# Patient Record
Sex: MALE | NOT HISPANIC OR LATINO | URBAN - METROPOLITAN AREA
[De-identification: names, ages, dates, MRNs, and addresses within clinical notes are randomized per-mention and may not be internally consistent; named-entity substitution may affect disease eponyms.]

---

## 2019-02-05 ENCOUNTER — INPATIENT HOSPITAL (OUTPATIENT)
Dept: URBAN - METROPOLITAN AREA HOSPITAL 108 | Facility: HOSPITAL | Age: 54
End: 2019-02-05
Payer: COMMERCIAL

## 2019-02-05 DIAGNOSIS — F19.10 OTHER PSYCHOACTIVE SUBSTANCE ABUSE, UNCOMPLICATED: ICD-10-CM

## 2019-02-05 DIAGNOSIS — R74.8 ABNORMAL LEVELS OF OTHER SERUM ENZYMES: ICD-10-CM

## 2019-02-05 DIAGNOSIS — F10.10 ALCOHOL ABUSE, UNCOMPLICATED: ICD-10-CM

## 2019-02-05 DIAGNOSIS — R10.84 GENERALIZED ABDOMINAL PAIN: ICD-10-CM

## 2019-02-05 PROCEDURE — 99222 1ST HOSP IP/OBS MODERATE 55: CPT | Performed by: PHYSICIAN ASSISTANT

## 2020-07-01 ENCOUNTER — TREATMENT (OUTPATIENT)
Dept: PHYSICAL THERAPY | Facility: CLINIC | Age: 55
End: 2020-07-01

## 2020-07-01 ENCOUNTER — TRANSCRIBE ORDERS (OUTPATIENT)
Dept: OCCUPATIONAL THERAPY | Facility: CLINIC | Age: 55
End: 2020-07-01

## 2020-07-01 DIAGNOSIS — S39.012S STRAIN OF LUMBAR REGION, SEQUELA: Primary | ICD-10-CM

## 2020-07-01 DIAGNOSIS — M62.830 LUMBAR PARASPINAL MUSCLE SPASM: ICD-10-CM

## 2020-07-01 PROCEDURE — 97530 THERAPEUTIC ACTIVITIES: CPT | Performed by: PHYSICAL THERAPIST

## 2020-07-01 PROCEDURE — 97014 ELECTRIC STIMULATION THERAPY: CPT | Performed by: PHYSICAL THERAPIST

## 2020-07-01 PROCEDURE — 97162 PT EVAL MOD COMPLEX 30 MIN: CPT | Performed by: PHYSICAL THERAPIST

## 2020-07-01 NOTE — PROGRESS NOTES
Orthopedic / Sports / Industrial Physical Therapy  Physical Therapy Initial Evaluation and Plan of Care    Patient Name: Barrett Arriaza          :  1965  Referring Physician: Dileep Godfrey PA  Diagnosis: Strain of lumbar region, sequela [S39.012S]    Date of Evaluation: 2020  ______________________________________________________________________    Subjective Evaluation    History of Present Illness  Date of onset: 2020  Mechanism of injury: Lifting / twisting Drywall -     Pain  Location: Central Lumbar spine -   Quality: Sharp/stabbing, ache.  Alleviating factors: Side-lying  (R) ; Change position; Heat;   Exacerbated by: Sitting>standing, Rising, bending,   Progression: worsening    Diagnostic Tests  Abnormal x-ray: Results unknown this date.    Treatments  Current treatment: medication  Patient Goals  Patient/family treatment goals: Pain alleviation; morbility to allow normal ADL;s and regular job w/o restrictions.         ___________________________________________________  Objective          Postural Observations    Additional Postural Observation Details  Pt presents with FF'ed posture and guarded - Very painful Transitional movements and guarded / antalgic gait - Pt frequently changing position due to pain -    Palpation     Additional Palpation Details  Severely painful / hypersensitive L4-S1 and (B) PS region -     Active Range of Motion     Additional Active Range of Motion Details  Lumbar AROM: Minimal AROM all planes w/ severe pain -     Strength/Myotome Testing     Additional Strength Details  LE myotomes grossly intact, but unable to fully assess due to severe pain w/ movement / positioning, etc.     Tests     Additional Tests Details  Significant pain / guarding w/ attempted SLR - unsure if (+) vs pain / guarding     General Comments     Lumbar Comments  Very difficult to fully assess due to severe pain / hypersensitivity to even light touch -   Pt unable to tolerate even  the most conservative treatment including US and even gentle mobility exercises -        See Treatment Flow sheet for Exercises, Manual therapy, and modalities.   FUNCTIONAL ACTIVITIES: X 10 min  · TAPING / BRACING: K-Tape to Unload (B) LPS  · Jt protection, ADL modification; Posture and     ___________________________________________________  Assessment & Plan     Assessment  Assessment details: Lumbar strain;   Severe pain - difficult to fully assess and poor tolerance to even most conservative modalities and unable to tolerate mobility exercises, etc.   Pt did tolerate Taping of LPS well -     PROBLEMS: Severe pain; Limited ROM / mobility; Abnormal gait; Postural dysfunction; Inability to tolerate ADL's and normal job duties -   PROGNOSIS:  Fair -     GOALS:   SHORT TERM GOALS: 2 weeks:  1) HEP Initiated; 2) Pain decreased 50%:   3) AROM  increased:  4) Improved functional ability grossly;     LONG TERM GOALS: 4 weeks (or at time of DISCHARGE): 1) (I) HEP; 2) AROM WFL and pain free; 3) Strength / mobility to be able to perform all ADL's and job-related activities w/o restrictions;       Plan  Planned therapy interventions: abdominal trunk stabilization, body mechanics training, flexibility, home exercise program, manual therapy, neuromuscular re-education, postural training, soft tissue mobilization, spinal/joint mobilization, strengthening, stretching and therapeutic activities (Modalities prn; Taping / bracing prn; )  Other planned therapy interventions: ADD GENTLE  LTR, SKC, etc as pain allows -   Frequency: 3x week  Duration in weeks: 4  Treatment plan discussed with: patient      ___________________________________________________  Manual Therapy:         mins  67925;   Therapeutic Exercise:         mins  53434;     Neuromuscular Chuckie:        mins  95785;   Therapeutic Activity:     10     mins  79055;     Ultrasound:          mins  97855;    Electrical Stimulation:   20     mins  24171 (ADRIAN  );  Dry Needling          mins self-pay   Gait Training:          mins  31095;  EVAL TIME:   20 min    Timed Treatment:   10   mins                Total Treatment:     55   mins    PT SIGNATURE:   Alfredo Christensen, PT  DATE TREATMENT INITIATED: 7/1/2020  ___________________________________________________  Initial Certification  Certification Period: 9/29/2020  I certify that the therapy services are furnished while this patient is under my care.  The services outlined above are required by this patient, and will be reviewed every 90 days.     PHYSICIAN: ________________________________  DATE: ______  Dileep Godfrey PA        Please sign and return via fax to [phone].. Thank you, [facility].  ______________________________________________________________________  [address] [place] [address]  Phone: [phone] Fax: [phone]

## 2020-07-06 ENCOUNTER — TREATMENT (OUTPATIENT)
Dept: PHYSICAL THERAPY | Facility: CLINIC | Age: 55
End: 2020-07-06

## 2020-07-06 DIAGNOSIS — M62.830 LUMBAR PARASPINAL MUSCLE SPASM: ICD-10-CM

## 2020-07-06 DIAGNOSIS — S39.012S STRAIN OF LUMBAR REGION, SEQUELA: Primary | ICD-10-CM

## 2020-07-06 PROCEDURE — 97140 MANUAL THERAPY 1/> REGIONS: CPT | Performed by: PHYSICAL THERAPIST

## 2020-07-06 PROCEDURE — 97035 APP MDLTY 1+ULTRASOUND EA 15: CPT | Performed by: PHYSICAL THERAPIST

## 2020-07-06 PROCEDURE — 97110 THERAPEUTIC EXERCISES: CPT | Performed by: PHYSICAL THERAPIST

## 2020-07-06 PROCEDURE — 97014 ELECTRIC STIMULATION THERAPY: CPT | Performed by: PHYSICAL THERAPIST

## 2020-07-06 NOTE — PROGRESS NOTES
Physical Therapy Daily Progress Note        VISIT#: 2      Barrett Arriaza reports: Pt reports he can turn more now and in that regards he is doing better. He returned to work today on light duty and it was not good. He had to push and pull items and he could just not doing it. He also had to drive to [place] for his job and all the siting was painful. He took his kinesiotape off last night. He can't go up steps.   Current Pain Level:    8/10; Worst:   9/10; Bestb:  0/10 (on his side in fetal position)  Location Of Pain: From approx T12 to sacrum,   Response to Previous Session: Reported the last session helped a lot - he can move more now  Functional Deficits: Bending, Lifting, Twisting, stairs, pushing and pulling  Progression: good  Compliance with HEP Reported: No. Reported he has none    Objective   Presents: Decreased maria l with gait. Mild forward flexed posture  Increased sets/reps of:  none   Increased resistance on:  none  Added to Program: LTR, pelvic tilts, SKTC, Quadratus stretch    Gave Written HEP:  Access Code: [email or web address]   URL: [email or web address]   Date: 07/06/2020   Prepared by: Ashlie Theodore     Exercises   Supine Posterior Pelvic Tilt - 10 reps - 3 sets - 1x daily - 5x weekly   Supine Lower Trunk Rotation - 10 reps - 3 sets - 1x daily - 5x weekly   Hooklying Clamshell with Resistance - 10 reps - 3 sets - 1x daily - 5x weekly           See Exercise, Manual, and Modality Logs for complete treatment.     Patient Education: Pt was educated on exercise biomechanical correctness, intensity, and speed. Pt  Given some simple pain science education. Educated on importance of mobility, changing positions often, limiting ROM to mostly pain free ranges.     Assessment:  Pt continues to remain guarded with slow and cautious movement but improved since last session. He was able to tolerate tactile cues and STM this date - also ultrasound. He was jumpy at times. Pt also gives  subjective report of improved mobility since last session. Pt will continue to benefit from skilled PT to address current functional deficits and impairments.       Plan: Progress to/Continue with current program. Hooklying marching and adduction w/ball. Teach diaphragmatic breathing and TA draw.         Manual Therapy:    10     mins  90289;  Therapeutic Exercise:    35     mins  49691;     Neuromuscular Chuckie:    0    mins  96581;    Therapeutic Activity:     0     mins  11262;     Electrical Stimulation: __15____ mins 36540  Ultrasound:   10 mins 51531  Timed Treatment:   70   mins   Total Treatment:     75   mins    Ashlie Theodore PTA  [place] License # [id number]  Physical Therapist Assistant

## 2020-07-07 ENCOUNTER — TREATMENT (OUTPATIENT)
Dept: PHYSICAL THERAPY | Facility: CLINIC | Age: 55
End: 2020-07-07

## 2020-07-07 DIAGNOSIS — S39.012S STRAIN OF LUMBAR REGION, SEQUELA: Primary | ICD-10-CM

## 2020-07-07 DIAGNOSIS — M62.830 LUMBAR PARASPINAL MUSCLE SPASM: ICD-10-CM

## 2020-07-07 PROCEDURE — 97140 MANUAL THERAPY 1/> REGIONS: CPT | Performed by: PHYSICAL THERAPIST

## 2020-07-07 PROCEDURE — 97014 ELECTRIC STIMULATION THERAPY: CPT | Performed by: PHYSICAL THERAPIST

## 2020-07-07 PROCEDURE — 97110 THERAPEUTIC EXERCISES: CPT | Performed by: PHYSICAL THERAPIST

## 2020-07-07 PROCEDURE — 97530 THERAPEUTIC ACTIVITIES: CPT | Performed by: PHYSICAL THERAPIST

## 2020-07-07 NOTE — PROGRESS NOTES
------------------------------------------------------------------------------------------------------   MD PROGRESS NOTE    Patient: Barrett Arriaza        : 1965  Diagnosis/ICD-10 Code:  Strain of lumbar region, sequela [S39.012S]  Referring practitioner: Dileep Godfrey PA  Date of Initial Visit: 2020                  Today's Date: 2020  _________________________________________________________________    Thank you for the referral of Mr. Arriaza to [facility].  Mr. Arriaza has attended 3 PT sessions and their treatment has consisted of: modalities prn, manual therapy, therapeutic exercise, kinesio taping, patient education, and HEP.     Subjective   Barrett Arriaza reports: feeling a little bit better since starting PT - Able to move a little better, but it still hurts -   Pain (B) LSS spine feels like pressure thru spine when standing - Feels best fetal position R/L;  Pain increased with prolonged standing, walking < sitting; Rising;  Bending; Driving; sweeping -   ___________________________________________________________________  Objective              OBSERVATION: Guarded posture, flexed posture w/ guarded antalgic gait - Level pelvis;  MM LPS;             Swelling L4-S1 central;  Painful transitional movements -               PALPATION: Severely tenderness (B) L4-S1 and associated PS         AROM: Significantly limited AROM lumbar spine into flexion, extension, SB (B)   STRENGTH: LE Myotomes grossly intact   DTR's/SENSATION: Grossly intact (B) LE's -    SPECIAL TESTS: Pain with SLR (B), but may be due to guarding vs actual Positive Test;    ACTIVITY TOLERANCE: Improved tolerance to light ADL's, but unable to tolerate mod/heavy ADL's and his very physical job -      See Exercise, Manual, and Modality Logs for complete treatment.      Functional / Therapeutic Activities:  X 15 min  · TAPING / BRACING: K-tape to inhibit LPS (B) for pain control (Pt did  not return after OV for taping)  · FUNCTIONAL ASSESSMENT   · Jt protection, ADL modification; Posture and    ___________________________________________________________________   Assessment/Plan  Lumbar strain;   Improved overall, but still signficant pain and employer NOT complying w/ restrictions -   Only minimal improvement - Continued severe pain and limited mobility and function -  Mr. Arriaza would benefit from continued Physical Therapy.     P: Recommend continued Physical Therapy to allow a full and safe return to ADL's and normal job duties.    Please advise after your exam.    Thank you again for this referral of Mr. Arriaza to [facility].    PT Signature: ______________________________   Alfredo Christensen, PT  ____________________________________________________________________  Manual Therapy:    10     mins  06762;    Therapeutic Exercise:   10     mins  23965;     Neuromuscular Chuckie:        mins  01882;     Therapeutic Activity:    15     mins  42704;     Ultrasound:     08     mins  32542;     Electrical Stimulation:  15     mins  32522 ( );  Dry Needling          mins self-pay     Gait Training:          mins  03272;    Timed Treatment:   48   mins             Total Treatment:     70   mins    ______________________________________________________________________  [address] [place] [address]  Phone: [phone] Fax: [phone]

## 2020-07-13 ENCOUNTER — TREATMENT (OUTPATIENT)
Dept: PHYSICAL THERAPY | Facility: CLINIC | Age: 55
End: 2020-07-13

## 2020-07-13 DIAGNOSIS — S39.012S STRAIN OF LUMBAR REGION, SEQUELA: Primary | ICD-10-CM

## 2020-07-13 DIAGNOSIS — M62.830 LUMBAR PARASPINAL MUSCLE SPASM: ICD-10-CM

## 2020-07-13 PROCEDURE — 97112 NEUROMUSCULAR REEDUCATION: CPT | Performed by: PHYSICAL THERAPIST

## 2020-07-13 PROCEDURE — 97110 THERAPEUTIC EXERCISES: CPT | Performed by: PHYSICAL THERAPIST

## 2020-07-13 PROCEDURE — 97140 MANUAL THERAPY 1/> REGIONS: CPT | Performed by: PHYSICAL THERAPIST

## 2020-07-13 PROCEDURE — 97014 ELECTRIC STIMULATION THERAPY: CPT | Performed by: PHYSICAL THERAPIST

## 2020-07-13 PROCEDURE — 97035 APP MDLTY 1+ULTRASOUND EA 15: CPT | Performed by: PHYSICAL THERAPIST

## 2020-07-13 NOTE — PROGRESS NOTES
Physical Therapy Daily Progress Note        VISIT#: 4      Barrett Arriaza reports: Pt reports he is still feeling a lot of pressure in his low back - especially with prolonged walking and sitting. He does report moving better and is able to lie on his back now not just fetal position.   Current Pain Level:    0/10; Worst:   8/10; Best:  0/10 stated pain has started to feel burning.  Location Of Pain: low back  Response to Previous Session: Progressing  Functional Deficits: prolonged walking and sitting. Pushing and pulling  Progression: progressing   Compliance with HEP Reported: Yes    Objective   Presents: Normal gait pattern.   Increased sets/reps of:  Pelvic Tilts   Increased resistance on:  none  Added to Program: BKFOs, Seated L/S mobilization   Home Exercises Given: BKFOs      See Exercise, Manual, and Modality Logs for complete treatment.     Patient Education: Pt was educated on exercise biomechanical correctness, intensity, and speed.     Assessment:  Mild edema noted in L side of lumbar spine as compared to the R side. Tender to palpitation/moderate pressure and jumpy if pressure gets too heavy.  Hip mobility on L is more restricted than on his R side. Applied Kinesiotape to L/S for pain and edema mobilization. Will assess response next session. Pt will continue to benefit from skilled PT to address current functional deficits and impairments.       Plan: Progress to/Continue with current program. Return to evaluating therapist. Assess affect of kinesiotape on L/S for swelling and pain. Assess response to today's session.         Manual Therapy:    12     mins  59258;  Therapeutic Exercise:    25    mins  13695;     Neuromuscular [place]:    8    mins  43331;    Therapeutic Activity:     0     mins  38154;     Electrical Stimulation: __15____ mins 12823  Ultrasound:   10 mins 94128  Timed Treatment:   65   mins   Total Treatment:     70   mins    CELINA Samaniego License # [id number]  Physical  Therapist Assistant

## 2020-07-14 ENCOUNTER — TREATMENT (OUTPATIENT)
Dept: PHYSICAL THERAPY | Facility: CLINIC | Age: 55
End: 2020-07-14

## 2020-07-14 DIAGNOSIS — M62.830 LUMBAR PARASPINAL MUSCLE SPASM: ICD-10-CM

## 2020-07-14 DIAGNOSIS — S39.012S STRAIN OF LUMBAR REGION, SEQUELA: Primary | ICD-10-CM

## 2020-07-14 PROCEDURE — 97530 THERAPEUTIC ACTIVITIES: CPT | Performed by: PHYSICAL THERAPIST

## 2020-07-14 PROCEDURE — 97140 MANUAL THERAPY 1/> REGIONS: CPT | Performed by: PHYSICAL THERAPIST

## 2020-07-14 PROCEDURE — 97110 THERAPEUTIC EXERCISES: CPT | Performed by: PHYSICAL THERAPIST

## 2020-07-14 NOTE — PROGRESS NOTES
------------------------------------------------------------------------------------------------------   MD PROGRESS NOTE     Patient: Barrett Arriaza        : 1965  Diagnosis/ICD-10 Code:  Strain of lumbar region, sequela [S39.012S]  Referring practitioner: Dileep Godfrey PA  Date of Initial Visit: 2020                  Today's Date: 2020  _________________________________________________________________     Thank you for the referral of Mr. Arriaza to [facility].  Mr. Arriaza has attended 5 PT sessions and their treatment has consisted of: modalities prn, manual therapy, therapeutic exercise, kinesio taping, patient education, and HEP.      Subjective   Barrett Arriaza reports: improvement with improved ability to move around - pain with lifting items of wt - prolonged standing and prolonged sitting, pain with bending,  - Pain more central and to the left -   Pain up to about 7-8/10 at times  No longer able to side-ly due to pain - now more comfortable on back   ___________________________________________________________________  Objective              OBSERVATION: Guarded posture, flexed posture w/ guarded antalgic gait - (L) [place] / ASIS / PSIS higher vs (R);             Swelling L4-S1 central;  Painful transitional movements -               PALPATION: Severely tenderness (B) L4-S1 and associated PS  : Tender (B) SI Region (R)              AROM: Significantly limited AROM lumbar spine into flexion (Slightly improved) , extension (minimal w/ severe pain) , SB (B) 1/2 norm with increased LBP                STRENGTH: LE Myotomes grossly intact              DTR's/SENSATION: Grossly intact (B) LE's -               SPECIAL TESTS: Pain with SLR (B), but may be due to guarding vs actual Positive Test;                                            (+) SI Jt Dysfunction / Upshear (L) >(R)              ACTIVITY TOLERANCE: Improved tolerance to light ADL's, but unable to  tolerate mod/heavy ADL's including, standing, sitting, lifting, pushing / pulling and his very physical job -               See Exercise, Manual, and Modality Logs for complete treatment.      Functional / Therapeutic Activities:  X 25 min  · TAPING / BRACING: K-tape to inhibit LPS (B) for pain control  2 strips distal/prox with 2 unloading strips SI region and L4-5 ;  K-Tape to Inhibit QL - (B)   · FUNCTIONAL ASSESSMENT   · Jt protection, ADL modification; Posture and    ___________________________________________________________________   Assessment/Plan  Lumbar strain;   Improved overall, but still signficant pain and employer NOT complying w/ restrictions -   Only minimal improvement - Continued severe pain and limited mobility and function -  Improved mobility after more aggressive MT, but pain persistent   Mr. Arriaza would benefit from continued Physical Therapy vs further assessment .      P: Recommend continued Physical Therapy to allow a full and safe return to ADL's and normal job duties.    Please advise after your exam.     Thank you again for this referral of Mr. Arriaza to [facility].     PT Signature: ______________________________   Alfredo Christensen, PT  ____________________________________________________________________  Manual Therapy:            15     mins  83796;    Therapeutic Exercise:   15     mins  48391;     Neuromuscular Chuckie:        mins  27476;     Therapeutic Activity:    25     mins  81647;     Ultrasound:                     08     mins  97144;     Electrical Stimulation:  15     mins  11020 ( );  Dry Needling                       mins self-pay     Gait Training:            mins  76394;     Timed Treatment:   63   mins             Total Treatment:     80   mins     ______________________________________________________________________  [address] [address] [place] [address]  Phone: [phone]                 Fax: [phone]

## 2020-07-15 ENCOUNTER — TELEPHONE (OUTPATIENT)
Dept: PHYSICAL THERAPY | Facility: CLINIC | Age: 55
End: 2020-07-15

## 2020-07-15 NOTE — TELEPHONE ENCOUNTER
MR DICKERSON CALLED STATING HIS BACK IS HURTING HIM REAL BAD AND HE WANTS TO KNOW WHAT HE CAN DO.  HE WOULD LIKE SOMEONE TO CALL HIM.

## 2020-11-25 ENCOUNTER — DOCUMENTATION (OUTPATIENT)
Dept: PHYSICAL THERAPY | Facility: CLINIC | Age: 55
End: 2020-11-25

## 2020-11-25 NOTE — PROGRESS NOTES
Discharge Summary  Discharge Summary from Physical Therapy Report    Patient Information  Barrett Arriaza  1965    Dates  PT visit: 07/01/20 to 07/14/20  Number of Visits: 6       Discharge Status of Patient: See PT Note dated 07/14/20    Goals: Not Met    Visit Diagnoses:  No diagnosis found.    Discharge Plan: Pt did not return to PT after follow up with referring provider    Comments: none    Date of Discharge 11/25/20        Ashlie Theodore, CELINA  Physical Therapist

## 2022-02-15 ENCOUNTER — HOSPITAL ENCOUNTER (EMERGENCY)
Facility: HOSPITAL | Age: 57
Discharge: HOME | End: 2022-02-15
Attending: EMERGENCY MEDICINE | Admitting: EMERGENCY MEDICINE

## 2022-02-15 ENCOUNTER — APPOINTMENT (OUTPATIENT)
Dept: CT IMAGING | Facility: HOSPITAL | Age: 57
End: 2022-02-15

## 2022-02-15 VITALS
SYSTOLIC BLOOD PRESSURE: 118 MMHG | TEMPERATURE: 98.2 F | DIASTOLIC BLOOD PRESSURE: 77 MMHG | HEART RATE: 76 BPM | OXYGEN SATURATION: 98 % | RESPIRATION RATE: 18 BRPM

## 2022-02-15 DIAGNOSIS — K62.5 RECTAL BLEEDING: Primary | ICD-10-CM

## 2022-02-15 LAB
ABO GROUP BLD: NORMAL
ALBUMIN SERPL-MCNC: 4.2 G/DL (ref 3.5–5.2)
ALBUMIN/GLOB SERPL: 1.6 G/DL
ALP SERPL-CCNC: 79 U/L (ref 39–117)
ALT SERPL W P-5'-P-CCNC: 28 U/L (ref 1–41)
ANION GAP SERPL CALCULATED.3IONS-SCNC: 7.9 MMOL/L (ref 5–15)
AST SERPL-CCNC: 24 U/L (ref 1–40)
BASOPHILS # BLD AUTO: 0.04 10*3/MM3 (ref 0–0.2)
BASOPHILS NFR BLD AUTO: 0.9 % (ref 0–1.5)
BILIRUB SERPL-MCNC: 0.6 MG/DL (ref 0–1.2)
BILIRUB UR QL STRIP: NEGATIVE
BLD GP AB SCN SERPL QL: NEGATIVE
BUN SERPL-MCNC: 8 MG/DL (ref 6–20)
BUN/CREAT SERPL: 10.1 (ref 7–25)
CALCIUM SPEC-SCNC: 9.2 MG/DL (ref 8.6–10.5)
CHLORIDE SERPL-SCNC: 101 MMOL/L (ref 98–107)
CLARITY UR: CLEAR
CO2 SERPL-SCNC: 30.1 MMOL/L (ref 22–29)
COLOR UR: YELLOW
CREAT SERPL-MCNC: 0.79 MG/DL (ref 0.76–1.27)
DEPRECATED RDW RBC AUTO: 42.8 FL (ref 37–54)
EOSINOPHIL # BLD AUTO: 0.3 10*3/MM3 (ref 0–0.4)
EOSINOPHIL NFR BLD AUTO: 6.8 % (ref 0.3–6.2)
ERYTHROCYTE [DISTWIDTH] IN BLOOD BY AUTOMATED COUNT: 12.8 % (ref 12.3–15.4)
GFR SERPL CREATININE-BSD FRML MDRD: 124 ML/MIN/1.73
GLOBULIN UR ELPH-MCNC: 2.6 GM/DL
GLUCOSE SERPL-MCNC: 86 MG/DL (ref 65–99)
GLUCOSE UR STRIP-MCNC: NEGATIVE MG/DL
HCT VFR BLD AUTO: 45.6 % (ref 37.5–51)
HGB BLD-MCNC: 16.3 G/DL (ref 13–17.7)
HGB UR QL STRIP.AUTO: NEGATIVE
IMM GRANULOCYTES # BLD AUTO: 0.01 10*3/MM3 (ref 0–0.05)
IMM GRANULOCYTES NFR BLD AUTO: 0.2 % (ref 0–0.5)
KETONES UR QL STRIP: NEGATIVE
LEUKOCYTE ESTERASE UR QL STRIP.AUTO: NEGATIVE
LYMPHOCYTES # BLD AUTO: 1.83 10*3/MM3 (ref 0.7–3.1)
LYMPHOCYTES NFR BLD AUTO: 41.2 % (ref 19.6–45.3)
MAGNESIUM SERPL-MCNC: 2.1 MG/DL (ref 1.6–2.6)
MCH RBC QN AUTO: 32.9 PG (ref 26.6–33)
MCHC RBC AUTO-ENTMCNC: 35.7 G/DL (ref 31.5–35.7)
MCV RBC AUTO: 92.1 FL (ref 79–97)
MONOCYTES # BLD AUTO: 0.76 10*3/MM3 (ref 0.1–0.9)
MONOCYTES NFR BLD AUTO: 17.1 % (ref 5–12)
NEUTROPHILS NFR BLD AUTO: 1.5 10*3/MM3 (ref 1.7–7)
NEUTROPHILS NFR BLD AUTO: 33.8 % (ref 42.7–76)
NITRITE UR QL STRIP: NEGATIVE
NRBC BLD AUTO-RTO: 0 /100 WBC (ref 0–0.2)
PH UR STRIP.AUTO: 7.5 [PH] (ref 5–8)
PLATELET # BLD AUTO: 251 10*3/MM3 (ref 140–450)
PMV BLD AUTO: 8.8 FL (ref 6–12)
POTASSIUM SERPL-SCNC: 3.6 MMOL/L (ref 3.5–5.2)
PROT SERPL-MCNC: 6.8 G/DL (ref 6–8.5)
PROT UR QL STRIP: NEGATIVE
RBC # BLD AUTO: 4.95 10*6/MM3 (ref 4.14–5.8)
RH BLD: POSITIVE
SODIUM SERPL-SCNC: 139 MMOL/L (ref 136–145)
SP GR UR STRIP: >=1.03 (ref 1–1.03)
T&S EXPIRATION DATE: NORMAL
UROBILINOGEN UR QL STRIP: NORMAL
WBC NRBC COR # BLD: 4.44 10*3/MM3 (ref 3.4–10.8)

## 2022-02-15 PROCEDURE — 99283 EMERGENCY DEPT VISIT LOW MDM: CPT

## 2022-02-15 PROCEDURE — 96374 THER/PROPH/DIAG INJ IV PUSH: CPT

## 2022-02-15 PROCEDURE — 80053 COMPREHEN METABOLIC PANEL: CPT | Performed by: EMERGENCY MEDICINE

## 2022-02-15 PROCEDURE — 25010000002 DIPHENHYDRAMINE PER 50 MG: Performed by: EMERGENCY MEDICINE

## 2022-02-15 PROCEDURE — 86900 BLOOD TYPING SEROLOGIC ABO: CPT | Performed by: EMERGENCY MEDICINE

## 2022-02-15 PROCEDURE — 74177 CT ABD & PELVIS W/CONTRAST: CPT

## 2022-02-15 PROCEDURE — 85025 COMPLETE CBC W/AUTO DIFF WBC: CPT | Performed by: EMERGENCY MEDICINE

## 2022-02-15 PROCEDURE — 25010000002 KETOROLAC TROMETHAMINE PER 15 MG: Performed by: EMERGENCY MEDICINE

## 2022-02-15 PROCEDURE — 86850 RBC ANTIBODY SCREEN: CPT | Performed by: EMERGENCY MEDICINE

## 2022-02-15 PROCEDURE — 81003 URINALYSIS AUTO W/O SCOPE: CPT | Performed by: EMERGENCY MEDICINE

## 2022-02-15 PROCEDURE — 96375 TX/PRO/DX INJ NEW DRUG ADDON: CPT

## 2022-02-15 PROCEDURE — 86901 BLOOD TYPING SEROLOGIC RH(D): CPT | Performed by: EMERGENCY MEDICINE

## 2022-02-15 PROCEDURE — 83735 ASSAY OF MAGNESIUM: CPT | Performed by: EMERGENCY MEDICINE

## 2022-02-15 PROCEDURE — 25010000002 IOPAMIDOL 61 % SOLUTION: Performed by: EMERGENCY MEDICINE

## 2022-02-15 RX ORDER — KETOROLAC TROMETHAMINE 15 MG/ML
15 INJECTION, SOLUTION INTRAMUSCULAR; INTRAVENOUS ONCE
Status: COMPLETED | OUTPATIENT
Start: 2022-02-15 | End: 2022-02-15

## 2022-02-15 RX ORDER — DIPHENHYDRAMINE HYDROCHLORIDE 50 MG/ML
12.5 INJECTION INTRAMUSCULAR; INTRAVENOUS ONCE
Status: COMPLETED | OUTPATIENT
Start: 2022-02-15 | End: 2022-02-15

## 2022-02-15 RX ADMIN — IOPAMIDOL 85 ML: 612 INJECTION, SOLUTION INTRAVENOUS at 15:19

## 2022-02-15 RX ADMIN — KETOROLAC TROMETHAMINE 15 MG: 15 INJECTION, SOLUTION INTRAMUSCULAR; INTRAVENOUS at 16:07

## 2022-02-15 RX ADMIN — DIPHENHYDRAMINE HYDROCHLORIDE 12.5 MG: 50 INJECTION, SOLUTION INTRAMUSCULAR; INTRAVENOUS at 15:10

## 2022-02-15 NOTE — ED PROVIDER NOTES
EMERGENCY DEPARTMENT ENCOUNTER    Room Number:  38/38  Date of encounter:  2/16/2022  PCP: Mahi Omer APRN  Historian: Patient      HPI:  Chief Complaint: Abdominal pain, rectal bleeding  A complete HPI/ROS/PMH/PSH/SH/FH are unobtainable due to: None    Context: Barrett Arriaza is a 56 y.o. male who presents to the ED via private vehicle for evaluation for several weeks of intermittent lower abdominal pains with bright red blood per rectum with bowel movements.  Patient reports history of hemorrhoids but does not feel like he has any active hemorrhoids that he can tell.  Is also reported some postprandial fatigue and weakness and sweating.  Reports some night sweats and mild 5 to 6 pound unintentional weight loss.  Reports no fevers, chills, cough, chest pain, short of breath.  Has had nausea without vomiting.      MEDICAL RECORD REVIEW    Chart review in epic shows that the patient was in the ER at [facility] earlier today and left without being seen, did have some blood drawn, which has resulted with CMP of sodium 135, potassium 4.1, anion gap of 6, glucose of 85, BUN of 7, creatinine of 0.7, unremarkable LFTs, CBC showing white blood cell count of 5.07, hemoglobin 16.9, platelets of 291    PAST MEDICAL HISTORY  Active Ambulatory Problems     Diagnosis Date Noted   • No Active Ambulatory Problems     Resolved Ambulatory Problems     Diagnosis Date Noted   • No Resolved Ambulatory Problems     No Additional Past Medical History         PAST SURGICAL HISTORY  No past surgical history on file.      FAMILY HISTORY  No family history on file.      SOCIAL HISTORY  Social History     Socioeconomic History   • Marital status:    Tobacco Use   • Smoking status: Heavy Tobacco Smoker     Packs/day: 1.00     Types: Cigarettes   Substance and Sexual Activity   • Alcohol use: Yes     Alcohol/week: 3.0 standard drinks     Types: 3 Cans of beer per week   • Drug use: Yes     Frequency: 3.0 times per week     Types:  Marijuana         ALLERGIES  Iodinated diagnostic agents, Oxycodone-acetaminophen, Morphine, and Penicillins        REVIEW OF SYSTEMS  Review of Systems     All systems reviewed and negative except for those discussed in HPI.       PHYSICAL EXAM    I have reviewed the triage vital signs and nursing notes.    ED Triage Vitals [02/15/22 1343]   Temp Heart Rate Resp BP SpO2   98.2 °F (36.8 °C) 102 18 -- 94 %      Temp src Heart Rate Source Patient Position BP Location FiO2 (%)   Tympanic Monitor -- -- --       Physical Exam  General: No acute distress, nontoxic  HEENT: Mucous membranes moist, atraumatic, EOMI  Neck: Full ROM  Pulm: Symmetric chest rise, nonlabored, lungs CTAB  Cardiovascular: Regular rate and rhythm, intact distal pulses  GI: Soft, minimal generalized lower abdominal tenderness palpation, nondistended, no rebound, no guarding, bowel sounds present  Rectal: No visible external hemorrhoids or anal fissures  MSK: Full ROM, no deformity  Skin: Warm, dry  Neuro: Awake, alert, oriented x 4, GCS 15, moving all extremities, no focal deficits  Psych: Calm, cooperative      N95, protective eye goggles, and gloves used during this encounter. Patient in surgical mask.      LAB RESULTS  No results found for this or any previous visit (from the past 24 hour(s)).    Ordered the above labs and independently reviewed the results.        RADIOLOGY  No Radiology Exams Resulted Within Past 24 Hours    I ordered the above noted radiological studies. Reviewed by me.  See dictation for official radiology interpretation.      PROCEDURES    Procedures      MEDICATIONS GIVEN IN ER    Medications   diphenhydrAMINE (BENADRYL) injection 12.5 mg (12.5 mg Intravenous Given 2/15/22 1510)   iopamidol (ISOVUE-300) 61 % injection 100 mL (85 mL Intravenous Given by Other 2/15/22 1519)   ketorolac (TORADOL) injection 15 mg (15 mg Intravenous Given 2/15/22 1607)         PROGRESS, DATA ANALYSIS, CONSULTS, AND MEDICAL DECISION MAKING    All  labs have been independently reviewed by me.  All radiology studies have been reviewed by me and discussed with radiologist dictating the report.   EKG's independently viewed and interpreted by me.  Discussion below represents my analysis of pertinent findings related to patient's condition, differential diagnosis, treatment plan and final disposition.    Initial concern for colitis, diverticulitis, internal hemorrhoid, bowel obstruction, anemia, renal failure, electrolyte normalities, among others.    ED Course as of 02/16/22 2032   Tue Feb 15, 2022   1459 WBC: 4.44 [DC]   1459 Hemoglobin: 16.3 [DC]   1518 Glucose: 86 [DC]   1518 BUN: 8 [DC]   1518 Creatinine: 0.79 [DC]   1518 Sodium: 139 [DC]   1518 Potassium: 3.6 [DC]   1519 ALT (SGPT): 28 [DC]   1519 AST (SGOT): 24 [DC]   1519 Alkaline Phosphatase: 79 [DC]   1519 Total Bilirubin: 0.6 [DC]      ED Course User Index  [DC] Fabiano Lynch MD     CT report showing enlarged and possibly boggy prostate, patient denies any urinary symptoms but some of the more systemic type symptoms he is experiencing could be related to prostatitis.  Will check a urinalysis and if evidence of urinary tract infection we will put him on extended course of Bactrim.  If negative, recommend GI follow-up and PCP follow-up.  No evidence of any acute emergent issues otherwise.    AS OF 20:32 EST VITALS:    BP - 118/77  HR - 76  TEMP - 98.2 °F (36.8 °C) (Tympanic)  02 SATS - 98%        DIAGNOSIS  Final diagnoses:   Rectal bleeding         DISPOSITION  DISCHARGE    Patient discharged in stable condition.    Reviewed implications of results, diagnosis, meds, responsibility to follow up, warning signs and symptoms of possible worsening, potential complications and reasons to return to ER.    Patient/Family voiced understanding of above instructions.    Discussed plan for discharge, as there is no emergent indication for admission. Patient referred to primary care provider for BP management due to  today's BP. Pt/family is agreeable and understands need for follow up and repeat testing.  Pt is aware that discharge does not mean that nothing is wrong but it indicates no emergency is present that requires admission and they must continue care with follow-up as given below or physician of their choice.     FOLLOW-UP  [facility] Emergency Department  [address] [address] [phone]    As needed, If symptoms worsen    Mahi Omer, REJI  [address] [address]  [phone]    Schedule an appointment as soon as possible for a visit   As needed    [facility]  [address]  [facility] [address] [phone]  Schedule an appointment as soon as possible for a visit            Medication List      No changes were made to your prescriptions during this visit.                    Fabiano Lynch MD  02/16/22 2032

## 2022-02-15 NOTE — ED TRIAGE NOTES
"Pt states that for the last few weeks he has had bright red blood in his stool. Also complains of weakness. States \"Any time I eat I start sweating a lot.\" Pt complains of lower abdominal pain as well. Went to [facility] this morning and [facility]BS. Patient masked at arrival and triage staff wore all appropriate PPE during entire encounter with patient.     "

## 2022-02-15 NOTE — DISCHARGE INSTRUCTIONS
Follow-up with gastroenterology as discussed for colonoscopy in the near future, stay well-hydrated with plenty of water, close PCP follow-up, ED return for worsening symptoms as needed.

## 2022-02-24 ENCOUNTER — TELEPHONE (OUTPATIENT)
Dept: GASTROENTEROLOGY | Facility: CLINIC | Age: 57
End: 2022-02-24

## 2022-02-24 ENCOUNTER — OFFICE VISIT (OUTPATIENT)
Dept: GASTROENTEROLOGY | Facility: CLINIC | Age: 57
End: 2022-02-24

## 2022-02-24 VITALS — HEIGHT: 68 IN | WEIGHT: 149.8 LBS | TEMPERATURE: 97.1 F | BODY MASS INDEX: 22.7 KG/M2

## 2022-02-24 DIAGNOSIS — R63.4 WEIGHT LOSS: ICD-10-CM

## 2022-02-24 DIAGNOSIS — R10.84 GENERALIZED ABDOMINAL PAIN: Primary | ICD-10-CM

## 2022-02-24 DIAGNOSIS — R11.0 NAUSEA: ICD-10-CM

## 2022-02-24 DIAGNOSIS — Z80.0 FAMILY HISTORY OF COLON CANCER IN FATHER: ICD-10-CM

## 2022-02-24 PROCEDURE — 99204 OFFICE O/P NEW MOD 45 MIN: CPT | Performed by: PHYSICIAN ASSISTANT

## 2022-02-24 RX ORDER — DICYCLOMINE HYDROCHLORIDE 10 MG/1
10 CAPSULE ORAL
Qty: 60 CAPSULE | Refills: 1 | OUTPATIENT
Start: 2022-02-24 | End: 2022-03-26

## 2022-02-24 RX ORDER — QUETIAPINE 400 MG/1
400 TABLET, FILM COATED, EXTENDED RELEASE ORAL NIGHTLY
COMMUNITY

## 2022-02-24 NOTE — TELEPHONE ENCOUNTER
----- Message from Ladan Hahn sent at 2/24/2022 12:41 PM EST -----  Regarding: dicyclomine (Bentyl) 10 MG capsule  Contact: [phone]  Pt is calling stating that bebeto was supposed to send a prescription over to pharmacy.                   [id number] - [place], [place] - [address] AT [facility] - [phone]  - [phone] FX [address] [facility] [address]  Phone: [phone] Fax: [phone]  Hours: Not open 24 hours

## 2022-02-24 NOTE — TELEPHONE ENCOUNTER
SW Yue for colonoscopy on 04/19/2022   arrive at 7am   . Mailed Prep instructions to Mailing address on-file. ----[facility]      Advised PT  that  will call with final arrival time  24 hrs before procedure. If they do not get a phone call, arrival time will stay the same as given on instructions

## 2022-02-24 NOTE — TELEPHONE ENCOUNTER
Called pt [facility] and spoke with Skylar who advised that they did receive the script and have it ready.      Called pt and advised of the above.  Verb understanding.

## 2022-03-26 ENCOUNTER — HOSPITAL ENCOUNTER (EMERGENCY)
Facility: HOSPITAL | Age: 57
Discharge: HOME | End: 2022-03-26
Attending: EMERGENCY MEDICINE | Admitting: EMERGENCY MEDICINE

## 2022-03-26 VITALS
TEMPERATURE: 97.8 F | DIASTOLIC BLOOD PRESSURE: 98 MMHG | SYSTOLIC BLOOD PRESSURE: 117 MMHG | OXYGEN SATURATION: 94 % | HEART RATE: 100 BPM | RESPIRATION RATE: 18 BRPM

## 2022-03-26 DIAGNOSIS — M25.531 RIGHT WRIST PAIN: ICD-10-CM

## 2022-03-26 DIAGNOSIS — G56.01 RIGHT CARPAL TUNNEL SYNDROME: Primary | ICD-10-CM

## 2022-03-26 PROCEDURE — 99283 EMERGENCY DEPT VISIT LOW MDM: CPT

## 2022-03-26 NOTE — ED NOTES
Pt arrives via [facility] with c/o R wrist/hand pain. Pt states he has carpel tunnel and needs a brace. Pt a&ox4, abc's intact, NAD noted, ambulatory to triage.    Patient wearing mask during triage. RN wearing appropriate PPE during triage. Hand hygiene performed.

## 2022-03-26 NOTE — ED PROVIDER NOTES
EMERGENCY DEPARTMENT ENCOUNTER    Room Number:  05/05  Date of encounter:  3/26/2022  PCP: Mahi Omer APRN  Historian: Patient, spouse      I used full protective equipment while examining this patient.  This includes face mask, gloves and protective eyewear.  I washed my hands before entering the room and immediately upon leaving the room      HPI:  Chief Complaint: Right wrist pain  A complete HPI/ROS/PMH/PSH/SH/FH are unobtainable due to: Nothing    Context: Barrett Arriaza is a 56 y.o. male who presents to the ED c/o acute on chronic right wrist pain.  Patient states he has a history of carpal tunnel syndrome and his right wrist has been hurting more over the past several weeks.  He states this has been due to increasing work demands.  Patient works doing plaster.  He denies any falls.  He denies any fever, redness, significant swelling.  He denies any tingling distal to the wrist.  He states he did have a splint which helped a great deal however he has lost the splint.  He states that he has obtained splints from [facility] and other places without any improvement.    Review of Medical Records  I reviewed last ER visit from 2/15/2022.  Patient seen for rectal bleeding.    PAST MEDICAL HISTORY  Active Ambulatory Problems     Diagnosis Date Noted   • Generalized abdominal pain 02/24/2022   • Weight loss 02/24/2022   • Family history of colon cancer in father 02/24/2022   • Nausea 02/24/2022     Resolved Ambulatory Problems     Diagnosis Date Noted   • No Resolved Ambulatory Problems     No Additional Past Medical History         PAST SURGICAL HISTORY  No past surgical history on file.      FAMILY HISTORY  Family History   Problem Relation Age of Onset   • Colon cancer Father          SOCIAL HISTORY  Social History     Socioeconomic History   • Marital status:    Tobacco Use   • Smoking status: Heavy Tobacco Smoker     Packs/day: 1.00     Types: Cigarettes   Substance and Sexual Activity   • Alcohol  use: Yes     Alcohol/week: 3.0 standard drinks     Types: 3 Cans of beer per week   • Drug use: Yes     Frequency: 3.0 times per week     Types: Marijuana         ALLERGIES  Iodinated diagnostic agents, Oxycodone-acetaminophen, Morphine, and Penicillins        REVIEW OF SYSTEMS  All systems reviewed and negative except for those discussed in HPI.       PHYSICAL EXAM    I have reviewed the triage vital signs and nursing notes.    ED Triage Vitals [03/26/22 0916]   Temp Heart Rate Resp BP SpO2   97.8 °F (36.6 °C) 100 18 117/98 94 %      Temp src Heart Rate Source Patient Position BP Location FiO2 (%)   Tympanic Monitor -- -- --       Physical Exam  GENERAL: Alert, oriented, not distressed  HENT: head atraumatic, no nuchal rigidity  EYES: no scleral icterus, EOMI  CV: regular rhythm, regular rate, no murmur  RESPIRATORY: normal effort, CTA  ABDOMEN: soft, nontender  MUSCULOSKELETAL: Mild tenderness to right anterior right wrist.  No erythema.  Neurovascular intact distally.  Full range of motion.  NEURO: alert, moves all extremities, follows commands  SKIN: warm, dry    Velcro cock-up splint applied to patient's right wrist by myself.  Patient states it feels much better post application the patient is neurovascularly intact.    MEDICATIONS GIVEN IN ER    Medications - No data to display      PROGRESS, DATA ANALYSIS, CONSULTS, AND MEDICAL DECISION MAKING    All labs have been independently reviewed by me.  All radiology studies have been reviewed by me and discussed with radiologist dictating the report.   EKG's independently viewed and interpreted by me.  Discussion below represents my analysis of pertinent findings related to patient's condition, differential diagnosis, treatment plan and final disposition.    I have discussed case with Dr. Hill, emergency room physician.  He has performed his own bedside examination and agrees with treatment plan.    ED Course as of 03/26/22 1027   Sat Mar 26, 2022   0938 Patient  presents with acute on chronic right wrist pain.  Patient has a history of carpal tunnel syndrome.  No new trauma.  No fevers, chills, erythema.  Patient is requesting a splint. [EE]      ED Course User Index  [EE] Steven Jaimes PA       AS OF 10:27 EDT VITALS:    BP - 117/98  HR - 100  TEMP - 97.8 °F (36.6 °C) (Tympanic)  O2 SATS - 94%        DIAGNOSIS  Final diagnoses:   Right carpal tunnel syndrome   Right wrist pain         DISPOSITION  Discharged      Dictated utilizing Dragon dictation     Steven Jaimes PA  03/26/22 1028

## 2022-04-12 ENCOUNTER — TRANSCRIBE ORDERS (OUTPATIENT)
Dept: ADMINISTRATIVE | Facility: HOSPITAL | Age: 57
End: 2022-04-12

## 2022-04-12 DIAGNOSIS — Z01.818 OTHER SPECIFIED PRE-OPERATIVE EXAMINATION: Primary | ICD-10-CM

## 2022-04-14 ENCOUNTER — TELEPHONE (OUTPATIENT)
Dept: GASTROENTEROLOGY | Facility: CLINIC | Age: 57
End: 2022-04-14

## 2022-04-14 DIAGNOSIS — Z01.818 PRE-OP TESTING: Primary | ICD-10-CM

## 2022-04-16 ENCOUNTER — LAB (OUTPATIENT)
Dept: LAB | Facility: HOSPITAL | Age: 57
End: 2022-04-16

## 2022-04-16 DIAGNOSIS — Z01.818 OTHER SPECIFIED PRE-OPERATIVE EXAMINATION: ICD-10-CM

## 2022-04-16 LAB — SARS-COV-2 ORF1AB RESP QL NAA+PROBE: NOT DETECTED

## 2022-04-16 PROCEDURE — U0004 COV-19 TEST NON-CDC HGH THRU: HCPCS

## 2022-04-16 PROCEDURE — C9803 HOPD COVID-19 SPEC COLLECT: HCPCS

## 2022-04-18 RX ORDER — CYCLOBENZAPRINE HCL 10 MG
10 TABLET ORAL
COMMUNITY

## 2022-04-18 RX ORDER — BUTALBITAL, ACETAMINOPHEN AND CAFFEINE 300; 40; 50 MG/1; MG/1; MG/1
CAPSULE ORAL
COMMUNITY

## 2022-04-18 RX ORDER — PROMETHAZINE HYDROCHLORIDE 25 MG/1
1 TABLET ORAL DAILY
COMMUNITY

## 2022-04-19 ENCOUNTER — ANESTHESIA EVENT (OUTPATIENT)
Dept: GASTROENTEROLOGY | Facility: HOSPITAL | Age: 57
End: 2022-04-19

## 2022-04-19 ENCOUNTER — ANESTHESIA (OUTPATIENT)
Dept: GASTROENTEROLOGY | Facility: HOSPITAL | Age: 57
End: 2022-04-19

## 2022-04-19 ENCOUNTER — HOSPITAL ENCOUNTER (OUTPATIENT)
Facility: HOSPITAL | Age: 57
Setting detail: HOSPITAL OUTPATIENT SURGERY
Discharge: HOME | End: 2022-04-19
Attending: INTERNAL MEDICINE | Admitting: INTERNAL MEDICINE

## 2022-04-19 VITALS
BODY MASS INDEX: 23.19 KG/M2 | OXYGEN SATURATION: 100 % | SYSTOLIC BLOOD PRESSURE: 103 MMHG | WEIGHT: 153 LBS | DIASTOLIC BLOOD PRESSURE: 70 MMHG | TEMPERATURE: 97.7 F | RESPIRATION RATE: 16 BRPM | HEIGHT: 68 IN | HEART RATE: 63 BPM

## 2022-04-19 DIAGNOSIS — R11.0 NAUSEA: ICD-10-CM

## 2022-04-19 DIAGNOSIS — R10.84 GENERALIZED ABDOMINAL PAIN: ICD-10-CM

## 2022-04-19 DIAGNOSIS — Z80.0 FAMILY HISTORY OF COLON CANCER IN FATHER: ICD-10-CM

## 2022-04-19 DIAGNOSIS — R63.4 WEIGHT LOSS: ICD-10-CM

## 2022-04-19 PROCEDURE — 25010000002 PROPOFOL 10 MG/ML EMULSION: Performed by: ANESTHESIOLOGY

## 2022-04-19 PROCEDURE — 88305 TISSUE EXAM BY PATHOLOGIST: CPT | Performed by: INTERNAL MEDICINE

## 2022-04-19 PROCEDURE — 43239 EGD BIOPSY SINGLE/MULTIPLE: CPT | Performed by: INTERNAL MEDICINE

## 2022-04-19 PROCEDURE — S0260 H&P FOR SURGERY: HCPCS | Performed by: INTERNAL MEDICINE

## 2022-04-19 PROCEDURE — 45378 DIAGNOSTIC COLONOSCOPY: CPT | Performed by: INTERNAL MEDICINE

## 2022-04-19 RX ORDER — PROMETHAZINE HYDROCHLORIDE 25 MG/1
25 SUPPOSITORY RECTAL ONCE AS NEEDED
Status: DISCONTINUED | OUTPATIENT
Start: 2022-04-19 | End: 2022-04-19 | Stop reason: HOSPADM

## 2022-04-19 RX ORDER — PROPOFOL 10 MG/ML
VIAL (ML) INTRAVENOUS CONTINUOUS PRN
Status: DISCONTINUED | OUTPATIENT
Start: 2022-04-19 | End: 2022-04-19 | Stop reason: SURG

## 2022-04-19 RX ORDER — SODIUM CHLORIDE, SODIUM LACTATE, POTASSIUM CHLORIDE, CALCIUM CHLORIDE 600; 310; 30; 20 MG/100ML; MG/100ML; MG/100ML; MG/100ML
1000 INJECTION, SOLUTION INTRAVENOUS CONTINUOUS
Status: DISCONTINUED | OUTPATIENT
Start: 2022-04-19 | End: 2022-04-19 | Stop reason: HOSPADM

## 2022-04-19 RX ORDER — PROMETHAZINE HYDROCHLORIDE 25 MG/1
25 TABLET ORAL ONCE AS NEEDED
Status: DISCONTINUED | OUTPATIENT
Start: 2022-04-19 | End: 2022-04-19 | Stop reason: HOSPADM

## 2022-04-19 RX ORDER — SODIUM CHLORIDE, SODIUM LACTATE, POTASSIUM CHLORIDE, CALCIUM CHLORIDE 600; 310; 30; 20 MG/100ML; MG/100ML; MG/100ML; MG/100ML
30 INJECTION, SOLUTION INTRAVENOUS CONTINUOUS PRN
Status: DISCONTINUED | OUTPATIENT
Start: 2022-04-19 | End: 2022-04-19 | Stop reason: HOSPADM

## 2022-04-19 RX ADMIN — SODIUM CHLORIDE, POTASSIUM CHLORIDE, SODIUM LACTATE AND CALCIUM CHLORIDE 30 ML/HR: 600; 310; 30; 20 INJECTION, SOLUTION INTRAVENOUS at 07:29

## 2022-04-19 RX ADMIN — PROPOFOL 200 MCG/KG/MIN: 10 INJECTION, EMULSION INTRAVENOUS at 08:02

## 2022-04-19 NOTE — H&P
[facility]  Pre Procedure History & Physical    Chief Complaint: Abdominal pain, rectal bleeding, colon cancer screening      HPI: 56 y.o. male presents for follow-up after ED visit for intermittent lower abdominal pain with rectal bleeding. He is new to our office. She will be assigned to Dr. Oliver.     Abdominal pain is constant, worsening recently. Started in the last year. Weight loss of 10+lbs over the last 2 weeks. Pain is much worse with eating so he is not eating much. Associated with nausea Denies dyspepsia/heartburn, dysphagia, vomiting. He reports profuse sweating with any PO intake.  He tried antacid OTC dialy for awhile but didn't help. No change antacids.      He denies constipation and diarrhea but states that will have the feeling of needing to go but can't always go. He has a history of hemorrhoids but does not feel there active at the time. He denies hematochezia, melena.     He saw someone for C/S couple years ago but could not tolerate the gallon prep. No history of EGD.      2/15/2022 labs CBC showed normal hemoglobin at 16.3.  CMP normal, lipase normal     2/15/2020 to CT scan abdomen pelvis with contrast was unremarkable other than boggy prostate.     CXR 9/7/21 showed negative.      FHCC in father --unknown age; 1st cousin had Crohn's disease.      He is a smoker and marijuana use. Drinks one shot of lliquir/day    Past Medical History:   Past Medical History:   Diagnosis Date   • Cervical spondylosis    • H/O paranoid schizophrenia    • History of glaucoma    • History of testicular cancer    • Rectal bleeding        Family History:  Family History   Problem Relation Age of Onset   • Colon cancer Father    • Malig Hyperthermia Neg Hx        Social History:   reports that he has been smoking cigarettes. He has been smoking about 1.00 pack per day. He does not have any smokeless tobacco history on file. He reports current alcohol use of about 3.0 standard drinks of  "alcohol per week. He reports current drug use. Frequency: 3.00 times per week. Drug: Marijuana.    Medications:   Medications Prior to Admission   Medication Sig Dispense Refill Last Dose   • QUEtiapine XR (SEROquel XR) 400 MG 24 hr tablet Take 400 mg by mouth Every Night.   4/18/2022 at Unknown time   • butalbital-acetaminophen-caffeine (ORBIVAN) -40 MG capsule capsule Take  by mouth.      • cyclobenzaprine (FLEXERIL) 10 MG tablet Take 10 mg by mouth.   More than a month at Unknown time   • Multiple Vitamins-Iron tablet Take 1 tablet by mouth Daily.   More than a month at Unknown time       Allergies:  Iodinated diagnostic agents, Oxycodone-acetaminophen, Morphine, and Penicillins    ROS:    Pertinent items are noted in HPI     Objective     Blood pressure 99/64, pulse 66, temperature 97.7 °F (36.5 °C), temperature source Oral, resp. rate 12, height 172.7 cm (68\"), weight 69.4 kg (153 lb), SpO2 94 %.    Physical Exam   Constitutional: Pt is oriented to person, place, and time and well-developed, well-nourished, and in no distress.   HENT:   Mouth/Throat: Oropharynx is clear and moist.   Neck: Normal range of motion. Neck supple.   Cardiovascular: Normal rate, regular rhythm and normal heart sounds.    Pulmonary/Chest: Effort normal and breath sounds normal. No respiratory distress. No  wheezes.   Abdominal: Soft. Bowel sounds are normal.   Skin: Skin is warm and dry.   Psychiatric: Mood, memory, affect and judgment normal.     Assessment/Plan     Diagnosis: Abdominal pain, rectal bleeding, colon cancer screening      Anticipated Surgical Procedure:  EGD  Colonoscopy    The risks, benefits, and alternatives of this procedure have been discussed with the patient or the responsible party- the patient understands and agrees to proceed.  "

## 2022-04-19 NOTE — ANESTHESIA PREPROCEDURE EVALUATION
Anesthesia Evaluation     NPO Solid Status: > 8 hours             Airway   Mallampati: I  TM distance: >3 FB  Neck ROM: full  Dental    (+) edentulous    Pulmonary - normal exam   (+) a smoker Current Smoked day of surgery,   Cardiovascular - normal exam    (-) hypertension, past MI      Neuro/Psych  (+) psychiatric history Schizophrenia,    GI/Hepatic/Renal/Endo    (+)  GI bleeding ,     Musculoskeletal     Abdominal    Substance History      OB/GYN          Other                      Anesthesia Plan    ASA 3     MAC       Anesthetic plan, all risks, benefits, and alternatives have been provided, discussed and informed consent has been obtained with: patient.        CODE STATUS:

## 2022-04-19 NOTE — DISCHARGE INSTRUCTIONS
For the next 24 hours patient needs to be with a responsible adult.    For 24 hours DO NOT drive, operate machinery, appliances, drink alcohol, make important decisions or sign legal documents.    Start with a light or bland diet if you are feeling sick to your stomach otherwise advance to regular diet as tolerated.    Follow recommendations on procedure report if provided by your doctor.    Call Dr Oliver for problems [phone]. Await pathology result 7-10 days, Call Dr. Oliver to review result.    Problems may include but not limited to: large amounts of bleeding, trouble breathing, repeated vomiting, severe unrelieved pain, fever or chills.

## 2022-04-19 NOTE — ANESTHESIA POSTPROCEDURE EVALUATION
Patient: Barrett Arriaza    Procedure Summary     Date: 04/19/22 Room / Location:  [facility]    Anesthesia Start: 0800 Anesthesia Stop: 0826    Procedures:       FLEXIBLE SIGMOIDOSCOOPYY TO 45 CM (N/A )      ESOPHAGOGASTRODUODENOSCOPY WITH COLD BIOPSIES (N/A Esophagus) Diagnosis:       Generalized abdominal pain      Weight loss      Family history of colon cancer in father      Nausea      (Generalized abdominal pain [R10.84])      (Weight loss [R63.4])      (Family history of colon cancer in father [Z80.0])      (Nausea [R11.0])    Surgeons: Alejandra Oliver MD Provider: Isiah Andre MD    Anesthesia Type: MAC ASA Status: 3          Anesthesia Type: MAC    Vitals  Vitals Value Taken Time   /70 04/19/22 0845   Temp     Pulse 63 04/19/22 0845   Resp 16 04/19/22 0845   SpO2 100 % 04/19/22 0845           Post Anesthesia Care and Evaluation    Patient location during evaluation: bedside  Pain management: adequate  Airway patency: patent  Anesthetic complications: No anesthetic complications    Cardiovascular status: acceptable  Respiratory status: acceptable  Hydration status: acceptable

## 2022-04-20 LAB
LAB AP CASE REPORT: NORMAL
PATH REPORT.FINAL DX SPEC: NORMAL
PATH REPORT.GROSS SPEC: NORMAL

## 2022-04-21 ENCOUNTER — TELEPHONE (OUTPATIENT)
Dept: GASTROENTEROLOGY | Facility: CLINIC | Age: 57
End: 2022-04-21

## 2022-04-21 DIAGNOSIS — Z80.0 FAMILY HISTORY OF COLON CANCER IN FATHER: Primary | ICD-10-CM

## 2022-04-21 DIAGNOSIS — R10.84 GENERALIZED ABDOMINAL PAIN: Primary | ICD-10-CM

## 2022-04-21 RX ORDER — SODIUM CHLORIDE, SODIUM LACTATE, POTASSIUM CHLORIDE, CALCIUM CHLORIDE 600; 310; 30; 20 MG/100ML; MG/100ML; MG/100ML; MG/100ML
30 INJECTION, SOLUTION INTRAVENOUS CONTINUOUS
Status: CANCELLED | OUTPATIENT
Start: 2022-05-10

## 2022-04-21 NOTE — TELEPHONE ENCOUNTER
----- Message from Alejandra Oliver MD sent at 4/21/2022  9:13 AM EDT -----  EGD biopsies show normal duodenum, mild chronic gastritis, no H pylori    Gastric emptying study to rule out gastroparesis (ordered)    New case request entered for his colonoscopy, he will need to complete a bowel prep    Office follow up 6-8 weeks

## 2022-04-21 NOTE — TELEPHONE ENCOUNTER
ryan Alexander for 05/10 arrive at 700/cs- mailing out prep inst on 04/21, advised pt time is subject to change [facility] will call.

## 2022-04-21 NOTE — PROGRESS NOTES
EGD biopsies show normal duodenum, mild chronic gastritis, no H pylori    Gastric emptying study to rule out gastroparesis (ordered)    New case request entered for his colonoscopy, he will need to complete a bowel prep    Office follow up 6-8 weeks

## 2022-04-21 NOTE — TELEPHONE ENCOUNTER
Call to pt.  Advise per DR Oliver note.  Verb understanding.      It is noted that c/s has been scheduled for 5/10.     Pt hung up when attempting to schedule - update to DR Oliver.

## 2022-05-10 ENCOUNTER — ANESTHESIA EVENT (OUTPATIENT)
Dept: GASTROENTEROLOGY | Facility: HOSPITAL | Age: 57
End: 2022-05-10

## 2022-05-10 ENCOUNTER — ANESTHESIA (OUTPATIENT)
Dept: GASTROENTEROLOGY | Facility: HOSPITAL | Age: 57
End: 2022-05-10

## 2022-05-10 ENCOUNTER — HOSPITAL ENCOUNTER (OUTPATIENT)
Facility: HOSPITAL | Age: 57
Setting detail: HOSPITAL OUTPATIENT SURGERY
Discharge: HOME | End: 2022-05-10
Attending: INTERNAL MEDICINE | Admitting: INTERNAL MEDICINE

## 2022-05-10 VITALS
RESPIRATION RATE: 16 BRPM | HEIGHT: 68 IN | HEART RATE: 88 BPM | SYSTOLIC BLOOD PRESSURE: 101 MMHG | DIASTOLIC BLOOD PRESSURE: 72 MMHG | OXYGEN SATURATION: 98 % | TEMPERATURE: 98.5 F | WEIGHT: 145 LBS | BODY MASS INDEX: 21.98 KG/M2

## 2022-05-10 DIAGNOSIS — Z80.0 FAMILY HISTORY OF COLON CANCER IN FATHER: ICD-10-CM

## 2022-05-10 PROCEDURE — 88305 TISSUE EXAM BY PATHOLOGIST: CPT | Performed by: INTERNAL MEDICINE

## 2022-05-10 PROCEDURE — 25010000002 PROPOFOL 10 MG/ML EMULSION: Performed by: ANESTHESIOLOGY

## 2022-05-10 PROCEDURE — 45385 COLONOSCOPY W/LESION REMOVAL: CPT | Performed by: INTERNAL MEDICINE

## 2022-05-10 PROCEDURE — 25010000002 PHENYLEPHRINE 10 MG/ML SOLUTION: Performed by: ANESTHESIOLOGY

## 2022-05-10 PROCEDURE — 45380 COLONOSCOPY AND BIOPSY: CPT | Performed by: INTERNAL MEDICINE

## 2022-05-10 RX ORDER — TRAZODONE HYDROCHLORIDE 100 MG/1
100 TABLET ORAL NIGHTLY
COMMUNITY

## 2022-05-10 RX ORDER — SODIUM CHLORIDE 0.9 % (FLUSH) 0.9 %
10 SYRINGE (ML) INJECTION AS NEEDED
Status: DISCONTINUED | OUTPATIENT
Start: 2022-05-10 | End: 2022-05-10 | Stop reason: HOSPADM

## 2022-05-10 RX ORDER — GLYCOPYRROLATE 0.2 MG/ML
INJECTION INTRAMUSCULAR; INTRAVENOUS AS NEEDED
Status: DISCONTINUED | OUTPATIENT
Start: 2022-05-10 | End: 2022-05-10 | Stop reason: SURG

## 2022-05-10 RX ORDER — SODIUM CHLORIDE, SODIUM LACTATE, POTASSIUM CHLORIDE, CALCIUM CHLORIDE 600; 310; 30; 20 MG/100ML; MG/100ML; MG/100ML; MG/100ML
30 INJECTION, SOLUTION INTRAVENOUS CONTINUOUS
Status: DISCONTINUED | OUTPATIENT
Start: 2022-05-10 | End: 2022-05-10 | Stop reason: HOSPADM

## 2022-05-10 RX ORDER — PROPOFOL 10 MG/ML
VIAL (ML) INTRAVENOUS CONTINUOUS PRN
Status: DISCONTINUED | OUTPATIENT
Start: 2022-05-10 | End: 2022-05-10 | Stop reason: SURG

## 2022-05-10 RX ORDER — PROPOFOL 10 MG/ML
VIAL (ML) INTRAVENOUS AS NEEDED
Status: DISCONTINUED | OUTPATIENT
Start: 2022-05-10 | End: 2022-05-10 | Stop reason: SURG

## 2022-05-10 RX ORDER — LIDOCAINE HYDROCHLORIDE 20 MG/ML
INJECTION, SOLUTION INFILTRATION; PERINEURAL AS NEEDED
Status: DISCONTINUED | OUTPATIENT
Start: 2022-05-10 | End: 2022-05-10 | Stop reason: SURG

## 2022-05-10 RX ORDER — SODIUM CHLORIDE 0.9 % (FLUSH) 0.9 %
10 SYRINGE (ML) INJECTION EVERY 12 HOURS SCHEDULED
Status: DISCONTINUED | OUTPATIENT
Start: 2022-05-10 | End: 2022-05-10 | Stop reason: HOSPADM

## 2022-05-10 RX ORDER — PHENYLEPHRINE HYDROCHLORIDE 10 MG/ML
INJECTION INTRAVENOUS AS NEEDED
Status: DISCONTINUED | OUTPATIENT
Start: 2022-05-10 | End: 2022-05-10 | Stop reason: SURG

## 2022-05-10 RX ADMIN — PHENYLEPHRINE HYDROCHLORIDE 150 MCG: 10 INJECTION, SOLUTION INTRAVENOUS at 08:12

## 2022-05-10 RX ADMIN — PHENYLEPHRINE HYDROCHLORIDE 150 MCG: 10 INJECTION, SOLUTION INTRAVENOUS at 08:22

## 2022-05-10 RX ADMIN — PROPOFOL 150 MG: 10 INJECTION, EMULSION INTRAVENOUS at 08:11

## 2022-05-10 RX ADMIN — Medication 180 MCG/KG/MIN: at 08:11

## 2022-05-10 RX ADMIN — LIDOCAINE HYDROCHLORIDE 40 MG: 20 INJECTION, SOLUTION INFILTRATION; PERINEURAL at 08:11

## 2022-05-10 RX ADMIN — SODIUM CHLORIDE, POTASSIUM CHLORIDE, SODIUM LACTATE AND CALCIUM CHLORIDE 30 ML/HR: 600; 310; 30; 20 INJECTION, SOLUTION INTRAVENOUS at 07:51

## 2022-05-10 RX ADMIN — GLYCOPYRROLATE 0.2 MG: 0.2 INJECTION INTRAMUSCULAR; INTRAVENOUS at 08:11

## 2022-05-10 RX ADMIN — PHENYLEPHRINE HYDROCHLORIDE 150 MCG: 10 INJECTION, SOLUTION INTRAVENOUS at 08:15

## 2022-05-10 NOTE — H&P
[facility]  Pre Procedure History & Physical    Chief Complaint:  Family history of colon cancer, rectal bleeding      HPI:   56 y.o. male presents for follow-up after ED visit for intermittent lower abdominal pain with rectal bleeding. He is new to our office. She will be assigned to Dr. Oliver.     Abdominal pain is constant, worsening recently. Started in the last year. Weight loss of 10+lbs over the last 2 weeks. Pain is much worse with eating so he is not eating much. Associated with nausea Denies dyspepsia/heartburn, dysphagia, vomiting. He reports profuse sweating with any PO intake.  He tried antacid OTC dialy for awhile but didn't help. No change antacids.      He denies constipation and diarrhea but states that will have the feeling of needing to go but can't always go. He has a history of hemorrhoids but does not feel there active at the time. He denies hematochezia, melena.     He saw someone for C/S couple years ago but could not tolerate the gallon prep. No history of EGD.      2/15/2022 labs CBC showed normal hemoglobin at 16.3.  CMP normal, lipase normal     2/15/2020 to CT scan abdomen pelvis with contrast was unremarkable other than boggy prostate.     CXR 9/7/21 showed negative.      FHCC in father --unknown age; 1st cousin had Crohn's disease.      He is a smoker and marijuana use. Drinks one shot of lliquir/day  Past Medical History:   Past Medical History:   Diagnosis Date   • Cervical spondylosis    • H/O paranoid schizophrenia    • History of glaucoma    • History of testicular cancer    • Rectal bleeding        Family History:  Family History   Problem Relation Age of Onset   • Colon cancer Father    • Malig Hyperthermia Neg Hx        Social History:   reports that he has been smoking cigarettes. He has been smoking about 1.00 pack per day. He does not have any smokeless tobacco history on file. He reports current alcohol use of about 3.0 standard drinks of alcohol  per week. He reports current drug use. Frequency: 3.00 times per week. Drug: Marijuana.    Medications:   Medications Prior to Admission   Medication Sig Dispense Refill Last Dose   • cyclobenzaprine (FLEXERIL) 10 MG tablet Take 10 mg by mouth.   Past Month at Unknown time   • Multiple Vitamins-Iron tablet Take 1 tablet by mouth Daily.   Past Week at Unknown time   • traZODone (DESYREL) 100 MG tablet Take 100 mg by mouth Every Night.   5/9/2022 at Unknown time   • butalbital-acetaminophen-caffeine (ORBIVAN) -40 MG capsule capsule Take  by mouth.   Unknown at Unknown time   • QUEtiapine XR (SEROquel XR) 400 MG 24 hr tablet Take 400 mg by mouth Every Night.   5/8/2022       Allergies:  Iodinated diagnostic agents, Oxycodone-acetaminophen, Morphine, and Penicillins    ROS:    Pertinent items are noted in HPI     Objective     There were no vitals taken for this visit.    Physical Exam   Constitutional: Pt is oriented to person, place, and time and well-developed, well-nourished, and in no distress.   HENT:   Mouth/Throat: Oropharynx is clear and moist.   Neck: Normal range of motion. Neck supple.   Cardiovascular: Normal rate, regular rhythm and normal heart sounds.    Pulmonary/Chest: Effort normal and breath sounds normal. No respiratory distress. No  wheezes.   Abdominal: Soft. Bowel sounds are normal.   Skin: Skin is warm and dry.   Psychiatric: Mood, memory, affect and judgment normal.       Diagnosis: Family history of colon cancer, rectal bleeding      Anticipated Surgical Procedure:    Colonoscopy    The risks, benefits, and alternatives of this procedure have been discussed with the patient or the responsible party- the patient understands and agrees to proceed.

## 2022-05-10 NOTE — ANESTHESIA PREPROCEDURE EVALUATION
Anesthesia Evaluation     Patient summary reviewed and Nursing notes reviewed   no history of anesthetic complications:  NPO Solid Status: > 8 hours  NPO Liquid Status: > 2 hours           Airway   Mallampati: II  TM distance: >3 FB  Neck ROM: full  no difficulty expected  Dental - normal exam   (+) edentulous    Pulmonary - normal exam   (+) a smoker Current Smoked day of surgery,   (-) COPD, asthma, lung cancer  Cardiovascular - negative cardio ROS and normal exam  Exercise tolerance: excellent (>7 METS)    Rhythm: regular  Rate: normal    (-) hypertension, valvular problems/murmurs, past MI, CAD, dysrhythmias, angina, CHF, cardiac stents, pericardial effusion      Neuro/Psych  (+) psychiatric history,    (-) seizures, TIA, CVA    ROS Comment: Hx/o paranoid schizophrenia.  GI/Hepatic/Renal/Endo    (+)  GI bleeding lower resolved,   (-) hiatal hernia, GERD, PUD, hepatitis, liver disease, no renal disease, diabetes, no thyroid disorder    Musculoskeletal     (+) neck pain,   Abdominal  - normal exam   Substance History   (+) drug use      Comment: Frequent THC use   OB/GYN negative ob/gyn ROS         Other   arthritis,                    Anesthesia Plan    ASA 2     MAC     intravenous induction     Anesthetic plan, all risks, benefits, and alternatives have been provided, discussed and informed consent has been obtained with: patient.        CODE STATUS:

## 2022-05-10 NOTE — ANESTHESIA POSTPROCEDURE EVALUATION
Patient: Barrett Arriaza    Procedure Summary     Date: 05/10/22 Room / Location:  [facility]    Anesthesia Start: 0807 Anesthesia Stop: 0835    Procedure: COLONOSCOPY into cecum and terminal ileum with cold snare and cold biopsy polypectomy (N/A ) Diagnosis:       Family history of colon cancer in father      (Family history of colon cancer in father [Z80.0])    Surgeons: Alejandra Oliver MD Provider: Addy Curry MD    Anesthesia Type: MAC ASA Status: 2          Anesthesia Type: MAC    Vitals  No vitals data found for the desired time range.          Post Anesthesia Care and Evaluation    Patient location during evaluation: bedside  Patient participation: complete - patient participated  Level of consciousness: responsive to light touch, responsive to verbal stimuli and sleepy but conscious  Pain score: 0  Pain management: adequate  Airway patency: patent  Anesthetic complications: No anesthetic complications  PONV Status: none  Cardiovascular status: acceptable and hemodynamically stable  Respiratory status: acceptable  Hydration status: acceptable

## 2022-05-11 LAB
LAB AP CASE REPORT: NORMAL
PATH REPORT.FINAL DX SPEC: NORMAL
PATH REPORT.GROSS SPEC: NORMAL

## 2022-05-12 ENCOUNTER — TELEPHONE (OUTPATIENT)
Dept: GASTROENTEROLOGY | Facility: CLINIC | Age: 57
End: 2022-05-12

## 2022-05-12 NOTE — TELEPHONE ENCOUNTER
----- Message from Alejandra Oliver MD sent at 5/12/2022  2:40 PM EDT -----  His polyps included 1 benign hyperplastic polyp, 1 tubular adenoma    Please place in 5-year colonoscopy recall, thank you

## 2022-05-12 NOTE — PROGRESS NOTES
His polyps included 1 benign hyperplastic polyp, 1 tubular adenoma    Please place in 5-year colonoscopy recall, thank you

## 2022-06-29 ENCOUNTER — TELEPHONE (OUTPATIENT)
Dept: GASTROENTEROLOGY | Facility: CLINIC | Age: 57
End: 2022-06-29

## 2022-06-29 ENCOUNTER — HOSPITAL ENCOUNTER (OUTPATIENT)
Dept: NUCLEAR MEDICINE | Facility: HOSPITAL | Age: 57
Discharge: HOME | End: 2022-06-29

## 2022-06-29 ENCOUNTER — OFFICE VISIT (OUTPATIENT)
Dept: GASTROENTEROLOGY | Facility: CLINIC | Age: 57
End: 2022-06-29

## 2022-06-29 VITALS
SYSTOLIC BLOOD PRESSURE: 102 MMHG | BODY MASS INDEX: 22.05 KG/M2 | HEIGHT: 68 IN | HEART RATE: 69 BPM | DIASTOLIC BLOOD PRESSURE: 67 MMHG | TEMPERATURE: 96.4 F

## 2022-06-29 DIAGNOSIS — R11.0 NAUSEA: Primary | ICD-10-CM

## 2022-06-29 DIAGNOSIS — K64.8 INTERNAL HEMORRHOIDS: ICD-10-CM

## 2022-06-29 DIAGNOSIS — K62.5 RECTAL BLEEDING: ICD-10-CM

## 2022-06-29 DIAGNOSIS — R10.84 GENERALIZED ABDOMINAL PAIN: ICD-10-CM

## 2022-06-29 DIAGNOSIS — R19.7 DIARRHEA, UNSPECIFIED TYPE: ICD-10-CM

## 2022-06-29 DIAGNOSIS — F12.90 MARIJUANA USE, CONTINUOUS: ICD-10-CM

## 2022-06-29 PROCEDURE — A9541 TC99M SULFUR COLLOID: HCPCS | Performed by: INTERNAL MEDICINE

## 2022-06-29 PROCEDURE — 78264 GASTRIC EMPTYING IMG STUDY: CPT

## 2022-06-29 PROCEDURE — 99215 OFFICE O/P EST HI 40 MIN: CPT | Performed by: PHYSICIAN ASSISTANT

## 2022-06-29 PROCEDURE — 0 TECHNETIUM SULFUR COLLOID: Performed by: INTERNAL MEDICINE

## 2022-06-29 RX ORDER — DICYCLOMINE HYDROCHLORIDE 10 MG/1
10 CAPSULE ORAL
Qty: 120 CAPSULE | Refills: 1 | Status: SHIPPED | OUTPATIENT
Start: 2022-06-29 | End: 2022-08-10 | Stop reason: SDUPTHER

## 2022-06-29 RX ORDER — HYDROCORTISONE ACETATE 25 MG/1
25 SUPPOSITORY RECTAL NIGHTLY PRN
Qty: 30 SUPPOSITORY | Refills: 1 | Status: SHIPPED | OUTPATIENT
Start: 2022-06-29

## 2022-06-29 RX ADMIN — TECHNETIUM TC 99M SULFUR COLLOID 1 DOSE: KIT at 08:28

## 2022-06-29 NOTE — TELEPHONE ENCOUNTER
Caller: Barrett Arriaza    Relationship: Self    Best call back number: [phone]    Who are you requesting to speak with (clinical staff, provider,  specific staff member): DR LAILA JEAN-BAPTISTE    Do you know the name of the person who called: BARRETT ARRIAZA    What was the call regarding: PT ADVISED HE IS AT THE LAB TAKING TESTS, HE IS IN PAIN AND CAN'T GET ANY ANSWERS TO PREVIOUS TEST RESULTS.   I WAS ABOUT TO WARM TRANSFER BUT HE HUNG UP BEFORE I COULD CALL    Do you require a callback: YES

## 2022-06-29 NOTE — TELEPHONE ENCOUNTER
"Caller: Barrett Arriaza    Relationship to patient: Self    Best call back number: [phone]    Patient is needing: PT CALLED AND WANTED TO SPEAK WITH LAILA JEAN-BAPTISTE, HE DOES NOT WANT TO SPEAK TO A NURSE. HE IS HAVING SEVERE STOMACH PAIN AND HE STATED HE KEEPS GETTING TOLD TO \"TALK TO HIS DOCTOR\" BUT CAN NEVER GET IN TOUCH WITH HER. UNABLE TO WARM TRANSFER PT TO OFFICE.   "

## 2022-06-29 NOTE — TELEPHONE ENCOUNTER
Caller: LOUIE MASTERS    Relationship to patient: SELF    Best call back number: [phone]     Patient is needing: NEEDING A CALL BACK FROM DR JEAN-BAPTISTE OR THE NURSE, THE  PATIENT IS AT THE IMAGING VISIT IN SEVERE PAIN IN STOMACH AND FEEL WEAK. HAVE QUESTION PERTAINING TO  HIS ISSUES. PLEASE CALL AS SOON AS POSSIBLE.  THE PATIENT IS ON HIS WAY TO THE EMERGENCY DEPARTMENT.

## 2022-06-30 NOTE — PROGRESS NOTES
Patient would prefer to receive results in person per his request at yesterday's visit. I will discuss when he returns for follow up.

## 2022-08-10 ENCOUNTER — OFFICE VISIT (OUTPATIENT)
Dept: GASTROENTEROLOGY | Facility: CLINIC | Age: 57
End: 2022-08-10

## 2022-08-10 VITALS
WEIGHT: 148.2 LBS | SYSTOLIC BLOOD PRESSURE: 110 MMHG | HEIGHT: 68 IN | BODY MASS INDEX: 22.46 KG/M2 | TEMPERATURE: 97.5 F | DIASTOLIC BLOOD PRESSURE: 70 MMHG

## 2022-08-10 DIAGNOSIS — R11.0 NAUSEA: ICD-10-CM

## 2022-08-10 DIAGNOSIS — R10.84 GENERALIZED ABDOMINAL PAIN: ICD-10-CM

## 2022-08-10 DIAGNOSIS — R19.7 DIARRHEA, UNSPECIFIED TYPE: Primary | ICD-10-CM

## 2022-08-10 PROCEDURE — 99214 OFFICE O/P EST MOD 30 MIN: CPT | Performed by: PHYSICIAN ASSISTANT

## 2022-08-10 RX ORDER — DICYCLOMINE HYDROCHLORIDE 10 MG/1
10 CAPSULE ORAL
Qty: 120 CAPSULE | Refills: 1 | Status: SHIPPED | OUTPATIENT
Start: 2022-08-10

## 2022-08-10 RX ORDER — MONTELUKAST SODIUM 4 MG/1
TABLET, CHEWABLE ORAL
Qty: 60 TABLET | Refills: 11 | Status: SHIPPED | OUTPATIENT
Start: 2022-08-10

## 2022-08-10 NOTE — PROGRESS NOTES
Chief Complaint  Abdominal Pain and Rectal Bleeding    Subjective        History of Present Illness  Barrett Arriaza is a  56 y.o. male here for follow-up for abdominal pain, nausea and rectal bleeding.  He is a patient of Dr. Oliver.    Since last visit, he has decreased his marijuana use in half and has noted significant improvement nausea further episodes of vomiting.  He continues to have diarrhea 3-4 times a day which is watery in nature with occasional rectal bleeding.  He was prescribed Bentyl at his last appointment but states the pharmacies told them they never received it.  He is status postcholecystectomy.    Gastric emptying study completed 6/29/2022 was normal.    Last colonoscopy was completed 5/10/2022: Sigmoid diverticulosis, large nonbleeding internal hemorrhoids, tubular adenoma and a hyperplastic polyp removed.  Colonoscopy recall 5 years.      Past Medical History:   Diagnosis Date   • Cervical spondylosis    • H/O paranoid schizophrenia    • History of glaucoma    • History of testicular cancer    • Rectal bleeding        Past Surgical History:   Procedure Laterality Date   • CHOLECYSTECTOMY     • COLONOSCOPY N/A 4/19/2022    Procedure: FLEXIBLE SIGMOIDOSCOOPYY TO 45 CM;  Surgeon: Alejandra Oliver MD;  Location: [facility] ENDOSCOPY;  Service: Gastroenterology;  Laterality: N/A;  PRE: SCREENING  POST: INTERNAL AND EXTERNAL HEMORRHOIDS   • COLONOSCOPY N/A 5/10/2022    Procedure: COLONOSCOPY into cecum and terminal ileum with cold snare and cold biopsy polypectomy;  Surgeon: Alejandra Oliver MD;  Location: [facility] ENDOSCOPY;  Service: Gastroenterology;  Laterality: N/A;  Pre op: Family history of colon cancer, Rectal Bleeding  Post op: Polyps, Diverticulosis, Large Hemorrhoids   • ENDOSCOPY N/A 4/19/2022    Procedure: ESOPHAGOGASTRODUODENOSCOPY WITH COLD BIOPSIES;  Surgeon: Alejandra Oliver MD;  Location: [facility] ENDOSCOPY;  Service: Gastroenterology;  Laterality: N/A;  PRE: ABD PAIN  POST: GASTRITIS,  RETAINED FOOD   • EYE ENUCLEATION Right    • EYE SURGERY     • HAND TENDON REPAIR Left    • NECK SURGERY  2001   • SCROTAL SURGERY         Family History   Problem Relation Age of Onset   • Colon cancer Father    • Malig Hyperthermia Neg Hx        Social History     Socioeconomic History   • Marital status:    Tobacco Use   • Smoking status: Current Some Day Smoker     Packs/day: 1.00     Types: Cigarettes   Substance and Sexual Activity   • Alcohol use: Yes     Alcohol/week: 3.0 standard drinks     Types: 3 Cans of beer per week   • Drug use: Yes     Frequency: 3.0 times per week     Types: Marijuana     Comment: THREE TIMES A WEEK       Allergies   Allergen Reactions   • Iodinated Diagnostic Agents Itching   • Oxycodone-Acetaminophen Hives     Per pt can take hydroCODONE    • Morphine Swelling   • Penicillins        Current Outpatient Medications on File Prior to Visit   Medication Sig Dispense Refill   • butalbital-acetaminophen-caffeine (ORBIVAN) -40 MG capsule capsule Take  by mouth.     • cyclobenzaprine (FLEXERIL) 10 MG tablet Take 10 mg by mouth.     • hydrocortisone (ANUSOL-HC) 25 MG suppository Insert 1 suppository into the rectum At Night As Needed for Hemorrhoids (rectal discomfort/bleeding). 30 suppository 1   • Multiple Vitamins-Iron tablet Take 1 tablet by mouth Daily.     • QUEtiapine XR (SEROquel XR) 400 MG 24 hr tablet Take 400 mg by mouth Every Night.     • traZODone (DESYREL) 100 MG tablet Take 100 mg by mouth Every Night.     • [DISCONTINUED] dicyclomine (Bentyl) 10 MG capsule Take 1 capsule by mouth 4 (Four) Times a Day Before Meals & at Bedtime. 120 capsule 1     No current facility-administered medications on file prior to visit.       Review of Systems   Constitutional: Negative for chills and fever.   HENT: Negative for trouble swallowing.    Respiratory: Negative for cough and shortness of breath.    Cardiovascular: Negative for chest pain and palpitations.   Gastrointestinal:  "Positive for abdominal pain, anal bleeding and diarrhea. Negative for nausea, vomiting, GERD and indigestion.        Objective   Vital Signs:   /70   Temp 97.5 °F (36.4 °C)   Ht 172.7 cm (68\")   Wt 67.2 kg (148 lb 3.2 oz)   BMI 22.53 kg/m²       Physical Exam  Vitals and nursing note reviewed.   Constitutional:       General: He is not in acute distress.     Appearance: Normal appearance. He is not ill-appearing.   HENT:      Head: Normocephalic and atraumatic.      Right Ear: External ear normal.      Left Ear: External ear normal.   Eyes:      General: No scleral icterus.     Conjunctiva/sclera: Conjunctivae normal.      Pupils: Pupils are equal, round, and reactive to light.   Cardiovascular:      Rate and Rhythm: Normal rate and regular rhythm.      Heart sounds: Normal heart sounds.   Pulmonary:      Effort: Pulmonary effort is normal.      Breath sounds: Normal breath sounds.   Abdominal:      General: Abdomen is flat. Bowel sounds are normal. There is no distension.      Palpations: Abdomen is soft.      Tenderness: There is no abdominal tenderness. There is no guarding or rebound.   Musculoskeletal:      Cervical back: Normal range of motion and neck supple.   Skin:     General: Skin is warm and dry.   Neurological:      Mental Status: He is alert and oriented to person, place, and time.   Psychiatric:         Mood and Affect: Mood normal.         Behavior: Behavior normal.          Result Review :       Common labs    Common Labsle 9/7/21 10/26/21 2/15/22 2/15/22 2/15/22      0922 1435 1435   Glucose     86   BUN     8   Creatinine     0.79   eGFR African Am     124   Sodium     139   Potassium     3.6   Chloride     101   Calcium     9.2   Albumin     4.20   Total Bilirubin     0.6   Alkaline Phosphatase     79   AST (SGOT)     24   ALT (SGPT)     28   WBC 6.32 4.36 (A) 5.07 4.44    Hemoglobin 16.2 17.2 16.9 16.3    Hematocrit 46.2 50.0 49.0 45.6    Platelets 268 235 291 251    (A) Abnormal value "                                Assessment and Plan    Diagnoses and all orders for this visit:    1. Diarrhea, unspecified type (Primary)    2. Generalized abdominal pain    3. Nausea    Other orders  -     colestipol (COLESTID) 1 g tablet; Start with one pill daily and increase to 2 daily if no improvement after 2 weeks  Dispense: 60 tablet; Refill: 11  -     dicyclomine (Bentyl) 10 MG capsule; Take 1 capsule by mouth 4 (Four) Times a Day Before Meals & at Bedtime.  Dispense: 120 capsule; Refill: 1      · Again recommend abstinence from marijuana as this likely was causative agent of his nausea.  · Trial Colestid for diarrhea particular given that he has undergone cholecystectomy.  · Follow-up in 3 months, sooner if necessary      Follow Up   Return in about 3 months (around 11/10/2022).    Dragon dictation used throughout this note.     DORINA Pan

## 2022-09-01 ENCOUNTER — HOSPITAL ENCOUNTER (EMERGENCY)
Facility: HOSPITAL | Age: 57
Discharge: LEFT BEFORE DISCHARGE | End: 2022-09-01

## 2022-09-01 VITALS
BODY MASS INDEX: 22.53 KG/M2 | DIASTOLIC BLOOD PRESSURE: 64 MMHG | RESPIRATION RATE: 18 BRPM | SYSTOLIC BLOOD PRESSURE: 103 MMHG | OXYGEN SATURATION: 97 % | HEIGHT: 68 IN | TEMPERATURE: 97.4 F | HEART RATE: 77 BPM

## 2022-09-01 PROCEDURE — 99211 OFF/OP EST MAY X REQ PHY/QHP: CPT

## 2022-09-01 NOTE — ED NOTES
Pt ambulatory to triage from home via [facility] [phone] with c/o cough and difficulty breathing x 3 days.  Pt denies covid exposure.  Pt also c/o sore throat and flu-like symptoms.  Pt also c/o subjective fever. Pt wearing mask in triage. Triage personnel wore appropriate PPE

## 2023-05-30 ENCOUNTER — TRANSCRIBE ORDERS (OUTPATIENT)
Dept: CARDIOLOGY | Facility: HOSPITAL | Age: 58
End: 2023-05-30

## 2023-05-30 ENCOUNTER — LAB (OUTPATIENT)
Dept: LAB | Facility: HOSPITAL | Age: 58
End: 2023-05-30

## 2023-05-30 ENCOUNTER — HOSPITAL ENCOUNTER (OUTPATIENT)
Dept: CARDIOLOGY | Facility: HOSPITAL | Age: 58
Discharge: HOME | End: 2023-05-30

## 2023-05-30 ENCOUNTER — TRANSCRIBE ORDERS (OUTPATIENT)
Dept: ADMINISTRATIVE | Facility: HOSPITAL | Age: 58
End: 2023-05-30

## 2023-05-30 DIAGNOSIS — R97.20 ELEVATED PROSTATE SPECIFIC ANTIGEN (PSA): ICD-10-CM

## 2023-05-30 DIAGNOSIS — R97.20 ELEVATED PROSTATE SPECIFIC ANTIGEN (PSA): Primary | ICD-10-CM

## 2023-05-30 DIAGNOSIS — Z01.811 PRE-OP CHEST EXAM: Primary | ICD-10-CM

## 2023-05-30 LAB
ANION GAP SERPL CALCULATED.3IONS-SCNC: 6.7 MMOL/L (ref 5–15)
BASOPHILS # BLD AUTO: 0.05 10*3/MM3 (ref 0–0.2)
BASOPHILS NFR BLD AUTO: 1.1 % (ref 0–1.5)
BUN SERPL-MCNC: 8 MG/DL (ref 6–20)
BUN/CREAT SERPL: 8.3 (ref 7–25)
CALCIUM SPEC-SCNC: 9.2 MG/DL (ref 8.6–10.5)
CHLORIDE SERPL-SCNC: 104 MMOL/L (ref 98–107)
CO2 SERPL-SCNC: 27.3 MMOL/L (ref 22–29)
CREAT SERPL-MCNC: 0.96 MG/DL (ref 0.76–1.27)
DEPRECATED RDW RBC AUTO: 43.9 FL (ref 37–54)
EGFRCR SERPLBLD CKD-EPI 2021: 93.3 ML/MIN/1.73
EOSINOPHIL # BLD AUTO: 0.53 10*3/MM3 (ref 0–0.4)
EOSINOPHIL NFR BLD AUTO: 11.4 % (ref 0.3–6.2)
ERYTHROCYTE [DISTWIDTH] IN BLOOD BY AUTOMATED COUNT: 12.9 % (ref 12.3–15.4)
GLUCOSE SERPL-MCNC: 86 MG/DL (ref 65–99)
HCT VFR BLD AUTO: 47.5 % (ref 37.5–51)
HGB BLD-MCNC: 16.3 G/DL (ref 13–17.7)
IMM GRANULOCYTES # BLD AUTO: 0.01 10*3/MM3 (ref 0–0.05)
IMM GRANULOCYTES NFR BLD AUTO: 0.2 % (ref 0–0.5)
LYMPHOCYTES # BLD AUTO: 2.06 10*3/MM3 (ref 0.7–3.1)
LYMPHOCYTES NFR BLD AUTO: 44.2 % (ref 19.6–45.3)
MCH RBC QN AUTO: 32.1 PG (ref 26.6–33)
MCHC RBC AUTO-ENTMCNC: 34.3 G/DL (ref 31.5–35.7)
MCV RBC AUTO: 93.5 FL (ref 79–97)
MONOCYTES # BLD AUTO: 0.74 10*3/MM3 (ref 0.1–0.9)
MONOCYTES NFR BLD AUTO: 15.9 % (ref 5–12)
NEUTROPHILS NFR BLD AUTO: 1.27 10*3/MM3 (ref 1.7–7)
NEUTROPHILS NFR BLD AUTO: 27.2 % (ref 42.7–76)
NRBC BLD AUTO-RTO: 0 /100 WBC (ref 0–0.2)
PLATELET # BLD AUTO: 241 10*3/MM3 (ref 140–450)
PMV BLD AUTO: 9.2 FL (ref 6–12)
POTASSIUM SERPL-SCNC: 3.7 MMOL/L (ref 3.5–5.2)
QT INTERVAL: 387 MS
RBC # BLD AUTO: 5.08 10*6/MM3 (ref 4.14–5.8)
SODIUM SERPL-SCNC: 138 MMOL/L (ref 136–145)
WBC NRBC COR # BLD: 4.66 10*3/MM3 (ref 3.4–10.8)

## 2023-05-30 PROCEDURE — 93005 ELECTROCARDIOGRAM TRACING: CPT

## 2023-05-30 PROCEDURE — 36415 COLL VENOUS BLD VENIPUNCTURE: CPT

## 2023-05-30 PROCEDURE — 85025 COMPLETE CBC W/AUTO DIFF WBC: CPT | Performed by: UROLOGY

## 2023-05-30 PROCEDURE — 80048 BASIC METABOLIC PNL TOTAL CA: CPT

## 2023-06-08 ENCOUNTER — HOSPITAL ENCOUNTER (EMERGENCY)
Facility: HOSPITAL | Age: 58
Discharge: HOME | End: 2023-06-08
Attending: EMERGENCY MEDICINE
Payer: COMMERCIAL

## 2023-06-08 ENCOUNTER — APPOINTMENT (OUTPATIENT)
Dept: CT IMAGING | Facility: HOSPITAL | Age: 58
End: 2023-06-08
Payer: COMMERCIAL

## 2023-06-08 VITALS
SYSTOLIC BLOOD PRESSURE: 143 MMHG | RESPIRATION RATE: 16 BRPM | DIASTOLIC BLOOD PRESSURE: 98 MMHG | HEART RATE: 57 BPM | TEMPERATURE: 98.9 F | HEIGHT: 68 IN | WEIGHT: 156 LBS | OXYGEN SATURATION: 97 % | BODY MASS INDEX: 23.64 KG/M2

## 2023-06-08 DIAGNOSIS — K62.89 PROCTITIS: Primary | ICD-10-CM

## 2023-06-08 LAB
ALBUMIN SERPL-MCNC: 4.1 G/DL (ref 3.5–5.2)
ALBUMIN/GLOB SERPL: 1.6 G/DL
ALP SERPL-CCNC: 85 U/L (ref 39–117)
ALT SERPL W P-5'-P-CCNC: 28 U/L (ref 1–41)
ANION GAP SERPL CALCULATED.3IONS-SCNC: 8 MMOL/L (ref 5–15)
AST SERPL-CCNC: 28 U/L (ref 1–40)
BACTERIA UR QL AUTO: ABNORMAL /HPF
BASOPHILS # BLD AUTO: 0.05 10*3/MM3 (ref 0–0.2)
BASOPHILS NFR BLD AUTO: 0.9 % (ref 0–1.5)
BILIRUB SERPL-MCNC: 0.4 MG/DL (ref 0–1.2)
BILIRUB UR QL STRIP: NEGATIVE
BUN SERPL-MCNC: 10 MG/DL (ref 6–20)
BUN/CREAT SERPL: 11.6 (ref 7–25)
CALCIUM SPEC-SCNC: 8.8 MG/DL (ref 8.6–10.5)
CHLORIDE SERPL-SCNC: 104 MMOL/L (ref 98–107)
CLARITY UR: CLEAR
CO2 SERPL-SCNC: 26 MMOL/L (ref 22–29)
COLOR UR: YELLOW
CREAT SERPL-MCNC: 0.86 MG/DL (ref 0.76–1.27)
D-LACTATE SERPL-SCNC: 1.3 MMOL/L (ref 0.5–2)
DEPRECATED RDW RBC AUTO: 41.8 FL (ref 37–54)
EGFRCR SERPLBLD CKD-EPI 2021: 102.3 ML/MIN/1.73
EOSINOPHIL # BLD AUTO: 0.46 10*3/MM3 (ref 0–0.4)
EOSINOPHIL NFR BLD AUTO: 7.8 % (ref 0.3–6.2)
ERYTHROCYTE [DISTWIDTH] IN BLOOD BY AUTOMATED COUNT: 12.5 % (ref 12.3–15.4)
GLOBULIN UR ELPH-MCNC: 2.6 GM/DL
GLUCOSE SERPL-MCNC: 94 MG/DL (ref 65–99)
GLUCOSE UR STRIP-MCNC: NEGATIVE MG/DL
HCT VFR BLD AUTO: 45.9 % (ref 37.5–51)
HGB BLD-MCNC: 16.2 G/DL (ref 13–17.7)
HGB UR QL STRIP.AUTO: ABNORMAL
HYALINE CASTS UR QL AUTO: ABNORMAL /LPF
IMM GRANULOCYTES # BLD AUTO: 0.02 10*3/MM3 (ref 0–0.05)
IMM GRANULOCYTES NFR BLD AUTO: 0.3 % (ref 0–0.5)
KETONES UR QL STRIP: NEGATIVE
LEUKOCYTE ESTERASE UR QL STRIP.AUTO: NEGATIVE
LYMPHOCYTES # BLD AUTO: 2.92 10*3/MM3 (ref 0.7–3.1)
LYMPHOCYTES NFR BLD AUTO: 49.7 % (ref 19.6–45.3)
MCH RBC QN AUTO: 32.4 PG (ref 26.6–33)
MCHC RBC AUTO-ENTMCNC: 35.3 G/DL (ref 31.5–35.7)
MCV RBC AUTO: 91.8 FL (ref 79–97)
MONOCYTES # BLD AUTO: 0.8 10*3/MM3 (ref 0.1–0.9)
MONOCYTES NFR BLD AUTO: 13.6 % (ref 5–12)
NEUTROPHILS NFR BLD AUTO: 1.63 10*3/MM3 (ref 1.7–7)
NEUTROPHILS NFR BLD AUTO: 27.7 % (ref 42.7–76)
NITRITE UR QL STRIP: NEGATIVE
NRBC BLD AUTO-RTO: 0 /100 WBC (ref 0–0.2)
PH UR STRIP.AUTO: 5.5 [PH] (ref 5–8)
PLATELET # BLD AUTO: 238 10*3/MM3 (ref 140–450)
PMV BLD AUTO: 9.2 FL (ref 6–12)
POTASSIUM SERPL-SCNC: 3.8 MMOL/L (ref 3.5–5.2)
PROCALCITONIN SERPL-MCNC: 0.03 NG/ML (ref 0–0.25)
PROT SERPL-MCNC: 6.7 G/DL (ref 6–8.5)
PROT UR QL STRIP: NEGATIVE
RBC # BLD AUTO: 5 10*6/MM3 (ref 4.14–5.8)
RBC # UR STRIP: ABNORMAL /HPF
REF LAB TEST METHOD: ABNORMAL
SODIUM SERPL-SCNC: 138 MMOL/L (ref 136–145)
SP GR UR STRIP: 1.02 (ref 1–1.03)
SQUAMOUS #/AREA URNS HPF: ABNORMAL /HPF
UROBILINOGEN UR QL STRIP: ABNORMAL
WBC # UR STRIP: ABNORMAL /HPF
WBC NRBC COR # BLD: 5.88 10*3/MM3 (ref 3.4–10.8)

## 2023-06-08 PROCEDURE — 81001 URINALYSIS AUTO W/SCOPE: CPT | Performed by: EMERGENCY MEDICINE

## 2023-06-08 PROCEDURE — 80053 COMPREHEN METABOLIC PANEL: CPT | Performed by: EMERGENCY MEDICINE

## 2023-06-08 PROCEDURE — 25010000002 CEFTRIAXONE PER 250 MG: Performed by: EMERGENCY MEDICINE

## 2023-06-08 PROCEDURE — 84145 PROCALCITONIN (PCT): CPT | Performed by: EMERGENCY MEDICINE

## 2023-06-08 PROCEDURE — 96365 THER/PROPH/DIAG IV INF INIT: CPT

## 2023-06-08 PROCEDURE — 36415 COLL VENOUS BLD VENIPUNCTURE: CPT

## 2023-06-08 PROCEDURE — 25010000002 ONDANSETRON PER 1 MG: Performed by: EMERGENCY MEDICINE

## 2023-06-08 PROCEDURE — 87040 BLOOD CULTURE FOR BACTERIA: CPT | Performed by: EMERGENCY MEDICINE

## 2023-06-08 PROCEDURE — 83605 ASSAY OF LACTIC ACID: CPT | Performed by: EMERGENCY MEDICINE

## 2023-06-08 PROCEDURE — 74176 CT ABD & PELVIS W/O CONTRAST: CPT

## 2023-06-08 PROCEDURE — 85025 COMPLETE CBC W/AUTO DIFF WBC: CPT | Performed by: EMERGENCY MEDICINE

## 2023-06-08 PROCEDURE — 25010000002 HYDROMORPHONE PER 4 MG: Performed by: EMERGENCY MEDICINE

## 2023-06-08 PROCEDURE — 99284 EMERGENCY DEPT VISIT MOD MDM: CPT

## 2023-06-08 PROCEDURE — 96375 TX/PRO/DX INJ NEW DRUG ADDON: CPT

## 2023-06-08 RX ORDER — LURASIDONE HYDROCHLORIDE 60 MG/1
60 TABLET, FILM COATED ORAL DAILY
COMMUNITY
Start: 2023-02-22

## 2023-06-08 RX ORDER — METRONIDAZOLE 500 MG/1
500 TABLET ORAL 2 TIMES DAILY
Qty: 14 TABLET | Refills: 0 | Status: SHIPPED | OUTPATIENT
Start: 2023-06-08

## 2023-06-08 RX ORDER — HYDROMORPHONE HYDROCHLORIDE 1 MG/ML
0.5 INJECTION, SOLUTION INTRAMUSCULAR; INTRAVENOUS; SUBCUTANEOUS ONCE
Status: COMPLETED | OUTPATIENT
Start: 2023-06-08 | End: 2023-06-08

## 2023-06-08 RX ORDER — HYDROCODONE BITARTRATE AND ACETAMINOPHEN 5; 325 MG/1; MG/1
1 TABLET ORAL EVERY 6 HOURS PRN
Qty: 8 TABLET | Refills: 0 | Status: SHIPPED | OUTPATIENT
Start: 2023-06-08

## 2023-06-08 RX ORDER — ONDANSETRON 2 MG/ML
4 INJECTION INTRAMUSCULAR; INTRAVENOUS ONCE
Status: COMPLETED | OUTPATIENT
Start: 2023-06-08 | End: 2023-06-08

## 2023-06-08 RX ORDER — CITALOPRAM 20 MG/1
20 TABLET ORAL DAILY
COMMUNITY

## 2023-06-08 RX ORDER — ASPIRIN 325 MG
325 TABLET, DELAYED RELEASE (ENTERIC COATED) ORAL EVERY 6 HOURS PRN
COMMUNITY

## 2023-06-08 RX ADMIN — SODIUM CHLORIDE, POTASSIUM CHLORIDE, SODIUM LACTATE AND CALCIUM CHLORIDE 1000 ML: 600; 310; 30; 20 INJECTION, SOLUTION INTRAVENOUS at 10:14

## 2023-06-08 RX ADMIN — CEFTRIAXONE SODIUM 1 G: 1 INJECTION, POWDER, FOR SOLUTION INTRAMUSCULAR; INTRAVENOUS at 10:54

## 2023-06-08 RX ADMIN — ONDANSETRON 4 MG: 2 INJECTION INTRAMUSCULAR; INTRAVENOUS at 10:15

## 2023-06-08 RX ADMIN — HYDROMORPHONE HYDROCHLORIDE 0.5 MG: 1 INJECTION, SOLUTION INTRAMUSCULAR; INTRAVENOUS; SUBCUTANEOUS at 10:16

## 2023-06-08 NOTE — Clinical Note
[facility] EMERGENCY DEPARTMENT  [address] [facility] [address]  Phone: [phone]    Barrett Arriaza was seen and treated in our emergency department on 6/8/2023.  He may return to work on 06/12/2023.         Thank you for choosing [facility].    Timothy Grajeda MD

## 2023-06-08 NOTE — ED PROVIDER NOTES
EMERGENCY DEPARTMENT ENCOUNTER    Room Number:  40/40  Date seen:  6/8/2023  PCP: Mahi Omer APRN  Historian: Patient      HPI:  Chief Complaint: Rectal pain fever and chills  A complete HPI/ROS/PMH/PSH/SH/FH are unobtainable due to: None  Context: Barrett Arriaza is a 57 y.o. male who presents to the ED c/o rectal pain fever and chills.  Patient states he had a prostate biopsy on Tuesday.  States everything went very well.  Patient states Tuesday evening he started had fevers and chills.  Did not get his antibiotics filled until yesterday.  Patient states he has had rectal pain as well as pain into both testicles.  Patient has had no vomiting or diarrhea.  No burning with urination.  Patient has not checked his temperature.  Did get started on his antibiotics finally            PAST MEDICAL HISTORY  Active Ambulatory Problems     Diagnosis Date Noted    Generalized abdominal pain 02/24/2022    Weight loss 02/24/2022    Family history of colon cancer in father 02/24/2022    Nausea 02/24/2022     Resolved Ambulatory Problems     Diagnosis Date Noted    No Resolved Ambulatory Problems     Past Medical History:   Diagnosis Date    Cervical spondylosis     H/O paranoid schizophrenia     History of glaucoma     History of testicular cancer     Rectal bleeding          PAST SURGICAL HISTORY  Past Surgical History:   Procedure Laterality Date    CHOLECYSTECTOMY      COLONOSCOPY N/A 4/19/2022    Procedure: FLEXIBLE SIGMOIDOSCOOPYY TO 45 CM;  Surgeon: Alejandra Oliver MD;  Location: [facility] ENDOSCOPY;  Service: Gastroenterology;  Laterality: N/A;  PRE: SCREENING  POST: INTERNAL AND EXTERNAL HEMORRHOIDS    COLONOSCOPY N/A 5/10/2022    Procedure: COLONOSCOPY into cecum and terminal ileum with cold snare and cold biopsy polypectomy;  Surgeon: Alejandra Oliver MD;  Location: [facility] ENDOSCOPY;  Service: Gastroenterology;  Laterality: N/A;  Pre op: Family history of colon cancer, Rectal Bleeding  Post op: Polyps,  Diverticulosis, Large Hemorrhoids    ENDOSCOPY N/A 4/19/2022    Procedure: ESOPHAGOGASTRODUODENOSCOPY WITH COLD BIOPSIES;  Surgeon: Alejandra Oliver MD;  Location: [facility] ENDOSCOPY;  Service: Gastroenterology;  Laterality: N/A;  PRE: ABD PAIN  POST: GASTRITIS, RETAINED FOOD    EYE ENUCLEATION Right     EYE SURGERY      HAND TENDON REPAIR Left     NECK SURGERY  2001    SCROTAL SURGERY           FAMILY HISTORY  Family History   Problem Relation Age of Onset    Colon cancer Father     Malig Hyperthermia Neg Hx          SOCIAL HISTORY  Social History     Socioeconomic History    Marital status:    Tobacco Use    Smoking status: Some Days     Packs/day: 1.00     Types: Cigarettes   Substance and Sexual Activity    Alcohol use: Yes     Alcohol/week: 3.0 standard drinks     Types: 3 Cans of beer per week    Drug use: Yes     Frequency: 3.0 times per week     Types: Marijuana     Comment: THREE TIMES A WEEK         ALLERGIES  Iodinated contrast media, Oxycodone-acetaminophen, Morphine, and Penicillins        REVIEW OF SYSTEMS  Review of Systems   Fevers chills rectal pain      PHYSICAL EXAM  ED Triage Vitals [06/08/23 0907]   Temp Heart Rate Resp BP SpO2   98.9 °F (37.2 °C) 98 18 130/80 98 %      Temp src Heart Rate Source Patient Position BP Location FiO2 (%)   Oral -- -- -- --       Physical Exam      GENERAL: no acute distress  HENT: nares patent  EYES: no scleral icterus  CV: regular rhythm, normal rate  RESPIRATORY: normal effort  ABDOMEN: soft.  Mild lower tenderness.  Exam with no hemorrhoid or abscess  MUSCULOSKELETAL: no deformity  NEURO: alert, moves all extremities, follows commands  PSYCH:  calm, cooperative  SKIN: warm, dry    Vital signs and nursing notes reviewed.          LAB RESULTS  Recent Results (from the past 24 hour(s))   Comprehensive Metabolic Panel    Collection Time: 06/08/23  9:34 AM    Specimen: Blood   Result Value Ref Range    Glucose 94 65 - 99 mg/dL    BUN 10 6 - 20 mg/dL    Creatinine  0.86 0.76 - 1.27 mg/dL    Sodium 138 136 - 145 mmol/L    Potassium 3.8 3.5 - 5.2 mmol/L    Chloride 104 98 - 107 mmol/L    CO2 26.0 22.0 - 29.0 mmol/L    Calcium 8.8 8.6 - 10.5 mg/dL    Total Protein 6.7 6.0 - 8.5 g/dL    Albumin 4.1 3.5 - 5.2 g/dL    ALT (SGPT) 28 1 - 41 U/L    AST (SGOT) 28 1 - 40 U/L    Alkaline Phosphatase 85 39 - 117 U/L    Total Bilirubin 0.4 0.0 - 1.2 mg/dL    Globulin 2.6 gm/dL    A/G Ratio 1.6 g/dL    BUN/Creatinine Ratio 11.6 7.0 - 25.0    Anion Gap 8.0 5.0 - 15.0 mmol/L    eGFR 102.3 >60.0 mL/min/1.73   Lactic Acid, Plasma    Collection Time: 06/08/23  9:34 AM    Specimen: Blood   Result Value Ref Range    Lactate 1.3 0.5 - 2.0 mmol/L   Procalcitonin    Collection Time: 06/08/23  9:34 AM    Specimen: Blood   Result Value Ref Range    Procalcitonin 0.03 0.00 - 0.25 ng/mL   CBC Auto Differential    Collection Time: 06/08/23  9:34 AM    Specimen: Blood   Result Value Ref Range    WBC 5.88 3.40 - 10.80 10*3/mm3    RBC 5.00 4.14 - 5.80 10*6/mm3    Hemoglobin 16.2 13.0 - 17.7 g/dL    Hematocrit 45.9 37.5 - 51.0 %    MCV 91.8 79.0 - 97.0 fL    MCH 32.4 26.6 - 33.0 pg    MCHC 35.3 31.5 - 35.7 g/dL    RDW 12.5 12.3 - 15.4 %    RDW-SD 41.8 37.0 - 54.0 fl    MPV 9.2 6.0 - 12.0 fL    Platelets 238 140 - 450 10*3/mm3    Neutrophil % 27.7 (L) 42.7 - 76.0 %    Lymphocyte % 49.7 (H) 19.6 - 45.3 %    Monocyte % 13.6 (H) 5.0 - 12.0 %    Eosinophil % 7.8 (H) 0.3 - 6.2 %    Basophil % 0.9 0.0 - 1.5 %    Immature Grans % 0.3 0.0 - 0.5 %    Neutrophils, Absolute 1.63 (L) 1.70 - 7.00 10*3/mm3    Lymphocytes, Absolute 2.92 0.70 - 3.10 10*3/mm3    Monocytes, Absolute 0.80 0.10 - 0.90 10*3/mm3    Eosinophils, Absolute 0.46 (H) 0.00 - 0.40 10*3/mm3    Basophils, Absolute 0.05 0.00 - 0.20 10*3/mm3    Immature Grans, Absolute 0.02 0.00 - 0.05 10*3/mm3    nRBC 0.0 0.0 - 0.2 /100 WBC   Urinalysis With Culture If Indicated - Urine, Clean Catch    Collection Time: 06/08/23 11:45 AM    Specimen: Urine, Clean Catch    Result Value Ref Range    Color, UA Yellow Yellow, Straw    Appearance, UA Clear Clear    pH, UA 5.5 5.0 - 8.0    Specific Gravity, UA 1.018 1.005 - 1.030    Glucose, UA Negative Negative    Ketones, UA Negative Negative    Bilirubin, UA Negative Negative    Blood, UA Large (3+) (A) Negative    Protein, UA Negative Negative    Leuk Esterase, UA Negative Negative    Nitrite, UA Negative Negative    Urobilinogen, UA 0.2 E.U./dL 0.2 - 1.0 E.U./dL   Urinalysis, Microscopic Only - Urine, Clean Catch    Collection Time: 06/08/23 11:45 AM    Specimen: Urine, Clean Catch   Result Value Ref Range    RBC, UA Too Numerous to Count (A) None Seen, 0-2 /HPF    WBC, UA 0-2 None Seen, 0-2 /HPF    Bacteria, UA None Seen None Seen /HPF    Squamous Epithelial Cells, UA 0-2 None Seen, 0-2 /HPF    Hyaline Casts, UA 0-2 None Seen /LPF    Methodology Automated Microscopy        Ordered the above labs and reviewed the results.        RADIOLOGY  CT Abdomen Pelvis Without Contrast    Result Date: 6/8/2023  EXAMINATION: CT OF THE ABDOMEN AND PELVIS WITHOUT CONTRAST  TECHNIQUE: Computed tomography of the abdomen and pelvis without contrast per protocol. Radiation dose reduction techniques were utilized, including automated exposure control and exposure modulation based on body size.  HISTORY: Abdominal and rectal pain  COMPARISON: 02/15/2022  FINDINGS: Limited evaluation of the inferior thorax demonstrates atelectasis, without consolidation, pleural effusion, or pneumothorax. The heart is normal in size, without pericardial effusion.  The gallbladder is absent. Old granulomatous disease is seen in the spleen. 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 There is stable minimal adrenal thickening, likely adenomatous. There is rectal thickening with surrounding stranding.  The liver, left kidney, pancreas, stomach, small bowel, appendix, urinary bladder, and abdominal vasculature are normal as evaluated on this noncontrast  examination. No intraperitoneal fluid collection or free gas are seen. No enlarged lymph nodes are demonstrated.  Bone windows demonstrate degenerative changes, without suspicious osseous lesion seen.      1. FINDINGS suggestive of proctitis. Consider direct visualization after resolution of symptoms of clinically indicated 2. Additional incidental findings as above, stable.  This report was finalized on 6/8/2023 10:14 AM by Dr. Eliseo Baugh M.D.       Ordered the above noted radiological studies.  CT scan independently interpreted by me and shows no evidence of obstruction          PROCEDURES  Procedures            MEDICATIONS GIVEN IN ER  Medications   lactated ringers bolus 1,000 mL (0 mL Intravenous Stopped 6/8/23 1137)   HYDROmorphone (DILAUDID) injection 0.5 mg (0.5 mg Intravenous Given 6/8/23 1016)   ondansetron (ZOFRAN) injection 4 mg (4 mg Intravenous Given 6/8/23 1015)   cefTRIAXone (ROCEPHIN) 1 g in sodium chloride 0.9 % 100 mL IVPB-VTB (0 g Intravenous Stopped 6/8/23 1137)                   MEDICAL DECISION MAKING, PROGRESS, and CONSULTS     Discussion below represents my analysis of pertinent findings related to patient's condition, differential diagnosis, treatment plan and final disposition.      Additional sources:  - Discussed/ obtained information from independent historians: None    - External (non-ED) record review: Epic reviewed and patient followed in GI here for diarrhea and abdominal pain 8/10/2022    - Chronic or social conditions impacting care: None    - Shared decision making: None      Orders placed during this visit:  Orders Placed This Encounter   Procedures    Blood Culture - Blood,    Blood Culture - Blood,    CT Abdomen Pelvis Without Contrast    Comprehensive Metabolic Panel    Urinalysis With Culture If Indicated - Urine, Clean Catch    Lactic Acid, Plasma    Procalcitonin    CBC Auto Differential    Urinalysis, Microscopic Only - Urine, Clean Catch    CBC & Differential          Additional orders considered but not ordered:  Considered admission but patient has no fever here.  Has normal blood work.        Differential diagnosis includes but is not limited to:    Proctitis versus prostatitis versus viral syndrome      Independent interpretation of labs, radiology studies, and discussions with consultants:  ED Course as of 06/08/23 1257   Thu Jun 08, 2023   1212 12:13 EDT  Patient presents for fevers and chills and rectal pain.  Patient appears to have proctitis.  No evidence of prostatitis.  Patient's blood work appears normal.  Discussed with Dr. Dockery.  Patient will be started on Cipro and Flagyl.  Will given small amount of pain medication.  He is to follow-up with Dr. Dockery and return for any concerns. [SL]      ED Course User Index  [SL] Timothy Grajeda MD                 DIAGNOSIS  Final diagnoses:   Proctitis         DISPOSITION  DISCHARGE    Patient discharged in stable condition.    Reviewed implications of results, diagnosis, meds, responsibility to follow up, warning signs and symptoms of possible worsening, potential complications and reasons to return to ER, including worsening symptoms    Patient/Family voiced understanding of above instructions.    Discussed plan for discharge, as there is no emergent indication for admission. Patient referred to primary care provider for BP management due to today's BP. Pt/family is agreeable and understands need for follow up and repeat testing.  Pt is aware that discharge does not mean that nothing is wrong but it indicates no emergency is present that requires admission and they must continue care with follow-up as given below or physician of their choice.     FOLLOW-UP  Miguel Dockery Jr., MD  [address] [facility] [address] [phone]    Schedule an appointment as soon as possible for a visit            Medication List        New Prescriptions      HYDROcodone-acetaminophen 5-325 MG per  tablet  Commonly known as: NORCO  Take 1 tablet by mouth Every 6 (Six) Hours As Needed for Moderate Pain.     metroNIDAZOLE 500 MG tablet  Commonly known as: FLAGYL  Take 1 tablet by mouth 2 (Two) Times a Day.               Where to Get Your Medications        These medications were sent to [id number] - [place], [place] - [address]  AT [facility] - [phone]  - [phone] FX [address] , [facility] [address]      Phone: [phone]   HYDROcodone-acetaminophen 5-325 MG per tablet  metroNIDAZOLE 500 MG tablet                  Latest Documented Vital Signs:  As of 12:57 EDT  BP- 143/98 HR- 57 Temp- 98.9 °F (37.2 °C) (Oral) O2 sat- 97%              --    Please note that portions of this were completed with a voice recognition program.       Note Disclaimer: At [facility], we believe that sharing information builds trust and better relationships. You are receiving this note because you are receiving care at [facility] or recently visited. It is possible you will see health information before a provider has talked with you about it. This kind of information can be easy to misunderstand. To help you fully understand what it means for your health, we urge you to discuss this note with your provider.            Timothy Grajeda MD  06/08/23 3376

## 2023-06-11 ENCOUNTER — APPOINTMENT (OUTPATIENT)
Dept: CT IMAGING | Facility: HOSPITAL | Age: 58
End: 2023-06-11
Payer: COMMERCIAL

## 2023-06-11 ENCOUNTER — HOSPITAL ENCOUNTER (EMERGENCY)
Facility: HOSPITAL | Age: 58
Discharge: HOME | End: 2023-06-11
Attending: EMERGENCY MEDICINE | Admitting: EMERGENCY MEDICINE
Payer: COMMERCIAL

## 2023-06-11 ENCOUNTER — APPOINTMENT (OUTPATIENT)
Dept: GENERAL RADIOLOGY | Facility: HOSPITAL | Age: 58
End: 2023-06-11
Payer: COMMERCIAL

## 2023-06-11 VITALS
RESPIRATION RATE: 18 BRPM | HEART RATE: 70 BPM | DIASTOLIC BLOOD PRESSURE: 73 MMHG | WEIGHT: 155 LBS | OXYGEN SATURATION: 94 % | HEIGHT: 68 IN | BODY MASS INDEX: 23.49 KG/M2 | SYSTOLIC BLOOD PRESSURE: 113 MMHG | TEMPERATURE: 98.1 F

## 2023-06-11 DIAGNOSIS — M54.6 ACUTE MIDLINE THORACIC BACK PAIN: ICD-10-CM

## 2023-06-11 DIAGNOSIS — S13.9XXA SPRAIN OF CERVICAL NECK, INITIAL ENCOUNTER: ICD-10-CM

## 2023-06-11 DIAGNOSIS — S09.90XA CLOSED HEAD INJURY WITHOUT LOSS OF CONSCIOUSNESS, INITIAL ENCOUNTER: Primary | ICD-10-CM

## 2023-06-11 PROCEDURE — 70450 CT HEAD/BRAIN W/O DYE: CPT

## 2023-06-11 PROCEDURE — 99283 EMERGENCY DEPT VISIT LOW MDM: CPT

## 2023-06-11 PROCEDURE — 72125 CT NECK SPINE W/O DYE: CPT

## 2023-06-11 PROCEDURE — 72072 X-RAY EXAM THORAC SPINE 3VWS: CPT

## 2023-06-11 RX ORDER — ACETAMINOPHEN 500 MG
1000 TABLET ORAL ONCE
Status: COMPLETED | OUTPATIENT
Start: 2023-06-11 | End: 2023-06-11

## 2023-06-11 RX ADMIN — ACETAMINOPHEN 1000 MG: 500 TABLET ORAL at 18:29
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
## 2023-06-13 LAB
BACTERIA SPEC AEROBE CULT: NORMAL
BACTERIA SPEC AEROBE CULT: NORMAL

## 2023-07-23 ENCOUNTER — HOSPITAL ENCOUNTER (EMERGENCY)
Facility: HOSPITAL | Age: 58
Discharge: HOME | End: 2023-07-23
Attending: EMERGENCY MEDICINE | Admitting: EMERGENCY MEDICINE
Payer: COMMERCIAL

## 2023-07-23 VITALS
HEART RATE: 96 BPM | RESPIRATION RATE: 16 BRPM | SYSTOLIC BLOOD PRESSURE: 119 MMHG | BODY MASS INDEX: 23.49 KG/M2 | DIASTOLIC BLOOD PRESSURE: 77 MMHG | HEIGHT: 68 IN | TEMPERATURE: 96.9 F | OXYGEN SATURATION: 98 % | WEIGHT: 155 LBS

## 2023-07-23 DIAGNOSIS — R21 RASH: Primary | ICD-10-CM

## 2023-07-23 PROCEDURE — 99282 EMERGENCY DEPT VISIT SF MDM: CPT

## 2023-07-23 RX ORDER — DIPHENHYDRAMINE HCL 25 MG
25 CAPSULE ORAL EVERY 6 HOURS PRN
Qty: 20 CAPSULE | Refills: 0 | Status: SHIPPED | OUTPATIENT
Start: 2023-07-23

## 2023-07-23 RX ORDER — METHYLPREDNISOLONE 4 MG/1
TABLET ORAL
Qty: 21 TABLET | Refills: 0 | Status: SHIPPED | OUTPATIENT
Start: 2023-07-23

## 2023-07-23 NOTE — ED PROVIDER NOTES
EMERGENCY DEPARTMENT ENCOUNTER  I wore full protective equipment throughout this patient encounter including a N95 mask, eye shield, gown and gloves. Hand hygiene was performed before donning protective equipment and after removal when leaving the room.    Room Number:  12/12  Date of encounter:  7/23/2023  PCP: Mahi Omer APRN  Patient Care Team:  Mahi Omer APRN as PCP - General (Nurse Practitioner)     HPI:  Context: Barrett Arriaza is a 57 y.o. male who presents to the ED c/o chief complaint of rash.  Patient reports it initially began a week ago, predominantly on his chest and his right upper arm, patient reports that he now has several scattered lesions on his legs.  Patient reports rash is itching, denies any pain.  Patient denies any lesions on palms or soles of his foot, no intraoral lesions, denies any swelling of his lips tongue or throat, no difficulty swallowing, no difficulty breathing, no fever shakes chills or night sweats.  Patient reports that he initially thought it was poison ivy, put calamine lotion on it without relief.    MEDICAL HISTORY REVIEW  Reviewed in EPIC    PAST MEDICAL HISTORY  Active Ambulatory Problems     Diagnosis Date Noted    Generalized abdominal pain 02/24/2022    Weight loss 02/24/2022    Family history of colon cancer in father 02/24/2022    Nausea 02/24/2022     Resolved Ambulatory Problems     Diagnosis Date Noted    No Resolved Ambulatory Problems     Past Medical History:   Diagnosis Date    Cervical spondylosis     H/O paranoid schizophrenia     History of glaucoma     History of testicular cancer     Rectal bleeding        PAST SURGICAL HISTORY  Past Surgical History:   Procedure Laterality Date    CHOLECYSTECTOMY      COLONOSCOPY N/A 4/19/2022    Procedure: FLEXIBLE SIGMOIDOSCOOPYY TO 45 CM;  Surgeon: Alejandra Oliver MD;  Location: [facility] ENDOSCOPY;  Service: Gastroenterology;  Laterality: N/A;  PRE: SCREENING  POST: INTERNAL AND EXTERNAL HEMORRHOIDS     COLONOSCOPY N/A 5/10/2022    Procedure: COLONOSCOPY into cecum and terminal ileum with cold snare and cold biopsy polypectomy;  Surgeon: Alejandra Oliver MD;  Location:  [facility];  Service: Gastroenterology;  Laterality: N/A;  Pre op: Family history of colon cancer, Rectal Bleeding  Post op: Polyps, Diverticulosis, Large Hemorrhoids    ENDOSCOPY N/A 4/19/2022    Procedure: ESOPHAGOGASTRODUODENOSCOPY WITH COLD BIOPSIES;  Surgeon: Alejandra Oliver MD;  Location:  [facility];  Service: Gastroenterology;  Laterality: N/A;  PRE: ABD PAIN  POST: GASTRITIS, RETAINED FOOD    EYE ENUCLEATION Right     EYE SURGERY      HAND TENDON REPAIR Left     NECK SURGERY  2001    SCROTAL SURGERY         FAMILY HISTORY  Family History   Problem Relation Age of Onset    Colon cancer Father     Malig Hyperthermia Neg Hx        SOCIAL HISTORY  Social History     Socioeconomic History    Marital status:    Tobacco Use    Smoking status: Some Days     Packs/day: 1.00     Types: Cigarettes   Substance and Sexual Activity    Alcohol use: Yes     Alcohol/week: 3.0 standard drinks     Types: 3 Cans of beer per week    Drug use: Yes     Frequency: 3.0 times per week     Types: Marijuana     Comment: THREE TIMES A WEEK       ALLERGIES  Iodinated contrast media, Oxycodone-acetaminophen, Morphine, and Penicillins    The patient's allergies have been reviewed    REVIEW OF SYSTEMS  All systems reviewed and negative except for those discussed in HPI.     PHYSICAL EXAM  I have reviewed the triage vital signs and nursing notes.  ED Triage Vitals [07/23/23 0933]   Temp Heart Rate Resp BP SpO2   96.9 °F (36.1 °C) 96 16 -- 98 %      Temp src Heart Rate Source Patient Position BP Location FiO2 (%)   Tympanic Monitor -- -- --       General: No acute distress.  HENT: NCAT, PERRL, Nares patent.  Eyes: no scleral icterus.  Neck: trachea midline, no ROM limitations.  CV: regular rhythm, regular rate.  Respiratory: normal effort, CTAB.  Abdomen:  soft, nondistended, NTTP, no rebound tenderness, no guarding or rigidity.  Musculoskeletal: no deformity.  Neuro: alert, moves all extremities, follows commands.  Skin: warm, dry.  Scattered maculopapular rash with multiple lesions on upper chest as well as right upper arm, several scattered lesions on bilateral distal inner thighs.  No involvement of the palms soles, no intraoral lesions.    LAB RESULTS  No results found for this or any previous visit (from the past 24 hour(s)).    I ordered the above labs and reviewed the results.    RADIOLOGY  No Radiology Exams Resulted Within Past 24 Hours    I ordered the above noted radiological studies. I reviewed the images and results. I agree with the radiologist interpretation.    PROCEDURES  Procedures    MEDICATIONS GIVEN IN ER  Medications - No data to display    PROGRESS, DATA ANALYSIS, CONSULTS, AND MEDICAL DECISION MAKING  A complete history and physical exam have been performed.  All available laboratory and imaging results have been reviewed by myself prior to disposition.    MDM    After the initial H&P, I discussed pertinent information from history and physical exam with patient/family.  Discussed differential diagnosis.  Discussed plan for ED evaluation/workup/treatment.  All questions answered.  Patient/family is agreeable with plan.  ED Course as of 07/23/23 0954   Sun Jul 23, 2023   0951 Patient has nondescript rash, no alarm signs or symptoms, patient is well-appearing.  Discussed plan to treat with steroids, antihistamines, discussed need for follow-up with primary care, discussed referral to dermatology, given extensive discussion return precautions, discharging. [JG]      ED Course User Index  [JG] Himanshu Diego MD       AS OF 09:54 EDT VITALS:    BP - 119/77  HR - 96  TEMP - 96.9 °F (36.1 °C) (Tympanic)  O2 SATS - 98%    DIAGNOSIS  Final diagnoses:   Rash     DISPOSITION  DISCHARGE    Patient discharged in stable condition.    Reviewed  implications of results, diagnosis, meds, responsibility to follow up, warning signs and symptoms of possible worsening, potential complications and reasons to return to ER.    Patient/Family voiced understanding of above instructions.    Discussed plan for discharge, as there is no emergent indication for admission. Patient referred to primary care provider for BP management due to today's BP. Pt/family is agreeable and understands need for follow up and repeat testing.  Pt is aware that discharge does not mean that nothing is wrong but it indicates no emergency is present that requires admission and they must continue care with follow-up as given below or physician of their choice.     FOLLOW-UP  Mahi Omer APRN  [address] [facility] [id number] [phone]    Schedule an appointment as soon as possible for a visit in 2 days      [facility]  [address] [place] [id number]  [phone]  Schedule an appointment as soon as possible for a visit in 2 days           Medication List        New Prescriptions      diphenhydrAMINE 25 mg capsule  Commonly known as: BENADRYL  Take 1 capsule by mouth Every 6 (Six) Hours As Needed for Itching.     methylPREDNISolone 4 MG dose pack  Commonly known as: MEDROL  Take as directed on package instructions.               Where to Get Your Medications        These medications were sent to [id number] - [place], [place] - [address]  AT [facility] - [phone]  - [phone]  [address] [facility] [address]      Phone: [phone]   diphenhydrAMINE 25 mg capsule  methylPREDNISolone 4 MG dose pack            Himanshu Diego MD  07/23/23 3935

## 2023-12-18 ENCOUNTER — OFFICE VISIT (OUTPATIENT)
Dept: INTERNAL MEDICINE | Facility: CLINIC | Age: 58
End: 2023-12-18
Payer: COMMERCIAL

## 2023-12-18 VITALS
HEART RATE: 78 BPM | WEIGHT: 164 LBS | BODY MASS INDEX: 24.86 KG/M2 | SYSTOLIC BLOOD PRESSURE: 126 MMHG | RESPIRATION RATE: 18 BRPM | DIASTOLIC BLOOD PRESSURE: 84 MMHG | OXYGEN SATURATION: 96 % | HEIGHT: 68 IN

## 2023-12-18 DIAGNOSIS — R10.84 GENERALIZED ABDOMINAL PAIN: ICD-10-CM

## 2023-12-18 DIAGNOSIS — Z76.89 ENCOUNTER TO ESTABLISH CARE: Primary | ICD-10-CM

## 2023-12-18 DIAGNOSIS — Z72.0 TOBACCO USE: ICD-10-CM

## 2023-12-18 DIAGNOSIS — K62.5 RECTAL BLEEDING: ICD-10-CM

## 2023-12-18 DIAGNOSIS — B35.1 ONYCHOMYCOSIS: ICD-10-CM

## 2023-12-18 DIAGNOSIS — F17.210 CIGARETTE NICOTINE DEPENDENCE WITHOUT COMPLICATION: ICD-10-CM

## 2023-12-18 PROBLEM — G47.33 OSA (OBSTRUCTIVE SLEEP APNEA): Status: ACTIVE | Noted: 2023-12-18

## 2023-12-18 PROBLEM — R11.0 NAUSEA: Status: RESOLVED | Noted: 2022-02-24 | Resolved: 2023-12-18

## 2023-12-18 PROBLEM — I82.402 DEEP VEIN THROMBOSIS (DVT) OF LEFT LOWER EXTREMITY: Status: ACTIVE | Noted: 2019-02-05

## 2023-12-18 PROBLEM — G89.29 CHRONIC PAIN: Status: ACTIVE | Noted: 2020-08-28

## 2023-12-18 PROBLEM — R06.83 SNORING: Status: ACTIVE | Noted: 2023-12-18

## 2023-12-18 PROBLEM — R19.7 DIARRHEA: Status: ACTIVE | Noted: 2017-08-04

## 2023-12-18 PROBLEM — C61 PROSTATE CANCER: Status: ACTIVE | Noted: 2023-12-18

## 2023-12-18 PROBLEM — Z80.0 FAMILY HISTORY OF COLON CANCER: Status: ACTIVE | Noted: 2017-08-07

## 2023-12-18 PROBLEM — F25.0 SCHIZOAFFECTIVE DISORDER, BIPOLAR TYPE: Status: ACTIVE | Noted: 2022-02-02

## 2023-12-18 PROBLEM — C18.9 COLON CANCER: Status: ACTIVE | Noted: 2023-12-18

## 2023-12-18 PROBLEM — F43.10 POSTTRAUMATIC STRESS DISORDER: Status: ACTIVE | Noted: 2021-06-08

## 2023-12-18 PROBLEM — R63.4 WEIGHT LOSS: Status: RESOLVED | Noted: 2022-02-24 | Resolved: 2023-12-18

## 2023-12-18 PROBLEM — I05.8 MITRAL VALVE MASS: Status: ACTIVE | Noted: 2020-08-26

## 2023-12-18 PROBLEM — I20.1 CORONARY ARTERY VASOSPASM: Status: ACTIVE | Noted: 2020-11-12

## 2023-12-18 PROBLEM — F19.10 SUBSTANCE ABUSE: Status: ACTIVE | Noted: 2020-11-11

## 2023-12-18 PROBLEM — F10.10 ALCOHOL ABUSE: Status: ACTIVE | Noted: 2020-09-30

## 2023-12-18 PROBLEM — Z91.041 CONTRAST MEDIA ALLERGY: Status: ACTIVE | Noted: 2019-02-05

## 2023-12-18 PROBLEM — M47.812 SPONDYLOSIS OF CERVICAL SPINE: Status: ACTIVE | Noted: 2020-08-22

## 2023-12-18 PROBLEM — C18.9 COLON CANCER: Status: RESOLVED | Noted: 2023-12-18 | Resolved: 2023-12-18

## 2023-12-18 LAB
ALBUMIN SERPL-MCNC: 4.2 G/DL (ref 3.5–5.2)
ALBUMIN/GLOB SERPL: 1.3 G/DL
ALP SERPL-CCNC: 102 U/L (ref 39–117)
ALT SERPL-CCNC: 34 U/L (ref 1–41)
AST SERPL-CCNC: 34 U/L (ref 1–40)
BASOPHILS # BLD AUTO: 0.04 10*3/MM3 (ref 0–0.2)
BASOPHILS NFR BLD AUTO: 0.8 % (ref 0–1.5)
BILIRUB SERPL-MCNC: 0.6 MG/DL (ref 0–1.2)
BUN SERPL-MCNC: 12 MG/DL (ref 6–20)
BUN/CREAT SERPL: 11.7 (ref 7–25)
CALCIUM SERPL-MCNC: 9.8 MG/DL (ref 8.6–10.5)
CHLORIDE SERPL-SCNC: 101 MMOL/L (ref 98–107)
CO2 SERPL-SCNC: 28.5 MMOL/L (ref 22–29)
CREAT SERPL-MCNC: 1.03 MG/DL (ref 0.76–1.27)
EGFRCR SERPLBLD CKD-EPI 2021: 85.3 ML/MIN/1.73
EOSINOPHIL # BLD AUTO: 0.67 10*3/MM3 (ref 0–0.4)
EOSINOPHIL NFR BLD AUTO: 13.7 % (ref 0.3–6.2)
ERYTHROCYTE [DISTWIDTH] IN BLOOD BY AUTOMATED COUNT: 13.1 % (ref 12.3–15.4)
GLOBULIN SER CALC-MCNC: 3.2 GM/DL
GLUCOSE SERPL-MCNC: 75 MG/DL (ref 65–99)
HCT VFR BLD AUTO: 48.4 % (ref 37.5–51)
HGB BLD-MCNC: 16.9 G/DL (ref 13–17.7)
IMM GRANULOCYTES # BLD AUTO: 0.02 10*3/MM3 (ref 0–0.05)
IMM GRANULOCYTES NFR BLD AUTO: 0.4 % (ref 0–0.5)
LYMPHOCYTES # BLD AUTO: 2.34 10*3/MM3 (ref 0.7–3.1)
LYMPHOCYTES NFR BLD AUTO: 48 % (ref 19.6–45.3)
MCH RBC QN AUTO: 32.9 PG (ref 26.6–33)
MCHC RBC AUTO-ENTMCNC: 34.9 G/DL (ref 31.5–35.7)
MCV RBC AUTO: 94.3 FL (ref 79–97)
MONOCYTES # BLD AUTO: 0.77 10*3/MM3 (ref 0.1–0.9)
MONOCYTES NFR BLD AUTO: 15.8 % (ref 5–12)
NEUTROPHILS # BLD AUTO: 1.04 10*3/MM3 (ref 1.7–7)
NEUTROPHILS NFR BLD AUTO: 21.3 % (ref 42.7–76)
NRBC BLD AUTO-RTO: 0.2 /100 WBC (ref 0–0.2)
PLATELET # BLD AUTO: 266 10*3/MM3 (ref 140–450)
POTASSIUM SERPL-SCNC: 5 MMOL/L (ref 3.5–5.2)
PROT SERPL-MCNC: 7.4 G/DL (ref 6–8.5)
RBC # BLD AUTO: 5.13 10*6/MM3 (ref 4.14–5.8)
SODIUM SERPL-SCNC: 137 MMOL/L (ref 136–145)
WBC # BLD AUTO: 4.88 10*3/MM3 (ref 3.4–10.8)

## 2023-12-18 RX ORDER — LACTOSE-REDUCED FOOD 0.06G-1/ML
240 LIQUID (ML) ORAL
COMMUNITY
Start: 2023-10-17

## 2023-12-18 NOTE — PROGRESS NOTES
"Chief Complaint  Establish Care, Pain (After using the bathroom ), and Cancer (Prostate cancer )    Subjective        Barrett Arriaza presents to [facility]  History of Present Illness  Patient seen today to establish care. He was diagnosed with \"stage 2 Prostate Cancer\" by Dr Metz (Urology) he is to have a follow up MRI on 12/4/23. Since diagnosis he is feeling very anxious and is seeing [place] for management. Saw Dr Hurtado previously and diagnosed with some \"colitis\" in the past. Was told to follow a bland GI diet but is noncompliant with diet, has not followed up with GI in over a year and no longer taking medication. Currently having rectal bleeding, pain, and diarrhea to soft stools intermittently. Denies constipation. Has history of colon cancer on fathers side. Patient denies anorexia, nausea, vomiting or fevers.   Cancer  This is a new problem. The current episode started more than 1 month ago. Associated symptoms include abdominal pain and fatigue. Pertinent negatives include no chest pain, congestion, coughing, fever or weakness. The symptoms are aggravated by smoking. The treatment provided no relief.   Rectal Bleeding  This is a chronic problem. The current episode started more than 1 month ago. The problem occurs every several days. Associated symptoms include abdominal pain and fatigue. Pertinent negatives include no chest pain, congestion, coughing, fever or weakness.   Rectal Pain  This is a chronic problem. The current episode started more than 1 month ago. The problem occurs every several days. The problem has been gradually worsening. Associated symptoms include abdominal pain and fatigue. Pertinent negatives include no chest pain, congestion, coughing, fever or weakness. The symptoms are aggravated by eating and stress. The treatment provided no relief.   Abdominal Pain  This is a chronic problem. The current episode started more than 1 year ago. The " "onset quality is undetermined. The problem occurs every several days. The problem has been gradually worsening. The pain is located in the RLQ and LLQ. The pain is at a severity of 9/10. The pain is severe. The quality of the pain is colicky and burning. The abdominal pain does not radiate. Associated symptoms include constipation, diarrhea and hematochezia. Pertinent negatives include no fever or weight loss. Prior diagnostic workup includes GI consult. His past medical history is significant for ulcerative colitis.       Objective   Vital Signs:  /84 (BP Location: Left arm, Patient Position: Sitting, Cuff Size: Small Adult)   Pulse 78   Resp 18   Ht 172.7 cm (68\")   Wt 74.4 kg (164 lb)   SpO2 96%   BMI 24.94 kg/m²   Estimated body mass index is 24.94 kg/m² as calculated from the following:    Height as of this encounter: 172.7 cm (68\").    Weight as of this encounter: 74.4 kg (164 lb).       BMI is within normal parameters. No other follow-up for BMI required.      Physical Exam  Vitals reviewed.   Constitutional:       Appearance: Normal appearance.   HENT:      Head: Normocephalic.      Mouth/Throat:      Mouth: Mucous membranes are moist.   Neck:      Vascular: No carotid bruit.   Cardiovascular:      Rate and Rhythm: Normal rate and regular rhythm.      Pulses: Normal pulses.           Dorsalis pedis pulses are 2+ on the right side.        Posterior tibial pulses are 2+ on the right side.      Heart sounds: Normal heart sounds.   Pulmonary:      Effort: Pulmonary effort is normal.      Breath sounds: No stridor, decreased air movement or transmitted upper airway sounds. Examination of the right-lower field reveals decreased breath sounds. Examination of the left-lower field reveals decreased breath sounds. Decreased breath sounds present.   Abdominal:      General: Abdomen is flat. Bowel sounds are normal.      Palpations: Abdomen is soft.      Tenderness: There is abdominal tenderness in the right " lower quadrant and left lower quadrant. There is no right CVA tenderness, left CVA tenderness, guarding or rebound.      Hernia: No hernia is present. There is no hernia in the umbilical area.   Musculoskeletal:      Right lower leg: No edema.      Left lower leg: No edema.   Feet:      Right foot:      Skin integrity: Dry skin present. No skin breakdown.      Toenail Condition: Right toenails are abnormally thick. Fungal disease present.  Lymphadenopathy:      Cervical: No cervical adenopathy.      Right cervical: No superficial, deep or posterior cervical adenopathy.     Left cervical: No superficial, deep or posterior cervical adenopathy.   Skin:     General: Skin is warm and dry.      Capillary Refill: Capillary refill takes less than 2 seconds.      Nails: There is clubbing.   Neurological:      General: No focal deficit present.      Mental Status: He is alert.      Gait: Gait is intact.   Psychiatric:         Attention and Perception: Attention and perception normal.         Mood and Affect: Mood is anxious.         Speech: Speech normal.         Behavior: Behavior normal. Behavior is cooperative.         Cognition and Memory: Cognition normal.        Result Review :                   Assessment and Plan   Diagnoses and all orders for this visit:    1. Encounter to establish care (Primary)    2. Generalized abdominal pain  -     Ambulatory Referral to Gastroenterology  -     CBC & Differential  -     Comprehensive metabolic panel    3. Tobacco use  -     Discontinue: nicotine polacrilex (NICORETTE) gum 2 mg  -     nicotine polacrilex (COMMIT) 4 MG lozenge; Dissolve 1 lozenge in the mouth As Needed for Smoking Cessation for up to 30 days.  Dispense: 90 lozenge; Refill: 6    4. Cigarette nicotine dependence without complication    5. Rectal bleeding  -     CBC & Differential  -     Comprehensive metabolic panel    6. Onychomycosis  -     Ambulatory Referral to Podiatry      Patient has history of rectal  bleeding, had seen Dr. Hurtado in the past but she is no longer with the group, referral placed to establish care for GI needs. CBC and CMP drawn today to monitor for bleeding. Patient given information of bland GI diet and to reduce consumption of alcohol. Nicotine replacement ordered and advised to stop smoking and techniques to stop smoking reviewed with patient and wife.   Patient wanting flu and Covid Vaccinations today, advised to complete at [facility] pharmacy post visit.        Follow Up   Return in about 3 months (around 3/18/2024), or if symptoms worsen or fail to improve.  Patient was given instructions and counseling regarding his condition or for health maintenance advice. Please see specific information pulled into the AVS if appropriate.

## 2023-12-19 ENCOUNTER — OFFICE VISIT (OUTPATIENT)
Dept: GASTROENTEROLOGY | Facility: CLINIC | Age: 58
End: 2023-12-19
Payer: COMMERCIAL

## 2023-12-19 VITALS
DIASTOLIC BLOOD PRESSURE: 78 MMHG | HEART RATE: 75 BPM | BODY MASS INDEX: 24.92 KG/M2 | TEMPERATURE: 98.3 F | WEIGHT: 164.4 LBS | SYSTOLIC BLOOD PRESSURE: 114 MMHG | HEIGHT: 68 IN

## 2023-12-19 DIAGNOSIS — K62.5 RECTAL BLEEDING: Primary | ICD-10-CM

## 2023-12-19 DIAGNOSIS — K58.0 IRRITABLE BOWEL SYNDROME WITH DIARRHEA: ICD-10-CM

## 2023-12-19 DIAGNOSIS — K64.8 INTERNAL HEMORRHOIDS: ICD-10-CM

## 2023-12-19 PROCEDURE — 99213 OFFICE O/P EST LOW 20 MIN: CPT | Performed by: INTERNAL MEDICINE

## 2023-12-19 PROCEDURE — 1160F RVW MEDS BY RX/DR IN RCRD: CPT | Performed by: INTERNAL MEDICINE

## 2023-12-19 PROCEDURE — 1159F MED LIST DOCD IN RCRD: CPT | Performed by: INTERNAL MEDICINE

## 2023-12-19 RX ORDER — NORTRIPTYLINE HYDROCHLORIDE 10 MG/1
10 CAPSULE ORAL NIGHTLY
Qty: 30 CAPSULE | Refills: 11 | Status: SHIPPED | OUTPATIENT
Start: 2023-12-19

## 2023-12-19 RX ORDER — DIPHENHYDRAMINE HCL 25 MG
1 CAPSULE ORAL EVERY 6 HOURS PRN
COMMUNITY
Start: 2023-07-23

## 2023-12-19 NOTE — PROGRESS NOTES
Chief Complaint   Patient presents with   • Abdominal Pain       Barrett Arriaza is a  58 y.o. male here for a follow up visit for IBS-D.    HPI    Patient 58-year-old male with history of prostate cancer, rectal bleeding, schizophrenia who presents in follow-up with chronic lower abdominal pain postprandial urgency and diarrhea.  Patient reports nausea has improved since last visit but ongoing concern and discomfort with his abdomen.  Multiple CTs have been done.  Patient reports a history of colitis though no evident colitis seen on sigmoidoscopy or colonoscopy done here.  Findings consistent with IBS here for further recommendations.    Past Medical History:   Diagnosis Date   • Cervical spondylosis    • H/O paranoid schizophrenia    • History of glaucoma    • History of testicular cancer    • Rectal bleeding          Current Outpatient Medications:   •  aspirin 325 MG EC tablet, Take 1 tablet by mouth Every 6 (Six) Hours As Needed for Mild Pain., Disp: , Rfl:   •  butalbital-acetaminophen-caffeine (ORBIVAN) -40 MG capsule capsule, Take  by mouth., Disp: , Rfl:   •  COVID-19 mRNA Vac-Sophia,Pfizer, (Comirnaty) 30 MCG/0.3ML suspension, Inject  into the appropriate muscle as directed by prescriber., Disp: 0.3 mL, Rfl: 0  •  diphenhydrAMINE (BENADRYL) 25 mg capsule, Take 1 capsule by mouth Every 6 (Six) Hours As Needed., Disp: , Rfl:   •  influenza vac split quad (Fluzone Quadrivalent) 0.5 ML suspension prefilled syringe injection, Inject  into the appropriate muscle as directed by prescriber., Disp: 0.5 mL, Rfl: 0  •  nicotine polacrilex (COMMIT) 4 MG lozenge, Dissolve 1 lozenge in the mouth As Needed for Smoking Cessation for up to 30 days., Disp: 90 lozenge, Rfl: 6  •  Nutritional Supplements (Boost High Protein) liquid, Take 240 mL by mouth., Disp: , Rfl:   •  QUEtiapine XR (SEROquel XR) 400 MG 24 hr tablet, Take 1 tablet by mouth Every Night., Disp: , Rfl:   •  nortriptyline (Pamelor) 10 MG capsule, Take  1 capsule by mouth Every Night., Disp: 30 capsule, Rfl: 11    Allergies   Allergen Reactions   • Oxycodone Hives     States he can take Vicodin   • Penicillins Hives   • Iodinated Contrast Media Itching   • Oxycodone-Acetaminophen Hives     Per pt can take hydroCODONE    • Morphine Swelling       Social History     Socioeconomic History   • Marital status: Single   Tobacco Use   • Smoking status: Some Days     Packs/day: 1     Types: Cigarettes   Vaping Use   • Vaping Use: Never used   Substance and Sexual Activity   • Alcohol use: Yes     Alcohol/week: 3.0 standard drinks of alcohol     Types: 3 Cans of beer per week   • Drug use: Yes     Frequency: 3.0 times per week     Types: Marijuana     Comment: THREE TIMES A WEEK       Family History   Problem Relation Age of Onset   • Colon cancer Father    • Malig Hyperthermia Neg Hx        Review of Systems   Constitutional: Negative.    Respiratory: Negative.     Cardiovascular: Negative.    Gastrointestinal:  Positive for abdominal pain, anal bleeding, diarrhea and rectal pain. Negative for abdominal distention, constipation, nausea and vomiting.   Musculoskeletal: Negative.    Skin: Negative.    Hematological: Negative.      Vitals:    12/19/23 0926   BP: 114/78   Pulse: 75   Temp: 98.3 °F (36.8 °C)       Physical Exam  Vitals reviewed.   Constitutional:       Appearance: Normal appearance. He is well-developed and normal weight.   HENT:      Head: Normocephalic and atraumatic.      Mouth/Throat:      Mouth: Mucous membranes are moist.   Eyes:      General: No scleral icterus.     Pupils: Pupils are equal, round, and reactive to light.   Pulmonary:      Effort: Pulmonary effort is normal. No respiratory distress.      Breath sounds: Normal breath sounds.   Abdominal:      General: Bowel sounds are normal. There is no distension.      Palpations: Abdomen is soft.      Tenderness: There is no abdominal tenderness.   Skin:     General: Skin is warm and dry.       Coloration: Skin is not jaundiced.      Findings: No rash.   Neurological:      General: No focal deficit present.      Mental Status: He is alert and oriented to person, place, and time.      Cranial Nerves: No cranial nerve deficit.   Psychiatric:         Mood and Affect: Mood normal.         Behavior: Behavior normal.         Thought Content: Thought content normal.     Office Visit on 12/18/2023   Component Date Value Ref Range Status   • WBC 12/18/2023 4.88  3.40 - 10.80 10*3/mm3 Final   • RBC 12/18/2023 5.13  4.14 - 5.80 10*6/mm3 Final   • Hemoglobin 12/18/2023 16.9  13.0 - 17.7 g/dL Final   • Hematocrit 12/18/2023 48.4  37.5 - 51.0 % Final   • MCV 12/18/2023 94.3  79.0 - 97.0 fL Final   • MCH 12/18/2023 32.9  26.6 - 33.0 pg Final   • MCHC 12/18/2023 34.9  31.5 - 35.7 g/dL Final   • RDW 12/18/2023 13.1  12.3 - 15.4 % Final   • Platelets 12/18/2023 266  140 - 450 10*3/mm3 Final   • Neutrophil Rel % 12/18/2023 21.3 (L)  42.7 - 76.0 % Final   • Lymphocyte Rel % 12/18/2023 48.0 (H)  19.6 - 45.3 % Final   • Monocyte Rel % 12/18/2023 15.8 (H)  5.0 - 12.0 % Final   • Eosinophil Rel % 12/18/2023 13.7 (H)  0.3 - 6.2 % Final   • Basophil Rel % 12/18/2023 0.8  0.0 - 1.5 % Final   • Neutrophils Absolute 12/18/2023 1.04 (L)  1.70 - 7.00 10*3/mm3 Final   • Lymphocytes Absolute 12/18/2023 2.34  0.70 - 3.10 10*3/mm3 Final   • Monocytes Absolute 12/18/2023 0.77  0.10 - 0.90 10*3/mm3 Final   • Eosinophils Absolute 12/18/2023 0.67 (H)  0.00 - 0.40 10*3/mm3 Final   • Basophils Absolute 12/18/2023 0.04  0.00 - 0.20 10*3/mm3 Final   • Immature Granulocyte Rel % 12/18/2023 0.4  0.0 - 0.5 % Final   • Immature Grans Absolute 12/18/2023 0.02  0.00 - 0.05 10*3/mm3 Final   • nRBC 12/18/2023 0.2  0.0 - 0.2 /100 WBC Final   • Glucose 12/18/2023 75  65 - 99 mg/dL Final   • BUN 12/18/2023 12  6 - 20 mg/dL Final   • Creatinine 12/18/2023 1.03  0.76 - 1.27 mg/dL Final   • EGFR Result 12/18/2023 85.3  >60.0 mL/min/1.73 Final   • BUN/Creatinine  Ratio 12/18/2023 11.7  7.0 - 25.0 Final   • Sodium 12/18/2023 137  136 - 145 mmol/L Final   • Potassium 12/18/2023 5.0  3.5 - 5.2 mmol/L Final   • Chloride 12/18/2023 101  98 - 107 mmol/L Final   • Total CO2 12/18/2023 28.5  22.0 - 29.0 mmol/L Final   • Calcium 12/18/2023 9.8  8.6 - 10.5 mg/dL Final   • Total Protein 12/18/2023 7.4  6.0 - 8.5 g/dL Final   • Albumin 12/18/2023 4.2  3.5 - 5.2 g/dL Final   • Globulin 12/18/2023 3.2  gm/dL Final   • A/G Ratio 12/18/2023 1.3  g/dL Final   • Total Bilirubin 12/18/2023 0.6  0.0 - 1.2 mg/dL Final   • Alkaline Phosphatase 12/18/2023 102  39 - 117 U/L Final   • AST (SGOT) 12/18/2023 34  1 - 40 U/L Final   • ALT (SGPT) 12/18/2023 34  1 - 41 U/L Final       Diagnoses and all orders for this visit:    1. Rectal bleeding (Primary)  -     Ambulatory Referral to Colorectal Surgery    2. Internal hemorrhoids  -     Ambulatory Referral to Colorectal Surgery    3. Irritable bowel syndrome with diarrhea    Other orders  -     nortriptyline (Pamelor) 10 MG capsule; Take 1 capsule by mouth Every Night.  Dispense: 30 capsule; Refill: 11    Patient 58-year-old male with history of schizophrenia, glaucoma testicular cancer with chronic abdominal pain rectal bleeding intermittently with postprandial diarrhea and urgency presenting for follow-up.  Patient nonresponsive to Bentyl with ongoing symptoms requesting hemorrhoid treatment as well as improvement in his urgency and bleeding.  Will begin nortriptyline 10 mg at bedtime for his IBS symptoms as well as request consultation with Dr. Gilmore of colorectal surgery to take care of his hemorrhoidal treatment.  Patient has yet to be treated for his stage II prostate cancer status post biopsy in June.  Will follow-up clinically based on response to the tricyclic's for further recommendations.

## 2023-12-20 NOTE — PROGRESS NOTES
Please call the patient regarding his abnormal result.  Hemoglobin and Hematocrit are stable meaning blood loss is minimal. Other lab values are more than likely due to inflammation of the GI tract. Per notes from Dr. Celis GI continue with Nortriptyline medication regimen at bedtime and follow up with Dr. Gilmore (colorectal surgery).

## 2023-12-21 ENCOUNTER — HOSPITAL ENCOUNTER (OUTPATIENT)
Dept: MRI IMAGING | Facility: HOSPITAL | Age: 58
Discharge: HOME | End: 2023-12-21
Admitting: UROLOGY
Payer: COMMERCIAL

## 2023-12-21 ENCOUNTER — PATIENT ROUNDING (BHMG ONLY) (OUTPATIENT)
Dept: INTERNAL MEDICINE | Facility: CLINIC | Age: 58
End: 2023-12-21
Payer: COMMERCIAL

## 2023-12-21 DIAGNOSIS — C61 PROSTATE CANCER: ICD-10-CM

## 2023-12-21 PROBLEM — F33.1 RECURRENT MAJOR DEPRESSIVE EPISODES, MODERATE: Status: ACTIVE | Noted: 2023-12-21

## 2023-12-21 PROBLEM — R55 SYNCOPE AND COLLAPSE: Status: ACTIVE | Noted: 2019-02-05

## 2023-12-21 PROBLEM — G47.19 EXCESSIVE DAYTIME SLEEPINESS: Status: ACTIVE | Noted: 2023-12-21

## 2023-12-21 PROBLEM — R07.2 PRECORDIAL PAIN: Status: ACTIVE | Noted: 2018-06-16

## 2023-12-21 PROBLEM — F33.9 RECURRENT MAJOR DEPRESSION: Status: ACTIVE | Noted: 2023-12-21

## 2023-12-21 PROBLEM — Z09 HOSPITAL DISCHARGE FOLLOW-UP: Status: ACTIVE | Noted: 2023-03-09

## 2023-12-21 PROBLEM — R10.9 ABDOMINAL PAIN: Status: ACTIVE | Noted: 2019-02-05

## 2023-12-21 PROBLEM — F10.930 ALCOHOL WITHDRAWAL SYNDROME WITHOUT COMPLICATION: Status: ACTIVE | Noted: 2018-06-16

## 2023-12-21 PROBLEM — F10.21 ALCOHOL USE DISORDER, SEVERE, IN EARLY REMISSION: Chronic | Status: ACTIVE | Noted: 2023-03-09

## 2023-12-21 PROBLEM — F43.12 CHRONIC POST-TRAUMATIC STRESS DISORDER: Status: ACTIVE | Noted: 2021-06-08

## 2023-12-21 PROBLEM — M54.9 RIGHT-SIDED BACK PAIN: Status: ACTIVE | Noted: 2020-08-22

## 2023-12-21 PROBLEM — F20.0 SCHIZOPHRENIA, PARANOID: Status: ACTIVE | Noted: 2018-06-16

## 2023-12-21 PROBLEM — R06.89 GASPING FOR BREATH: Status: ACTIVE | Noted: 2023-12-21

## 2023-12-21 PROCEDURE — 72197 MRI PELVIS W/O & W/DYE: CPT

## 2023-12-21 PROCEDURE — 0 GADOBENATE DIMEGLUMINE 529 MG/ML SOLUTION: Performed by: UROLOGY

## 2023-12-21 PROCEDURE — 82565 ASSAY OF CREATININE: CPT

## 2023-12-21 PROCEDURE — A9577 INJ MULTIHANCE: HCPCS | Performed by: UROLOGY

## 2023-12-21 RX ADMIN — GADOBENATE DIMEGLUMINE 15 ML: 529 INJECTION, SOLUTION INTRAVENOUS at 22:28

## 2023-12-22 ENCOUNTER — OFFICE VISIT (OUTPATIENT)
Dept: SURGERY | Facility: CLINIC | Age: 58
End: 2023-12-22
Payer: COMMERCIAL

## 2023-12-22 VITALS
SYSTOLIC BLOOD PRESSURE: 112 MMHG | HEIGHT: 68 IN | HEART RATE: 89 BPM | OXYGEN SATURATION: 98 % | WEIGHT: 166 LBS | BODY MASS INDEX: 25.16 KG/M2 | DIASTOLIC BLOOD PRESSURE: 78 MMHG

## 2023-12-22 DIAGNOSIS — K64.8 INTERNAL HEMORRHOIDS: Primary | ICD-10-CM

## 2023-12-22 LAB — CREAT BLDA-MCNC: 1 MG/DL (ref 0.6–1.3)

## 2023-12-22 RX ORDER — HYDROCORTISONE ACETATE 25 MG/1
25 SUPPOSITORY RECTAL EVERY 12 HOURS
Qty: 14 SUPPOSITORY | Refills: 0 | Status: SHIPPED | OUTPATIENT
Start: 2023-12-22 | End: 2023-12-29

## 2023-12-22 RX ORDER — SODIUM CHLORIDE 9 MG/ML
40 INJECTION, SOLUTION INTRAVENOUS AS NEEDED
OUTPATIENT
Start: 2023-12-22

## 2023-12-22 RX ORDER — SODIUM CHLORIDE 0.9 % (FLUSH) 0.9 %
3 SYRINGE (ML) INJECTION EVERY 12 HOURS SCHEDULED
OUTPATIENT
Start: 2023-12-22

## 2023-12-22 RX ORDER — SODIUM CHLORIDE 0.9 % (FLUSH) 0.9 %
3-10 SYRINGE (ML) INJECTION AS NEEDED
OUTPATIENT
Start: 2023-12-22

## 2023-12-22 NOTE — PROGRESS NOTES
Barrett Arriaza is a 58 y.o. male who is seen as a consult at the request of Rusty Celis MD for Rectal Pain.      HPI:    Pt presents today for evaluation of hemorrhoids.   Experiences frequent episodes of rectal swelling, pain, and prolapsing tissue.  Tissue prolapses after a BM and requires manual reduction.    Used Anusol-HC 25 mg suppositories in the past, but currently using OTC suppositories and PrepH.   Does not like to use PrepH as it causes a burning sensation.   Has a donut pillow, which he uses during flare-ups.   Pt states he had a procedure performed on his hemorrhoids ~15 years ago, but is unsure of the details regarding this.     Pt has a Hx of IBS-D for which he is followed by GI.   Pt treated with Bentyl in the past without symptomatic improvement.   Was last seen by Dr. Celis 2023 and started on Nortriptyline 10 mg QHS.  Has 4-5 BMs daily.  [place]: Alternates between 1 & 6  Occasional straining if stool is hard.   S/P cholecystectomy .    Pt with Hx of prostate cancer and is followed by Dr. Dockery.   Was seen at the [facility] ED 2023 for rectal pain and fever following a prostate biopsy.   CT of abdomen/pelvis with rectal wall thickening suggestive of proctitis.   He was treated outpatient with Cipro and Flagyl.      Pt denies taking any ASA or anticoagulation.     Last colonoscopy on 05/10/2022.  He was noted to have a TA x1, a hyperplastic polyp x1, and sigmoid diverticulosis.   Recall 5 years.     Father had a Hx of colon cancer and is now .   Pt unsure of age of father when Dx.   Cousin with Crohn's disease.       Past Medical History:   Diagnosis Date    Alcohol use disorder, severe, in early remission 2023    Stable Length (duration) of remission: 30 DAYS In the event of cravings or relapse, resource reviewed: Alcoholics Anonymous https://www.aa.org/ Follow up Provider: PCP, Medication Assisted Treatment (MAT) Program and Other Follow up in: One month     Alcohol withdrawal syndrome without complication 06/16/2018    Allergic rhinitis     Asbestos exposure     Atypical chest pain 06/14/2021    SEEN AT [facility]    Bulging lumbar disc 07/15/2020    SEEN AT [place]    Carpal tunnel syndrome of right wrist 03/26/2022    SEEN AT [facility]    Cervical radiculopathy 08/2021    Cervical spondylosis     Chest pain 11/11/2020    SEEN AT [facility]    Chronic pain 08/28/2020    Chronic post-traumatic stress disorder 06/08/2021    Closed head injury without loss of consciousness 06/11/2023    SEEN AT [facility]    Cocaine abuse 01/01/1995    Colitis 09/12/2016    SEEN AT [facility]    Colon polyps     FOLLOWED BY DR. LAILA JEAN-BAPTISTE    Contrast media allergy 02/05/2019    Coronary artery vasospasm 11/12/2020    DDD (degenerative disc disease), lumbar     Deep vein thrombosis (DVT) of left lower extremity 02/05/2019    Elevated liver enzymes 10/2021    Excessive daytime sleepiness 12/21/2023    Folliculitis 12/2022    Glaucoma     Hallucinations 10/26/2021    SEEN AT [facility]    Heat exhaustion 07/11/2018    SEEN AT [facility]    Hematoma 12/29/2019    SEEN AT [facility]    Hemorrhoids 04/11/2016    SEEN AT [facility]    Herniated lumbar intervertebral disc 08/2020    Irritable bowel syndrome with diarrhea 12/19/2023    Left varicocele 02/04/2019    SEEN AT [facility]    Lumbar paraspinal muscle spasm 07/2020    Malnutrition 10/2023    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 GARCIA    Rectal bleeding 02/15/2022    SEEN AT [facility]    Right sided weakness 08/22/2020    SEEN AT [place]    Schizoaffective disorder, bipolar type 02/02/2022    Condition: stable Last mental health visit 3/8/23 [facility] Medications: Taking medications as prescribed If taking  medications, do not stop treatment without consulting healthcare provider. If symptoms worsen or do not improve/stabilize, notify health care provider right away. If thoughts of harming s    Snoring 12/18/2023    Sprain of cervical neck 06/2023    Strain of lumbar region 07/2020    Syncope and collapse 02/05/2019    Testicular cancer        Past Surgical History:   Procedure Laterality Date    ANTERIOR CERVICAL DISCECTOMY W/ FUSION N/A 01/04/2021    C4-5, DR. DUANE DENSLER AT [place]    CARDIAC CATHETERIZATION Left 10/02/2020    DR. RICO MARADIAGA AT [place]    CHOLECYSTECTOMY N/A 02/13/2015    LAPAROSCOPIC WITH IOC, DR. CARLOS CONCEPCION AT [place]    COLONOSCOPY N/A 04/19/2022    FLEXIBLE SIGMOIDOSCOOPYY TO 45 CM,LARGE HEMORRHOIDS, SOLID STOOL IN SIGMOID, RESCOPE IN 6 MONTHS, DR. LAILA JEAN-BAPTISTE AT [facility]    COLONOSCOPY N/A 05/10/2022    5 MM TUBULAR ADENOMA POLYP IN DESCENDING, 4 MM BENIGN POLYP IN SIGMOID, MEDIUM DIVERTICULA IN SIGMOID, LARGE HEMORRHOIDS, DR. LAILA JEAN-BAPTISTE AT [facility]    ENDOSCOPY N/A 04/19/2022    MEDIUM AMOUNT OF FOOD IN GASTRIC ANTRUM, GASTRITIS, DR. LAILA JEAN-BAPTISTE AT [facility]    EYE ENUCLEATION Right     HAND TENDON REPAIR Left 09/2019    AT [facility]    NECK SURGERY N/A 2001    [facility]OTAL SURGERY      TRANSESOPHAGEAL ECHOCARDIOGRAM (RAFFAELE) N/A 08/28/2020    DR. NEETA CRUZ AT [place]       Social History:   reports that he has been smoking cigarettes. He started smoking about 45 years ago. He has been smoking an average of 1 pack per day. He has been exposed to tobacco smoke. He has never used smokeless tobacco. He reports current alcohol use of about 3.0 standard drinks of alcohol per week. He reports current drug use. Frequency: 3.00 times per week. Drug: Marijuana.      Marriage status: Single    Family History   Problem Relation Age of Onset    Hypertension Mother     Asthma Mother     Diabetes Mother     Sleep apnea Mother     Cancer Father     Colon cancer Father     Diabetes Sister     Sleep apnea Sister      Hypertension Brother     Asthma Brother     Sleep apnea Brother     Malig Hyperthermia Neg Hx          Current Outpatient Medications:     butalbital-acetaminophen-caffeine (ORBIVAN) -40 MG capsule capsule, Take  by mouth., Disp: , Rfl:     diphenhydrAMINE (BENADRYL) 25 mg capsule, Take 1 capsule by mouth Every 6 (Six) Hours As Needed., Disp: , Rfl:     nortriptyline (Pamelor) 10 MG capsule, Take 1 capsule by mouth Every Night., Disp: 30 capsule, Rfl: 11    Nutritional Supplements (Boost High Protein) liquid, Take 240 mL by mouth., Disp: , Rfl:     QUEtiapine XR (SEROquel XR) 400 MG 24 hr tablet, Take 1 tablet by mouth Every Night., Disp: , Rfl:     aspirin 325 MG EC tablet, Take 1 tablet by mouth Every 6 (Six) Hours As Needed for Mild Pain. (Patient not taking: Reported on 12/22/2023), Disp: , Rfl:     COVID-19 mRNA Vac-Sophia,Pfizer, (Comirnaty) 30 MCG/0.3ML suspension, Inject  into the appropriate muscle as directed by prescriber. (Patient not taking: Reported on 12/22/2023), Disp: 0.3 mL, Rfl: 0    hydrocortisone (ANUSOL-HC) 25 MG suppository, Insert 1 suppository into the rectum Every 12 (Twelve) Hours for 7 days., Disp: 14 suppository, Rfl: 0    influenza vac split quad (Fluzone Quadrivalent) 0.5 ML suspension prefilled syringe injection, Inject  into the appropriate muscle as directed by prescriber. (Patient not taking: Reported on 12/22/2023), Disp: 0.5 mL, Rfl: 0    nicotine polacrilex (COMMIT) 4 MG lozenge, Dissolve 1 lozenge in the mouth As Needed for Smoking Cessation for up to 30 days. (Patient not taking: Reported on 12/22/2023), Disp: 90 lozenge, Rfl: 6  No current facility-administered medications for this visit.    Allergy  Oxycodone, Penicillins, Iodinated contrast media, Morphine, and Oxycodone-acetaminophen    Review of Systems   Musculoskeletal:  Positive for back pain, muscle weakness, neck pain and stiffness.   Gastrointestinal:  Positive for constipation, diarrhea, dysphagia, hematochezia  and hemorrhoids.       Vitals:    12/22/23 0914   BP: 112/78   Pulse: 89   SpO2: 98%     Body mass index is 25.24 kg/m².    Physical Exam  Exam conducted with a chaperone present.   Constitutional:       General: He is not in acute distress.     Appearance: He is well-developed.   HENT:      Head: Normocephalic and atraumatic.      Nose: Nose normal.   Eyes:      Conjunctiva/sclera: Conjunctivae normal.      Pupils: Pupils are equal, round, and reactive to light.   Neck:      Trachea: No tracheal deviation.   Pulmonary:      Effort: Pulmonary effort is normal. No respiratory distress.      Breath sounds: Normal breath sounds.   Abdominal:      General: Bowel sounds are normal. There is no distension.      Palpations: Abdomen is soft.   Genitourinary:     Comments: Perianal exam: WNL  ALPHONSE- Good tone, no masses  Anoscopy performed:  Grade II-III x 3 internal hemorrhoids.   Musculoskeletal:         General: No deformity. Normal range of motion.      Cervical back: Normal range of motion.   Skin:     General: Skin is warm and dry.   Neurological:      Mental Status: He is alert and oriented to person, place, and time.      Cranial Nerves: No cranial nerve deficit.      Coordination: Coordination normal.      Gait: Gait normal.   Psychiatric:         Behavior: Behavior normal.         Judgment: Judgment normal.         Review of Medical Record:  I reviewed medical records as detailed in HPI.     Colonoscopy 05/10/2022:  - 5 mm Tubular Adenoma, Descending Colon  - 4 mm Hyperplastic Polyp, Sigmoid Colon  - Diverticulosis, Sigmoid Colon  - Non-Bleeding Internal Hemorrhoids  - Recall: 5 Years  - [facility]    FHx:  - Father: Hx of colon cancer     Assessment:  1. Internal hemorrhoids    - New    Plan:  - Discussed common causes of hemorrhoid irritation and enlargement.   - Encouraged good bowel habits.   - Rx for Anusol-HC 25 mg suppositories provided. Use PRN for hemorrhoidal symptoms.   - Recommended RBL of the  internal hemorrhoids. Pt requesting to be sedated for the procedure as he does not believe he would tolerate it otherwise. Discussed ligation of the internal hemorrhoids under general anesthesia. Procedure, recovery time, as well as risks and benefits discussed. Pt expresses understanding and wishes to proceed. Will tentatively schedule surgery. Pt to follow up with Dr. Gilmore prior to procedure.

## 2024-01-07 ENCOUNTER — HOSPITAL ENCOUNTER (EMERGENCY)
Facility: HOSPITAL | Age: 59
Discharge: HOME | End: 2024-01-07
Attending: STUDENT IN AN ORGANIZED HEALTH CARE EDUCATION/TRAINING PROGRAM | Admitting: STUDENT IN AN ORGANIZED HEALTH CARE EDUCATION/TRAINING PROGRAM
Payer: COMMERCIAL

## 2024-01-07 VITALS
HEIGHT: 68 IN | OXYGEN SATURATION: 100 % | TEMPERATURE: 97 F | SYSTOLIC BLOOD PRESSURE: 122 MMHG | DIASTOLIC BLOOD PRESSURE: 92 MMHG | WEIGHT: 167 LBS | RESPIRATION RATE: 16 BRPM | BODY MASS INDEX: 25.31 KG/M2 | HEART RATE: 91 BPM

## 2024-01-07 DIAGNOSIS — K64.9 HEMORRHOIDS, UNSPECIFIED HEMORRHOID TYPE: Primary | ICD-10-CM

## 2024-01-07 PROCEDURE — 99283 EMERGENCY DEPT VISIT LOW MDM: CPT

## 2024-01-07 RX ORDER — HYDROCODONE BITARTRATE AND ACETAMINOPHEN 5; 325 MG/1; MG/1
1 TABLET ORAL EVERY 6 HOURS PRN
Status: DISCONTINUED | OUTPATIENT
Start: 2024-01-07 | End: 2024-01-07 | Stop reason: HOSPADM

## 2024-01-07 RX ORDER — HYDROCODONE BITARTRATE AND ACETAMINOPHEN 5; 325 MG/1; MG/1
1 TABLET ORAL EVERY 6 HOURS PRN
Qty: 10 TABLET | Refills: 0 | Status: SHIPPED | OUTPATIENT
Start: 2024-01-07

## 2024-01-07 RX ADMIN — HYDROCODONE BITARTRATE AND ACETAMINOPHEN 1 TABLET: 5; 325 TABLET ORAL at 11:44

## 2024-01-07 NOTE — DISCHARGE INSTRUCTIONS
Please follow-up with your primary care physician as needed    Please call Dr. Gilmore's office to schedule follow-up and additional management of your pain    Please return to the ER with new or worsening symptoms including but not limited to uncontrolled pain, nausea, vomiting, fevers, chills

## 2024-01-07 NOTE — ED PROVIDER NOTES
EMERGENCY DEPARTMENT ENCOUNTER    Room Number:  21/21  PCP: Coni Laureano APRN  Historian: Patient      HPI:  Chief Complaint: Rectal pain  Context: Barrett Arriaza is a 58 y.o. male who presents to the ED c/o rectal pain.  Patient has known internal hemorrhoids and is scheduled for hemorrhoidectomy in approximately 1 month.  Patient follows with Mei Gilmore of colorectal surgery.  Patient was prescribed Anusol but has been unable to get this filled secondary to insurance complication.  Patient states pain is worse with any bears down for bowel movement and has become nearly intolerable.  Patient does have significant allergies to narcotic medications.  Patient is having difficulty managing his pain at home using over-the-counter suppositories.  Patient states he occasionally has a small amount of bright red blood in his stool.            PAST MEDICAL HISTORY  Active Ambulatory Problems     Diagnosis Date Noted    Abdominal pain 02/05/2019    Weight loss 02/24/2022    Family history of colon cancer in father 02/24/2022    Nausea 02/24/2022    Prostate cancer 12/18/2023    Onychomycosis 12/18/2023    Alcohol abuse 09/30/2020    Cannabis abuse 01/01/1982    Chronic pain 08/28/2020    Diarrhea 08/04/2017    Deep vein thrombosis (DVT) of left lower extremity 02/05/2019    Coronary artery vasospasm 11/12/2020    Contrast media allergy 02/05/2019    Chronic post-traumatic stress disorder 06/08/2021    Rectal bleeding 08/22/2020    Schizoaffective disorder, bipolar type 02/02/2022    Spondylosis of cervical spine 08/22/2020    Tobacco abuse 02/05/2019    Family history of colon cancer 08/07/2017    Substance abuse 11/11/2020    Snoring 12/18/2023    TYRON (obstructive sleep apnea) 12/18/2023    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    Acute cholecystitis 02/13/2015    Irritable bowel syndrome with diarrhea 12/19/2023    Internal hemorrhoids 12/19/2023    Precordial pain 06/16/2018    Right-sided back pain 08/22/2020     Cocaine abuse 01/01/1995    Alcohol use disorder, severe, in early remission 03/09/2023    Syncope and collapse 02/05/2019    Schizophrenia, paranoid 06/16/2018    Recurrent major depressive episodes, moderate 12/21/2023    Recurrent major depression 12/21/2023    Hospital discharge follow-up 03/09/2023    Gasping for breath 12/21/2023    Excessive daytime sleepiness 12/21/2023    Body mass index (BMI) of 24.0-24.9 in adult 03/09/2023    Alcohol withdrawal syndrome without complication 06/16/2018     Resolved Ambulatory Problems     Diagnosis Date Noted    Colon cancer 12/18/2023     Past Medical History:   Diagnosis Date    Allergic rhinitis     Asbestos exposure     Atypical chest pain 06/14/2021    Bulging lumbar disc 07/15/2020    Carpal tunnel syndrome of right wrist 03/26/2022    Cervical radiculopathy 08/2021    Cervical spondylosis     Chest pain 11/11/2020    Closed head injury without loss of consciousness 06/11/2023    Colitis 09/12/2016    Colon polyps     DDD (degenerative disc disease), lumbar     Elevated liver enzymes 10/2021    Folliculitis 12/2022    Glaucoma     Hallucinations 10/26/2021    Heat exhaustion 07/11/2018    Hematoma 12/29/2019    Hemorrhoids 04/11/2016    Herniated lumbar intervertebral disc 08/2020    Left varicocele 02/04/2019    Lumbar paraspinal muscle spasm 07/2020    Malnutrition 10/2023    Perianal dermatitis 12/2022    Presence of artificial eye     Proctitis 06/08/2023    Right sided weakness 08/22/2020    Sprain of cervical neck 06/2023    Strain of lumbar region 07/2020    Testicular cancer          PAST SURGICAL HISTORY  Past Surgical History:   Procedure Laterality Date    ANTERIOR CERVICAL DISCECTOMY W/ FUSION N/A 01/04/2021    C4-5, DR. DUANE DENSLER AT [facility]    CARDIAC CATHETERIZATION Left 10/02/2020    DR. RICO MARADIAGA AT [facility]    CHOLECYSTECTOMY N/A 02/13/2015    LAPAROSCOPIC WITH IOC, DR. CARLOS CONCEPCION AT [facility]    COLONOSCOPY N/A 04/19/2022    FLEXIBLE  SIGMOIDOSCOOPYY TO 45 CM,LARGE HEMORRHOIDS, SOLID STOOL IN SIGMOID, RESCOPE IN 6 MONTHS, DR. LAILA JEAN-BAPTISTE AT [facility]    COLONOSCOPY N/A 05/10/2022    5 MM TUBULAR ADENOMA POLYP IN DESCENDING, 4 MM BENIGN POLYP IN SIGMOID, MEDIUM DIVERTICULA IN SIGMOID, LARGE HEMORRHOIDS, DR. LAILA JEAN-BAPTISTE AT [facility]    ENDOSCOPY N/A 04/19/2022    MEDIUM AMOUNT OF FOOD IN GASTRIC ANTRUM, GASTRITIS, DR. LAILA JEAN-BAPTISTE AT [facility]    EYE ENUCLEATION Right     HAND TENDON REPAIR Left 09/2019    AT [facility]    NECK SURGERY N/A 2001    [facility]      TRANSESOPHAGEAL ECHOCARDIOGRAM (RAFFAELE) N/A 08/28/2020    DR. NEETA CRUZ AT [facility]         FAMILY HISTORY  Family History   Problem Relation Age of Onset    Hypertension Mother     Asthma Mother     Diabetes Mother     Sleep apnea Mother     Cancer Father     Colon cancer Father     Diabetes Sister     Sleep apnea Sister     Hypertension Brother     Asthma Brother     Sleep apnea Brother     Malig Hyperthermia Neg Hx          SOCIAL HISTORY  Social History     Socioeconomic History    Marital status: Single   Tobacco Use    Smoking status: Some Days     Packs/day: 1     Types: Cigarettes     Start date: 1979     Passive exposure: Current    Smokeless tobacco: Never   Vaping Use    Vaping Use: Never used   Substance and Sexual Activity    Alcohol use: Yes     Alcohol/week: 3.0 standard drinks of alcohol     Types: 3 Cans of beer per week     Comment: HALF PINT OF WHISKEY/ADDISON DAILY    Drug use: Yes     Frequency: 3.0 times per week     Types: Marijuana     Comment: THREE TIMES A WEEK    Sexual activity: Defer         ALLERGIES  Oxycodone, Penicillins, Iodinated contrast media, Morphine, and Oxycodone-acetaminophen        REVIEW OF SYSTEMS  Review of Systems   Gastrointestinal:  Positive for anal bleeding and rectal pain.   All other systems reviewed and are negative.           PHYSICAL EXAM  ED Triage Vitals [01/07/24 1101]   Temp Heart Rate Resp BP SpO2   97 °F (36.1 °C) 91 16 -- 100 %      Temp src  Heart Rate Source Patient Position BP Location FiO2 (%)   -- Monitor -- -- --       Physical Exam      GENERAL: no acute distress  HENT: nares patent  EYES: no scleral icterus  CV: regular rhythm, normal rate  RESPIRATORY: normal effort  ABDOMEN: soft  MUSCULOSKELETAL: no deformity  NEURO: alert, moves all extremities, follows commands  PSYCH:  calm, cooperative  SKIN: warm, dry  Rectal: External rectal exam is overall unremarkable with no evidence of anal fissure or external hemorrhoids    Vital signs and nursing notes reviewed.          LAB RESULTS  No results found for this or any previous visit (from the past 24 hour(s)).    Ordered the above labs and reviewed the results.        RADIOLOGY  No Radiology Exams Resulted Within Past 24 Hours    Ordered the above noted radiological studies. Reviewed by me in PACS.          MEDICATIONS GIVEN IN ER  Medications   HYDROcodone-acetaminophen (NORCO) 5-325 MG per tablet 1 tablet (1 tablet Oral Given 1/7/24 1144)                   MEDICAL DECISION MAKING, PROGRESS, and CONSULTS    All labs have been independently reviewed by me.  All radiology studies have been reviewed by me and I have also reviewed the radiology report.   EKG's independently viewed and interpreted by me.  Discussion below represents my analysis of pertinent findings related to patient's condition, differential diagnosis, treatment plan and final disposition.      Additional sources:  - Discussed/ obtained information from independent historians: Family member at bedside    - External (non-ED) record review: Office visit with colorectal surgery from 12/22/2023 reviewed and notable for planned hemorrhoidectomy in 1 month secondary to internal hemorrhoids    - Chronic or social conditions impacting care: Known internal hemorrhoids    - Shared decision making: Utilizing shared decision making we will proceed with outpatient management at this time      Orders placed during this visit:  No orders of the  defined types were placed in this encounter.      Differential diagnosis includes but is not limited to:    Anal fissure, thrombosed hemorrhoid, internal hemorrhoid      Independent interpretation of labs, radiology studies, and discussions with consultants:  ED Course as of 01/07/24 1153   Sun Jan 07, 2024   1117 Mike reviewed and negative [MW]   1150 Patient presents for rectal pain that is worsened by bowel movements.  Patient with known internal hemorrhoids and planned hemorrhoidectomy.  Patient has been unable to treat appropriately due to insurance complications and obtaining Anusol prescription.  Physical exam is overall unremarkable with no external hemorrhoids, thrombosed hemorrhoids or anal fissures appreciated.  Will treat with dose of Norco which patient has previously tolerated and prescribe a couple days worth of [place] until patient can follow-up with colorectal surgery for more definitive management.  Supportive care measures and return precautions discussed and patient discharged in stable condition. [MW]      ED Course User Index  [MW] Saud Edwards MD         DIAGNOSIS  Final diagnoses:   Hemorrhoids, unspecified hemorrhoid type         DISPOSITION  DISCHARGE    Patient discharged in stable condition.    Reviewed implications of results, diagnosis, meds, responsibility to follow up, warning signs and symptoms of possible worsening, potential complications and reasons to return to ER, including uncontrolled pain, nausea, vomiting, fevers, chills.    Patient/Family voiced understanding of above instructions.    Discussed plan for discharge, as there is no emergent indication for admission. Patient referred to primary care provider for BP management due to today's BP. Pt/family is agreeable and understands need for follow up and repeat testing.  Pt is aware that discharge does not mean that nothing is wrong but it indicates no emergency is present that requires admission and they must continue  care with follow-up as given below or physician of their choice.     FOLLOW-UP  Coni Laureano APRN  [address]  [address]  [facility] [id number] [phone]          Edna Gilmore MD  [address]    [facility] [address]  [phone]    In 3 days           Medication List        New Prescriptions      HYDROcodone-acetaminophen 5-325 MG per tablet  Commonly known as: NORCO  Take 1 tablet by mouth Every 6 (Six) Hours As Needed for Moderate Pain.               Where to Get Your Medications        These medications were sent to [id number] - [place], [place] - [address]  AT [facility] - [phone]  - [phone] FX [address] , [facility] [address]      Phone: [phone]   HYDROcodone-acetaminophen 5-325 MG per tablet                   Latest Documented Vital Signs:  As of 11:53 EST  BP- 122/92 HR- 91 Temp- 97 °F (36.1 °C) O2 sat- 100%      SUSANNE reviewed by Saud Edwards MD       --    Please note that portions of this were completed with a voice recognition program.       Note Disclaimer: At [facility], we believe that sharing information builds trust and better relationships. You are receiving this note because you are receiving care at [facility] or recently visited. It is possible you will see health information before a provider has talked with you about it. This kind of information can be easy to misunderstand. To help you fully understand what it means for your health, we urge you to discuss this note with your provider.             Saud Edwards MD  01/07/24 3785

## 2024-01-09 DIAGNOSIS — K64.9 HEMORRHOIDS, UNSPECIFIED HEMORRHOID TYPE: ICD-10-CM

## 2024-01-09 RX ORDER — HYDROCODONE BITARTRATE AND ACETAMINOPHEN 5; 325 MG/1; MG/1
1 TABLET ORAL EVERY 6 HOURS PRN
Qty: 10 TABLET | Refills: 0 | OUTPATIENT
Start: 2024-01-09

## 2024-01-09 NOTE — TELEPHONE ENCOUNTER
Caller: Barrett Arriaza    Relationship to patient: Self    Best call back number: [phone]    Patient is needing:  ANOTHER PROVIDER WROTE A PRESCRIPTION FOR     HYDROcodone-acetaminophen (NORCO) 5-325 MG per tablet     PATIENT STATES PHARMACY ADVISED HIM THEY COULD NOT FILL PRESCRIPTION WITHOUT AUTHORIZATION FROM PRIMARY CARE PROVIDER

## 2024-01-25 ENCOUNTER — TELEPHONE (OUTPATIENT)
Dept: SURGERY | Facility: CLINIC | Age: 59
End: 2024-01-25
Payer: COMMERCIAL

## 2024-01-25 ENCOUNTER — DOCUMENTATION (OUTPATIENT)
Dept: INTERNAL MEDICINE | Facility: CLINIC | Age: 59
End: 2024-01-25
Payer: COMMERCIAL

## 2024-01-25 RX ORDER — HYDROCORTISONE 25 MG/G
CREAM TOPICAL
Qty: 30 G | Refills: 1 | Status: SHIPPED | OUTPATIENT
Start: 2024-01-25

## 2024-01-25 NOTE — TELEPHONE ENCOUNTER
Patient requesting we call in a cream for his painful hemorrhoids to pharmacy listed in chart-having surgery 2/8/24 but needs some relief before then.

## 2024-01-25 NOTE — PROGRESS NOTES
"Patient presented to the office today asking to speak with myself, he asked the front first and was asked to make an appointment. He was refusing to speak to staff and requesting to speak to myself, I was unavailable at that time with patients. MA offered for him to have a seat and wait he responded \"Im done with Yarsani\" and left the office. Patient left the premises, attempted to call patient and his cell is not taking calls at this time.    "

## 2024-01-26 ENCOUNTER — TELEPHONE (OUTPATIENT)
Dept: INTERNAL MEDICINE | Facility: CLINIC | Age: 59
End: 2024-01-26
Payer: COMMERCIAL

## 2024-01-26 NOTE — TELEPHONE ENCOUNTER
"Patient came into office 1/25/24 to request to speak with Jose Alberto and refused to give me any information because we were in the waiting room. Advised patient that Jose Alberto is seeing patients and that he will need to schedule an appointment to discuss any medical issues    Patient got upset and stormed out of office saying \"he is done with [facility]\"  "

## 2024-02-02 ENCOUNTER — PRE-ADMISSION TESTING (OUTPATIENT)
Dept: PREADMISSION TESTING | Facility: HOSPITAL | Age: 59
End: 2024-02-02
Payer: COMMERCIAL

## 2024-02-02 ENCOUNTER — OFFICE VISIT (OUTPATIENT)
Dept: SURGERY | Facility: CLINIC | Age: 59
End: 2024-02-02
Payer: COMMERCIAL

## 2024-02-02 VITALS
WEIGHT: 165 LBS | BODY MASS INDEX: 25.9 KG/M2 | OXYGEN SATURATION: 97 % | SYSTOLIC BLOOD PRESSURE: 105 MMHG | HEIGHT: 67 IN | TEMPERATURE: 97.9 F | HEART RATE: 79 BPM | RESPIRATION RATE: 20 BRPM | DIASTOLIC BLOOD PRESSURE: 68 MMHG

## 2024-02-02 VITALS
WEIGHT: 168 LBS | OXYGEN SATURATION: 100 % | SYSTOLIC BLOOD PRESSURE: 120 MMHG | BODY MASS INDEX: 25.46 KG/M2 | HEIGHT: 68 IN | TEMPERATURE: 98 F | DIASTOLIC BLOOD PRESSURE: 80 MMHG

## 2024-02-02 DIAGNOSIS — K64.8 INTERNAL HEMORRHOIDS: ICD-10-CM

## 2024-02-02 DIAGNOSIS — K64.8 INTERNAL HEMORRHOIDS: Primary | ICD-10-CM

## 2024-02-02 LAB
ANION GAP SERPL CALCULATED.3IONS-SCNC: 9 MMOL/L (ref 5–15)
BUN SERPL-MCNC: 11 MG/DL (ref 6–20)
BUN/CREAT SERPL: 11.6 (ref 7–25)
CALCIUM SPEC-SCNC: 9.5 MG/DL (ref 8.6–10.5)
CHLORIDE SERPL-SCNC: 103 MMOL/L (ref 98–107)
CO2 SERPL-SCNC: 28 MMOL/L (ref 22–29)
CREAT SERPL-MCNC: 0.95 MG/DL (ref 0.76–1.27)
DEPRECATED RDW RBC AUTO: 43.1 FL (ref 37–54)
EGFRCR SERPLBLD CKD-EPI 2021: 93.9 ML/MIN/1.73
ERYTHROCYTE [DISTWIDTH] IN BLOOD BY AUTOMATED COUNT: 12.5 % (ref 12.3–15.4)
GLUCOSE SERPL-MCNC: 94 MG/DL (ref 65–99)
HCT VFR BLD AUTO: 47 % (ref 37.5–51)
HGB BLD-MCNC: 16 G/DL (ref 13–17.7)
MCH RBC QN AUTO: 31.7 PG (ref 26.6–33)
MCHC RBC AUTO-ENTMCNC: 34 G/DL (ref 31.5–35.7)
MCV RBC AUTO: 93.1 FL (ref 79–97)
PLATELET # BLD AUTO: 271 10*3/MM3 (ref 140–450)
PMV BLD AUTO: 9.1 FL (ref 6–12)
POTASSIUM SERPL-SCNC: 4 MMOL/L (ref 3.5–5.2)
QT INTERVAL: 386 MS
QTC INTERVAL: 405 MS
RBC # BLD AUTO: 5.05 10*6/MM3 (ref 4.14–5.8)
SODIUM SERPL-SCNC: 140 MMOL/L (ref 136–145)
WBC NRBC COR # BLD AUTO: 5.7 10*3/MM3 (ref 3.4–10.8)

## 2024-02-02 PROCEDURE — 93010 ELECTROCARDIOGRAM REPORT: CPT | Performed by: INTERNAL MEDICINE

## 2024-02-02 PROCEDURE — 85027 COMPLETE CBC AUTOMATED: CPT

## 2024-02-02 PROCEDURE — 93005 ELECTROCARDIOGRAM TRACING: CPT

## 2024-02-02 PROCEDURE — 80048 BASIC METABOLIC PNL TOTAL CA: CPT

## 2024-02-02 PROCEDURE — 36415 COLL VENOUS BLD VENIPUNCTURE: CPT

## 2024-02-02 RX ORDER — CITALOPRAM 40 MG/1
40 TABLET ORAL DAILY
COMMUNITY
Start: 2023-12-26

## 2024-02-02 RX ORDER — ASPIRIN 81 MG/1
81 TABLET ORAL DAILY
COMMUNITY

## 2024-02-02 NOTE — DISCHARGE INSTRUCTIONS
Take the following medications the morning of surgery:  CELEXA    ARRIVAL TIME 0630 ON 2/8/24      If you are on prescription narcotic pain medication to control your pain you may also take that medication the morning of surgery.    General Instructions:    Follow your surgeons instructions regarding when to stop solid foods and when to stop liquids.   Verify with your surgeon if you are to complete a bowel prep and when to do so.  Patients who avoid smoking, chewing tobacco and alcohol for 4 weeks prior to surgery have a reduced risk of post-operative complications.  Quit smoking as many days before surgery as you can.  Do not smoke, use chewing tobacco or drink alcohol the day of surgery.   If applicable bring your C-PAP/ BI-PAP machine in with you to preop day of surgery.  Bring any papers given to you in the doctor’s office.  Wear clean comfortable clothes.  Do not wear contact lenses, false eyelashes or make-up.  Bring a case for your glasses.   Bring crutches or walker if applicable.  Remove all piercings.  Leave jewelry and any other valuables at home.  Hair extensions with metal clips must be removed prior to surgery.  The Pre-Admission Testing nurse will instruct you to bring medications if unable to obtain an accurate list in Pre-Admission Testing.        If you were given a blood bank ID arm band remember to bring it with you the day of surgery.    Preventing a Surgical Site Infection:  For 2 to 3 days before surgery, avoid shaving with a razor because the razor can irritate skin and make it easier to develop an infection.    Any areas of open skin can increase the risk of a post-operative wound infection by allowing bacteria to enter and travel throughout the body.  Notify your surgeon if you have any skin wounds / rashes even if it is not near the expected surgical site.  The area will need assessed to determine if surgery should be delayed until it is healed.  The night prior to surgery shower using a  fresh bar of anti-bacterial soap (such as Dial) and clean washcloth.  Sleep in a clean bed with clean clothing.  Do not allow pets to sleep with you.  Shower on the morning of surgery using a fresh bar of anti-bacterial soap (such as Dial) and clean washcloth.  Dry with a clean towel and dress in clean clothing.  Ask your surgeon if you will be receiving antibiotics prior to surgery.  Make sure you, your family, and all healthcare providers clean their hands with soap and water or an alcohol based hand  before caring for you or your wound.    Day of surgery:  Your arrival time is approximately two hours before your scheduled surgery time.  Upon arrival, a Pre-op nurse and Anesthesiologist will review your health history, obtain vital signs, and answer questions you may have.  The only belongings needed at this time will be a list of your home medications and if applicable your C-PAP/BI-PAP machine.  A Pre-op nurse will start an IV and you may receive medication in preparation for surgery, including something to help you relax.     Please be aware that surgery does come with discomfort.  We want to make every effort to control your discomfort so please discuss any uncontrolled symptoms with your nurse.   Your doctor will most likely have prescribed pain medications.      If you are going home after surgery you will receive individualized written care instructions before being discharged.  A responsible adult must drive you to and from the hospital on the day of your surgery and stay with you for 24 hours.  Discharge prescriptions can be filled by the hospital pharmacy during regular pharmacy hours.  If you are having surgery late in the day/evening your prescription may be e-prescribed to your pharmacy.  Please verify your pharmacy hours or chose a 24 hour pharmacy to avoid not having access to your prescription because your pharmacy has closed for the day.    If you are staying overnight following surgery,  you will be transported to your hospital room following the recovery period.  [facility] has [facility] private rooms.    If you have any questions please call Pre-Admission Testing at [phone].  Deductibles and co-payments are collected on the day of service. Please be prepared to pay the required co-pay, deductible or deposit on the day of service as defined by your plan.    Call your surgeon immediately if you experience any of the following symptoms:  Sore Throat  Shortness of Breath or difficulty breathing  Cough  Chills  Body soreness or muscle pain  Headache  Fever  New loss of taste or smell  Do not arrive for your surgery ill.  Your procedure will need to be rescheduled to another time.  You will need to call your physician before the day of surgery to avoid any unnecessary exposure to hospital staff as well as other patients.

## 2024-02-02 NOTE — PROGRESS NOTES
"Barrett Arriaza is a 58 y.o. male in for follow up of Internal hemorrhoids    The patient has been seen by Windy Young PA-C, in the office. He underwent colonoscopies approximately 1 year ago that showed some large hemorrhoids and a polyp. It appears he has had some past substance abuse.    The patient is scheduled for ligation of internal hemorrhoids on 02/08/2024. He reports experiencing severe pain. He presented to [facility] emergency room due to severe pain associated with his internal hemorrhoids. He was prescribed Norco 10 tablets, and he has been taking \"pieces\" of the tablets. He continues to experience pain while sitting. His insurance would not cover suppositories. He has been consuming beans frequently to avoid straining when passing a bowel movement. He was prescribed hydrocortisone on 01/25/2024.    The patient has a history of schizophrenia. He takes Seroquel and Celexa. He is scheduled to see his psychiatrist on 02/02/2024. He notes that he has cancer, and he does not have regular follow-up with health care providers.  Past Medical History:   Diagnosis Date    Alcohol use disorder, severe, in early remission 03/09/2023    Stable Length (duration) of remission: 30 DAYS In the event of cravings or relapse, resource reviewed: Alcoholics Anonymous https://www.aa.org/ Follow up Provider: PCP, Medication Assisted Treatment (MAT) Program and Other Follow up in: One month    Alcohol withdrawal syndrome without complication 06/16/2018    Asbestos exposure     Carpal tunnel syndrome of right wrist 03/26/2022    SEEN AT [facility] ER    Cervical radiculopathy 08/2021    Cervical spondylosis     Chest pain 11/11/2020    CARDIAC CATH DONE [facility]  - NORMAL    Chronic pain 08/28/2020    Chronic post-traumatic stress disorder 06/08/2021    Closed head injury without loss of consciousness 06/11/2023    SEEN AT [facility]    Cocaine abuse 01/01/1995    Colitis 09/12/2016    SEEN AT [facility]    Colon polyps     FOLLOWED BY " DR. LAILA JEAN-BAPTISTE    Coronary artery vasospasm 11/12/2020    DDD (degenerative disc disease), lumbar     Deep vein thrombosis (DVT) of left lower extremity 02/05/2019    Elevated liver enzymes 10/2021    Excessive daytime sleepiness 12/21/2023    Glaucoma     Hallucinations 10/26/2021    SEEN AT [place] [facility]    Hemorrhoids     Herniated lumbar intervertebral disc 08/2020    History of testicular cancer     Irritable bowel syndrome with diarrhea 12/19/2023    Left varicocele 02/04/2019    SEEN AT [facility]    Lumbar paraspinal muscle spasm 07/2020    Malnutrition 10/2023    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 GARCIA    Rectal bleeding 02/15/2022    SEEN AT [facility] ER    Schizoaffective disorder, bipolar type 02/02/2022    Condition: stable Last mental health visit 3/8/23 [facility] Medications: Taking medications as prescribed If taking medications, do not stop treatment without consulting healthcare provider. If symptoms worsen or do not improve/stabilize, notify health care provider right away. If thoughts of harming s    Snoring 12/18/2023    Sprain of cervical neck 06/2023    Syncope and collapse 02/05/2019     Past Surgical History:   Procedure Laterality Date    ANTERIOR CERVICAL DISCECTOMY W/ FUSION N/A 01/04/2021    C4-5, DR. DUANE DENSLER AT [place]    CARDIAC CATHETERIZATION Left 10/02/2020    DR. RICO MARADIAGA AT [place]    CHOLECYSTECTOMY N/A 02/13/2015    LAPAROSCOPIC WITH IOC, DR. CARLOS CONCEPCION AT [place]    COLONOSCOPY N/A 04/19/2022    FLEXIBLE SIGMOIDOSCOOPYY TO 45 CM,LARGE HEMORRHOIDS, SOLID STOOL IN SIGMOID, RESCOPE IN 6 MONTHS, DR. LAILA JEAN-BAPTISTE AT [facility]    COLONOSCOPY N/A 05/10/2022    5 MM TUBULAR ADENOMA POLYP IN DESCENDING, 4 MM BENIGN POLYP IN SIGMOID, MEDIUM DIVERTICULA IN SIGMOID,  "LARGE HEMORRHOIDS, DR. LAILA JEAN-BAPTISTE AT [facility]    ENDOSCOPY N/A 04/19/2022    MEDIUM AMOUNT OF FOOD IN GASTRIC ANTRUM, GASTRITIS, DR. LAILA JEAN-BAPTISTE AT [facility]    EYE ENUCLEATION Right     HAND TENDON REPAIR Left 09/2019    AT [facility]    NECK SURGERY N/A 2001    SCROTAL SURGERY      WITH SKIN GRAFT FROM  LEFT THIGH    TRANSESOPHAGEAL ECHOCARDIOGRAM (RAFFAELE) N/A 08/28/2020    DR. NEETA CRUZ AT [place]       /80 (BP Location: Right arm, Patient Position: Sitting, Cuff Size: Adult)   Temp 98 °F (36.7 °C)   Ht 172.7 cm (68\")   Wt 76.2 kg (168 lb)   SpO2 100%   BMI 25.54 kg/m²   Body mass index is 25.54 kg/m².      PE:  Physical Exam  Constitutional:       General: He is not in acute distress.     Appearance: He is well-developed.   HENT:      Head: Normocephalic and atraumatic.   Abdominal:      General: There is no distension.      Palpations: Abdomen is soft.   Musculoskeletal:         General: Normal range of motion.   Neurological:      Mental Status: He is alert.   Psychiatric:         Thought Content: Thought content normal.           Assessment:   1. Internal hemorrhoids         2. Schizophrenia    Plan:  I recommend a ligation of internal hemorrhoids on 02/08/2024. He was informed of typical surgical time, post-op recovery including pain management, and restrictions. I discussed with patient risks, benefits, and alternatives. The patient had an opportunity to ask questions, and all were answered. He understands and wishes to proceed with the procedure. He was advised to take a stool softener daily while he is recovering to make bowel movements easier to pass. He will be prescribed a topical lidocaine for pain management. He was encouraged to speak with his psychiatrist about his medications.    Transcribed from ambient dictation for Edna Gilmore MD by Yamilex Sotelo  02/08/24     Patient or patient representative verbalized consent to the visit recording.  I have personally performed the services described " in this document as transcribed by the above individual, and it is both accurate and complete.  Patient verbalized consent to the visit recording.

## 2024-02-02 NOTE — H&P (VIEW-ONLY)
"Barrett Arriaza is a 58 y.o. male in for follow up of Internal hemorrhoids    The patient has been seen by Windy Young PA-C, in the office. He underwent colonoscopies approximately 1 year ago that showed some large hemorrhoids and a polyp. It appears he has had some past substance abuse.    The patient is scheduled for ligation of internal hemorrhoids on 02/08/2024. He reports experiencing severe pain. He presented to [facility] emergency room due to severe pain associated with his internal hemorrhoids. He was prescribed Norco 10 tablets, and he has been taking \"pieces\" of the tablets. He continues to experience pain while sitting. His insurance would not cover suppositories. He has been consuming beans frequently to avoid straining when passing a bowel movement. He was prescribed hydrocortisone on 01/25/2024.    The patient has a history of schizophrenia. He takes Seroquel and Celexa. He is scheduled to see his psychiatrist on 02/02/2024. He notes that he has cancer, and he does not have regular follow-up with health care providers.  Past Medical History:   Diagnosis Date    Alcohol use disorder, severe, in early remission 03/09/2023    Stable Length (duration) of remission: 30 DAYS In the event of cravings or relapse, resource reviewed: Alcoholics Anonymous https://www.aa.org/ Follow up Provider: PCP, Medication Assisted Treatment (MAT) Program and Other Follow up in: One month    Alcohol withdrawal syndrome without complication 06/16/2018    Asbestos exposure     Carpal tunnel syndrome of right wrist 03/26/2022    SEEN AT [facility] ER    Cervical radiculopathy 08/2021    Cervical spondylosis     Chest pain 11/11/2020    CARDIAC CATH DONE [facility]  - NORMAL    Chronic pain 08/28/2020    Chronic post-traumatic stress disorder 06/08/2021    Closed head injury without loss of consciousness 06/11/2023    SEEN AT [facility]    Cocaine abuse 01/01/1995    Colitis 09/12/2016    SEEN AT [facility]    Colon polyps     FOLLOWED BY " DR. LAILA JEAN-BAPTISTE    Coronary artery vasospasm 11/12/2020    DDD (degenerative disc disease), lumbar     Deep vein thrombosis (DVT) of left lower extremity 02/05/2019    Elevated liver enzymes 10/2021    Excessive daytime sleepiness 12/21/2023    Glaucoma     Hallucinations 10/26/2021    SEEN AT [place] [facility]    Hemorrhoids     Herniated lumbar intervertebral disc 08/2020    History of testicular cancer     Irritable bowel syndrome with diarrhea 12/19/2023    Left varicocele 02/04/2019    SEEN AT [facility]    Lumbar paraspinal muscle spasm 07/2020    Malnutrition 10/2023    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 GARCIA    Rectal bleeding 02/15/2022    SEEN AT [facility]    Schizoaffective disorder, bipolar type 02/02/2022    Condition: stable Last mental health visit 3/8/23 [facility] Medications: Taking medications as prescribed If taking medications, do not stop treatment without consulting healthcare provider. If symptoms worsen or do not improve/stabilize, notify health care provider right away. If thoughts of harming s    Snoring 12/18/2023    Sprain of cervical neck 06/2023    Syncope and collapse 02/05/2019     Past Surgical History:   Procedure Laterality Date    ANTERIOR CERVICAL DISCECTOMY W/ FUSION N/A 01/04/2021    C4-5, DR. DUANE DENSLER AT [place]    CARDIAC CATHETERIZATION Left 10/02/2020    DR. RICO MARADIAGA AT [place]    CHOLECYSTECTOMY N/A 02/13/2015    LAPAROSCOPIC WITH IOC, DR. CARLOS CONCEPCION AT [place]    COLONOSCOPY N/A 04/19/2022    FLEXIBLE SIGMOIDOSCOOPYY TO 45 CM,LARGE HEMORRHOIDS, SOLID STOOL IN SIGMOID, RESCOPE IN 6 MONTHS, DR. LAILA JEAN-BAPTISTE AT [facility]    COLONOSCOPY N/A 05/10/2022    5 MM TUBULAR ADENOMA POLYP IN DESCENDING, 4 MM BENIGN POLYP IN SIGMOID, MEDIUM DIVERTICULA IN SIGMOID,  "LARGE HEMORRHOIDS, DR. LAILA JEAN-BAPTISTE AT [facility]    ENDOSCOPY N/A 04/19/2022    MEDIUM AMOUNT OF FOOD IN GASTRIC ANTRUM, GASTRITIS, DR. LAILA JEAN-BAPTISTE AT [facility]    EYE ENUCLEATION Right     HAND TENDON REPAIR Left 09/2019    AT [facility]    NECK SURGERY N/A 2001    SCROTAL SURGERY      WITH SKIN GRAFT FROM  LEFT THIGH    TRANSESOPHAGEAL ECHOCARDIOGRAM (RAFFAELE) N/A 08/28/2020    DR. NEETA CRUZ AT [place]       /80 (BP Location: Right arm, Patient Position: Sitting, Cuff Size: Adult)   Temp 98 °F (36.7 °C)   Ht 172.7 cm (68\")   Wt 76.2 kg (168 lb)   SpO2 100%   BMI 25.54 kg/m²   Body mass index is 25.54 kg/m².      PE:  Physical Exam  Constitutional:       General: He is not in acute distress.     Appearance: He is well-developed.   HENT:      Head: Normocephalic and atraumatic.   Abdominal:      General: There is no distension.      Palpations: Abdomen is soft.   Musculoskeletal:         General: Normal range of motion.   Neurological:      Mental Status: He is alert.   Psychiatric:         Thought Content: Thought content normal.           Assessment:   1. Internal hemorrhoids         2. Schizophrenia    Plan:  I recommend a ligation of internal hemorrhoids on 02/08/2024. He was informed of typical surgical time, post-op recovery including pain management, and restrictions. I discussed with patient risks, benefits, and alternatives. The patient had an opportunity to ask questions, and all were answered. He understands and wishes to proceed with the procedure. He was advised to take a stool softener daily while he is recovering to make bowel movements easier to pass. He will be prescribed a topical lidocaine for pain management. He was encouraged to speak with his psychiatrist about his medications.    Transcribed from ambient dictation for Edna Gilmore MD by Yamilex Sotelo  02/08/24     Patient or patient representative verbalized consent to the visit recording.  I have personally performed the services described " in this document as transcribed by the above individual, and it is both accurate and complete.  Patient verbalized consent to the visit recording.

## 2024-02-08 ENCOUNTER — HOSPITAL ENCOUNTER (OUTPATIENT)
Facility: HOSPITAL | Age: 59
Setting detail: HOSPITAL OUTPATIENT SURGERY
Discharge: HOME | End: 2024-02-08
Attending: COLON & RECTAL SURGERY | Admitting: COLON & RECTAL SURGERY
Payer: COMMERCIAL

## 2024-02-08 ENCOUNTER — ANESTHESIA (OUTPATIENT)
Dept: PERIOP | Facility: HOSPITAL | Age: 59
End: 2024-02-08
Payer: COMMERCIAL

## 2024-02-08 ENCOUNTER — ANESTHESIA EVENT (OUTPATIENT)
Dept: PERIOP | Facility: HOSPITAL | Age: 59
End: 2024-02-08
Payer: COMMERCIAL

## 2024-02-08 VITALS
TEMPERATURE: 97.6 F | SYSTOLIC BLOOD PRESSURE: 104 MMHG | HEART RATE: 71 BPM | RESPIRATION RATE: 16 BRPM | BODY MASS INDEX: 25.01 KG/M2 | OXYGEN SATURATION: 97 % | WEIGHT: 165 LBS | HEIGHT: 68 IN | DIASTOLIC BLOOD PRESSURE: 80 MMHG

## 2024-02-08 DIAGNOSIS — K64.8 INTERNAL HEMORRHOIDS: ICD-10-CM

## 2024-02-08 PROCEDURE — 25010000002 CLINDAMYCIN 900 MG/50ML SOLUTION: Performed by: PHYSICIAN ASSISTANT

## 2024-02-08 PROCEDURE — 25010000002 KETOROLAC TROMETHAMINE PER 15 MG

## 2024-02-08 PROCEDURE — 25010000002 AZTREONAM PER 500 MG: Performed by: PHYSICIAN ASSISTANT

## 2024-02-08 PROCEDURE — 25010000002 FENTANYL CITRATE (PF) 50 MCG/ML SOLUTION: Performed by: ANESTHESIOLOGY

## 2024-02-08 PROCEDURE — 25010000002 BUPIVACAINE (PF) 0.25 % SOLUTION 10 ML VIAL: Performed by: COLON & RECTAL SURGERY

## 2024-02-08 PROCEDURE — 46260 REMOVE IN/EX HEM GROUPS 2+: CPT | Performed by: COLON & RECTAL SURGERY

## 2024-02-08 PROCEDURE — 25010000002 MIDAZOLAM PER 1 MG: Performed by: ANESTHESIOLOGY

## 2024-02-08 PROCEDURE — 25010000002 PROPOFOL 200 MG/20ML EMULSION

## 2024-02-08 PROCEDURE — 88304 TISSUE EXAM BY PATHOLOGIST: CPT | Performed by: COLON & RECTAL SURGERY

## 2024-02-08 PROCEDURE — 25010000002 ONDANSETRON PER 1 MG

## 2024-02-08 PROCEDURE — 25010000002 SUGAMMADEX 200 MG/2ML SOLUTION

## 2024-02-08 PROCEDURE — 25010000002 DEXAMETHASONE SODIUM PHOSPHATE 20 MG/5ML SOLUTION

## 2024-02-08 PROCEDURE — 25810000003 LACTATED RINGERS PER 1000 ML: Performed by: ANESTHESIOLOGY

## 2024-02-08 PROCEDURE — 25010000002 HYDROMORPHONE PER 4 MG: Performed by: ANESTHESIOLOGY

## 2024-02-08 RX ORDER — FENTANYL CITRATE 50 UG/ML
50 INJECTION, SOLUTION INTRAMUSCULAR; INTRAVENOUS
Status: DISCONTINUED | OUTPATIENT
Start: 2024-02-08 | End: 2024-02-08 | Stop reason: HOSPADM

## 2024-02-08 RX ORDER — KETOROLAC TROMETHAMINE 30 MG/ML
INJECTION, SOLUTION INTRAMUSCULAR; INTRAVENOUS AS NEEDED
Status: DISCONTINUED | OUTPATIENT
Start: 2024-02-08 | End: 2024-02-08 | Stop reason: SURG

## 2024-02-08 RX ORDER — PROMETHAZINE HYDROCHLORIDE 25 MG/1
25 SUPPOSITORY RECTAL ONCE AS NEEDED
Status: DISCONTINUED | OUTPATIENT
Start: 2024-02-08 | End: 2024-02-08 | Stop reason: HOSPADM

## 2024-02-08 RX ORDER — HYDROCODONE BITARTRATE AND ACETAMINOPHEN 5; 325 MG/1; MG/1
1 TABLET ORAL ONCE AS NEEDED
Status: COMPLETED | OUTPATIENT
Start: 2024-02-08 | End: 2024-02-08

## 2024-02-08 RX ORDER — PROPOFOL 10 MG/ML
INJECTION, EMULSION INTRAVENOUS AS NEEDED
Status: DISCONTINUED | OUTPATIENT
Start: 2024-02-08 | End: 2024-02-08 | Stop reason: SURG

## 2024-02-08 RX ORDER — SODIUM CHLORIDE 0.9 % (FLUSH) 0.9 %
3-10 SYRINGE (ML) INJECTION AS NEEDED
Status: DISCONTINUED | OUTPATIENT
Start: 2024-02-08 | End: 2024-02-08 | Stop reason: HOSPADM

## 2024-02-08 RX ORDER — HYDROMORPHONE HYDROCHLORIDE 1 MG/ML
0.5 INJECTION, SOLUTION INTRAMUSCULAR; INTRAVENOUS; SUBCUTANEOUS
Status: DISCONTINUED | OUTPATIENT
Start: 2024-02-08 | End: 2024-02-08 | Stop reason: HOSPADM

## 2024-02-08 RX ORDER — FLUMAZENIL 0.1 MG/ML
0.2 INJECTION INTRAVENOUS AS NEEDED
Status: DISCONTINUED | OUTPATIENT
Start: 2024-02-08 | End: 2024-02-08 | Stop reason: HOSPADM

## 2024-02-08 RX ORDER — ACETAMINOPHEN 650 MG
TABLET, EXTENDED RELEASE ORAL AS NEEDED
Status: DISCONTINUED | OUTPATIENT
Start: 2024-02-08 | End: 2024-02-08 | Stop reason: HOSPADM

## 2024-02-08 RX ORDER — ROCURONIUM BROMIDE 10 MG/ML
INJECTION, SOLUTION INTRAVENOUS AS NEEDED
Status: DISCONTINUED | OUTPATIENT
Start: 2024-02-08 | End: 2024-02-08 | Stop reason: SURG

## 2024-02-08 RX ORDER — SODIUM CHLORIDE, SODIUM LACTATE, POTASSIUM CHLORIDE, CALCIUM CHLORIDE 600; 310; 30; 20 MG/100ML; MG/100ML; MG/100ML; MG/100ML
9 INJECTION, SOLUTION INTRAVENOUS CONTINUOUS
Status: DISCONTINUED | OUTPATIENT
Start: 2024-02-08 | End: 2024-02-08 | Stop reason: HOSPADM

## 2024-02-08 RX ORDER — NALOXONE HCL 0.4 MG/ML
0.2 VIAL (ML) INJECTION AS NEEDED
Status: DISCONTINUED | OUTPATIENT
Start: 2024-02-08 | End: 2024-02-08 | Stop reason: HOSPADM

## 2024-02-08 RX ORDER — CITALOPRAM 20 MG/1
20 TABLET ORAL DAILY
COMMUNITY
Start: 2024-02-02 | End: 2024-04-01

## 2024-02-08 RX ORDER — HYDROCODONE BITARTRATE AND ACETAMINOPHEN 5; 325 MG/1; MG/1
1-2 TABLET ORAL EVERY 6 HOURS PRN
Qty: 32 TABLET | Refills: 0 | Status: SHIPPED | OUTPATIENT
Start: 2024-02-08

## 2024-02-08 RX ORDER — SODIUM CHLORIDE 9 MG/ML
40 INJECTION, SOLUTION INTRAVENOUS AS NEEDED
Status: DISCONTINUED | OUTPATIENT
Start: 2024-02-08 | End: 2024-02-08 | Stop reason: HOSPADM

## 2024-02-08 RX ORDER — ACETAMINOPHEN 500 MG
1000 TABLET ORAL ONCE
Status: COMPLETED | OUTPATIENT
Start: 2024-02-08 | End: 2024-02-08

## 2024-02-08 RX ORDER — POLYETHYLENE GLYCOL 3350 17 G/17G
17 POWDER, FOR SOLUTION ORAL 2 TIMES DAILY
Start: 2024-02-08

## 2024-02-08 RX ORDER — CLINDAMYCIN PHOSPHATE 900 MG/50ML
900 INJECTION, SOLUTION INTRAVENOUS ONCE
Status: COMPLETED | OUTPATIENT
Start: 2024-02-08 | End: 2024-02-08

## 2024-02-08 RX ORDER — ONDANSETRON 4 MG/1
4 TABLET, ORALLY DISINTEGRATING ORAL ONCE AS NEEDED
Status: DISCONTINUED | OUTPATIENT
Start: 2024-02-08 | End: 2024-02-08 | Stop reason: HOSPADM

## 2024-02-08 RX ORDER — IBUPROFEN 600 MG/1
600 TABLET ORAL ONCE AS NEEDED
Status: DISCONTINUED | OUTPATIENT
Start: 2024-02-08 | End: 2024-02-08 | Stop reason: HOSPADM

## 2024-02-08 RX ORDER — LABETALOL HYDROCHLORIDE 5 MG/ML
5 INJECTION, SOLUTION INTRAVENOUS
Status: DISCONTINUED | OUTPATIENT
Start: 2024-02-08 | End: 2024-02-08 | Stop reason: HOSPADM

## 2024-02-08 RX ORDER — SODIUM CHLORIDE 0.9 % (FLUSH) 0.9 %
3 SYRINGE (ML) INJECTION EVERY 12 HOURS SCHEDULED
Status: DISCONTINUED | OUTPATIENT
Start: 2024-02-08 | End: 2024-02-08 | Stop reason: HOSPADM

## 2024-02-08 RX ORDER — ONDANSETRON 2 MG/ML
4 INJECTION INTRAMUSCULAR; INTRAVENOUS ONCE AS NEEDED
Status: DISCONTINUED | OUTPATIENT
Start: 2024-02-08 | End: 2024-02-08 | Stop reason: HOSPADM

## 2024-02-08 RX ORDER — MAGNESIUM HYDROXIDE 1200 MG/15ML
LIQUID ORAL AS NEEDED
Status: DISCONTINUED | OUTPATIENT
Start: 2024-02-08 | End: 2024-02-08 | Stop reason: HOSPADM

## 2024-02-08 RX ORDER — LIDOCAINE 50 MG/G
1 OINTMENT TOPICAL EVERY 4 HOURS PRN
Qty: 35.44 G | Refills: 4 | Status: SHIPPED | OUTPATIENT
Start: 2024-02-08 | End: 2024-03-09

## 2024-02-08 RX ORDER — FAMOTIDINE 10 MG/ML
20 INJECTION, SOLUTION INTRAVENOUS ONCE
Status: COMPLETED | OUTPATIENT
Start: 2024-02-08 | End: 2024-02-08

## 2024-02-08 RX ORDER — HYDRALAZINE HYDROCHLORIDE 20 MG/ML
5 INJECTION INTRAMUSCULAR; INTRAVENOUS
Status: DISCONTINUED | OUTPATIENT
Start: 2024-02-08 | End: 2024-02-08 | Stop reason: HOSPADM

## 2024-02-08 RX ORDER — DROPERIDOL 2.5 MG/ML
0.62 INJECTION, SOLUTION INTRAMUSCULAR; INTRAVENOUS
Status: DISCONTINUED | OUTPATIENT
Start: 2024-02-08 | End: 2024-02-08 | Stop reason: HOSPADM

## 2024-02-08 RX ORDER — LIDOCAINE HYDROCHLORIDE 20 MG/ML
INJECTION, SOLUTION INFILTRATION; PERINEURAL AS NEEDED
Status: DISCONTINUED | OUTPATIENT
Start: 2024-02-08 | End: 2024-02-08 | Stop reason: SURG

## 2024-02-08 RX ORDER — DIPHENHYDRAMINE HYDROCHLORIDE 50 MG/ML
12.5 INJECTION INTRAMUSCULAR; INTRAVENOUS
Status: DISCONTINUED | OUTPATIENT
Start: 2024-02-08 | End: 2024-02-08 | Stop reason: HOSPADM

## 2024-02-08 RX ORDER — DEXAMETHASONE SODIUM PHOSPHATE 4 MG/ML
INJECTION, SOLUTION INTRA-ARTICULAR; INTRALESIONAL; INTRAMUSCULAR; INTRAVENOUS; SOFT TISSUE AS NEEDED
Status: DISCONTINUED | OUTPATIENT
Start: 2024-02-08 | End: 2024-02-08 | Stop reason: SURG

## 2024-02-08 RX ORDER — MIDAZOLAM HYDROCHLORIDE 1 MG/ML
1 INJECTION INTRAMUSCULAR; INTRAVENOUS
Status: DISCONTINUED | OUTPATIENT
Start: 2024-02-08 | End: 2024-02-08 | Stop reason: HOSPADM

## 2024-02-08 RX ORDER — ONDANSETRON 2 MG/ML
INJECTION INTRAMUSCULAR; INTRAVENOUS AS NEEDED
Status: DISCONTINUED | OUTPATIENT
Start: 2024-02-08 | End: 2024-02-08 | Stop reason: SURG

## 2024-02-08 RX ORDER — PROMETHAZINE HYDROCHLORIDE 25 MG/1
25 TABLET ORAL ONCE AS NEEDED
Status: DISCONTINUED | OUTPATIENT
Start: 2024-02-08 | End: 2024-02-08 | Stop reason: HOSPADM

## 2024-02-08 RX ORDER — EPHEDRINE SULFATE 50 MG/ML
5 INJECTION, SOLUTION INTRAVENOUS ONCE AS NEEDED
Status: DISCONTINUED | OUTPATIENT
Start: 2024-02-08 | End: 2024-02-08 | Stop reason: HOSPADM

## 2024-02-08 RX ORDER — IPRATROPIUM BROMIDE AND ALBUTEROL SULFATE 2.5; .5 MG/3ML; MG/3ML
3 SOLUTION RESPIRATORY (INHALATION) ONCE AS NEEDED
Status: DISCONTINUED | OUTPATIENT
Start: 2024-02-08 | End: 2024-02-08 | Stop reason: HOSPADM

## 2024-02-08 RX ADMIN — DEXAMETHASONE SODIUM PHOSPHATE 8 MG: 4 INJECTION, SOLUTION INTRAMUSCULAR; INTRAVENOUS at 09:10

## 2024-02-08 RX ADMIN — ROCURONIUM BROMIDE 40 MG: 10 INJECTION, SOLUTION INTRAVENOUS at 08:47

## 2024-02-08 RX ADMIN — FENTANYL CITRATE 50 MCG: 50 INJECTION, SOLUTION INTRAMUSCULAR; INTRAVENOUS at 08:47

## 2024-02-08 RX ADMIN — AZTREONAM 2 G: 2 INJECTION, POWDER, LYOPHILIZED, FOR SOLUTION INTRAMUSCULAR; INTRAVENOUS at 08:32

## 2024-02-08 RX ADMIN — Medication 3 ML: at 08:03

## 2024-02-08 RX ADMIN — FENTANYL CITRATE 50 MCG: 50 INJECTION, SOLUTION INTRAMUSCULAR; INTRAVENOUS at 10:02

## 2024-02-08 RX ADMIN — ONDANSETRON 4 MG: 2 INJECTION INTRAMUSCULAR; INTRAVENOUS at 09:10

## 2024-02-08 RX ADMIN — FENTANYL CITRATE 50 MCG: 50 INJECTION, SOLUTION INTRAMUSCULAR; INTRAVENOUS at 08:10

## 2024-02-08 RX ADMIN — FAMOTIDINE 20 MG: 10 INJECTION INTRAVENOUS at 07:57

## 2024-02-08 RX ADMIN — HYDROMORPHONE HYDROCHLORIDE 0.5 MG: 1 INJECTION, SOLUTION INTRAMUSCULAR; INTRAVENOUS; SUBCUTANEOUS at 10:16

## 2024-02-08 RX ADMIN — HYDROCODONE BITARTRATE AND ACETAMINOPHEN 1 TABLET: 5; 325 TABLET ORAL at 10:26

## 2024-02-08 RX ADMIN — KETOROLAC TROMETHAMINE 30 MG: 30 INJECTION, SOLUTION INTRAMUSCULAR; INTRAVENOUS at 09:10

## 2024-02-08 RX ADMIN — ACETAMINOPHEN 1000 MG: 500 TABLET ORAL at 07:57

## 2024-02-08 RX ADMIN — SUGAMMADEX 200 MG: 100 INJECTION, SOLUTION INTRAVENOUS at 09:17

## 2024-02-08 RX ADMIN — PROPOFOL 200 MG: 10 INJECTION, EMULSION INTRAVENOUS at 08:47

## 2024-02-08 RX ADMIN — LIDOCAINE HYDROCHLORIDE 100 MG: 20 INJECTION, SOLUTION INFILTRATION; PERINEURAL at 08:47

## 2024-02-08 RX ADMIN — CLINDAMYCIN PHOSPHATE 900 MG: 900 INJECTION, SOLUTION INTRAVENOUS at 08:32

## 2024-02-08 RX ADMIN — SODIUM CHLORIDE, POTASSIUM CHLORIDE, SODIUM LACTATE AND CALCIUM CHLORIDE 9 ML/HR: 600; 310; 30; 20 INJECTION, SOLUTION INTRAVENOUS at 08:03

## 2024-02-08 RX ADMIN — MIDAZOLAM 1 MG: 1 INJECTION INTRAMUSCULAR; INTRAVENOUS at 08:00

## 2024-02-08 NOTE — DISCHARGE INSTRUCTIONS
Dr. Edna Gilmore  [address]  [place] [id number] [phone]      Discharge Instructions for Hemorrhoidectomy/Anal Fissures/Fistulas      Go home, rest and take it easy today; however, you should get up and move about several times today to reduce the risk of developing a clot in your legs.      You may experience some dizziness or memory loss from the anesthesia.  This may last for the next 24 hours.  Someone should plan on staying with you for the first 24 hours for your safety.    Do not make any important legal decisions or sign any legal papers for the next 24 hours.      Eat and drink lightly today.  Start off with liquids, jello, soup, crackers or other bland foods at first. Please drink plenty of fluids. You may advance your diet tomorrow as tolerated as long as you do not experience any nausea or vomiting.     Some patients will have packing in their rectum.  It should come out will your first bowel movement.  You may remove it sooner yourself if it bothers you.  If it comes out on its own before your first bowel movement, do not worry about it.  It does not need to be replaced.      Begin your sitz baths tomorrow.    The best method of pain relief is a sitz bath (sitting in a tub or warm water) at least 3 times daily for 10 minutes.  This helps to reduce pain and aids with hygiene/drainage.  The drainage may have an unpleasant odor.  This is not unexpected and should be controlled with baths and showers.  If the skin around the anal area becomes irritated, you may apply Vaseline, A&D ointment or a similar barrier cream to the area.       Bleeding and drainage are to be expected and may persist for as long as 2-4 weeks.  Bleeding may occur with your bowel movements as well.  Wear a cotton liner such as a Kotex pad or a panty liner inside your underwear to protect your clothing.       You have received a prescription for a narcotic pain medicine, as you will have pain following surgery.    You will not be totally pain free, but your pain medicine should make the pain tolerable.  Please take your pain medicine as prescribed and always take your pills with food to prevent nausea. Your pain may persist for 1-3 weeks. If you are having severe pain that cannot be controlled by the pain medicine, please contact me.  Typically, patients with anal fissures will have less pain than those with hemorrhoids.      The goal is for your bowel movements to be soft which will help to minimize pain.  The pain medicine used to keep your comfortable may also cause some constipation so I recommend the following:    Miralax (17 grams)--1 capfull every day starting the day after surgery.    Keep taking fiber everyday (Citrucel, Metamucil, or Fiber-con) as directed.    If you are unable to have a bowel movement by 2 days after surgery, try Milk of Magnesia, Magnesium Citrate, or Colace.  If still unable to have a bowel movement, call the office at [phone]      No driving for 24 hours and for as long as you are taking your prescription pain medicine.  You may resume your activities gradually.       You will need to call the office at [phone] to schedule a follow-up appointment in 10-21 days.    Remember to contact me for any of the following:    Fever > 100.5 degrees  Severe pain that cannot be controlled by taking your pain pills  Severe nausea or vomiting   Significant bleeding > 1/4 cup  Any other questions or concerns     PAST MEDICAL HISTORY:  No pertinent past medical history

## 2024-02-08 NOTE — ANESTHESIA PROCEDURE NOTES
Airway  Urgency: elective    Date/Time: 2/8/2024 8:47 AM  Airway not difficult    General Information and Staff    Patient location during procedure: OR  Anesthesiologist: Everett Tariq MD  CRNA/CAA: Fransico العلي CRNA    Indications and Patient Condition  Indications for airway management: airway protection    Preoxygenated: yes  MILS maintained throughout  Mask difficulty assessment: 2 - vent by mask + OA or adjuvant +/- NMBA    Final Airway Details  Final airway type: endotracheal airway      Successful airway: ETT  Cuffed: yes   Successful intubation technique: direct laryngoscopy  Facilitating devices/methods: intubating stylet  Endotracheal tube insertion site: oral  Blade: [place]  Blade size: 2  ETT size (mm): 7.5  Cormack-Lehane Classification: grade IIb - view of arytenoids or posterior of glottis only  Placement verified by: chest auscultation and capnometry   Measured from: lips  ETT/EBT  to lips (cm): 22  Number of attempts at approach: 2  Assessment: lips, teeth, and gum same as pre-op and atraumatic intubation

## 2024-02-08 NOTE — ANESTHESIA PREPROCEDURE EVALUATION
Anesthesia Evaluation     Patient summary reviewed   NPO Solid Status: > 8 hours  NPO Liquid Status: > 2 hours           Airway   Mallampati: I  TM distance: >3 FB  Neck ROM: full  No difficulty expected  Dental - normal exam   (+) edentulous    Pulmonary    (+) a smoker Current, Smoked day of surgery,shortness of breath  (-) sleep apnea  Cardiovascular - normal exam  Exercise tolerance: good (4-7 METS)    (+) DVT resolved  (-) CAD      Neuro/Psych  (+) psychiatric history  GI/Hepatic/Renal/Endo    (+) GI bleeding lower resolved    Musculoskeletal     Abdominal    Substance History   (+) alcohol use, drug use     OB/GYN          Other      history of cancer remission    ROS/Med Hx Other: Uses cannabis everyday.               Anesthesia Plan    ASA 2     general     intravenous induction     Anesthetic plan, risks, benefits, and alternatives have been provided, discussed and informed consent has been obtained with: patient.  Pre-procedure education provided    CODE STATUS:

## 2024-02-08 NOTE — ANESTHESIA POSTPROCEDURE EVALUATION
"Patient: Barrett Arriaza    Procedure Summary       Date: 02/08/24 Room / Location: [facility] OR [address] [place] [address] MAIN OR    Anesthesia Start: 0836 Anesthesia Stop: 0933    Procedure: LIGATION OF INTERNAL HEMORROIDS (Anus) Diagnosis:       Internal hemorrhoids      (Internal hemorrhoids [K64.8])    Surgeons: Edna Gilmore MD Provider: Everett Tariq MD    Anesthesia Type: general ASA Status: 2            Anesthesia Type: general    Vitals  Vitals Value Taken Time   /90 02/08/24 1030   Temp 36.4 °C (97.6 °F) 02/08/24 0930   Pulse 73 02/08/24 1040   Resp 16 02/08/24 1030   SpO2 100 % 02/08/24 1040   Vitals shown include unfiled device data.        Post Anesthesia Care and Evaluation    Level of consciousness: awake and alert  Pain management: adequate    Airway patency: patent  Anesthetic complications: No anesthetic complications  PONV Status: none  Cardiovascular status: acceptable  Respiratory status: acceptable  Hydration status: acceptable    Comments: /80   Pulse 71   Temp 36.4 °C (97.6 °F) (Oral)   Resp 16   Ht 172.7 cm (68\")   Wt 74.8 kg (165 lb)   SpO2 97%   BMI 25.09 kg/m²       "

## 2024-02-09 LAB
LAB AP CASE REPORT: NORMAL
PATH REPORT.FINAL DX SPEC: NORMAL
PATH REPORT.GROSS SPEC: NORMAL

## 2024-02-12 ENCOUNTER — HOSPITAL ENCOUNTER (EMERGENCY)
Facility: HOSPITAL | Age: 59
Discharge: HOME | End: 2024-02-12
Attending: STUDENT IN AN ORGANIZED HEALTH CARE EDUCATION/TRAINING PROGRAM | Admitting: STUDENT IN AN ORGANIZED HEALTH CARE EDUCATION/TRAINING PROGRAM
Payer: COMMERCIAL

## 2024-02-12 VITALS
DIASTOLIC BLOOD PRESSURE: 93 MMHG | BODY MASS INDEX: 25.9 KG/M2 | OXYGEN SATURATION: 94 % | WEIGHT: 165 LBS | HEIGHT: 67 IN | TEMPERATURE: 97.8 F | HEART RATE: 67 BPM | SYSTOLIC BLOOD PRESSURE: 106 MMHG | RESPIRATION RATE: 16 BRPM

## 2024-02-12 DIAGNOSIS — G89.18 POST-OP PAIN: Primary | ICD-10-CM

## 2024-02-12 LAB
ALBUMIN SERPL-MCNC: 4.3 G/DL (ref 3.5–5.2)
ALBUMIN/GLOB SERPL: 1.6 G/DL
ALP SERPL-CCNC: 96 U/L (ref 39–117)
ALT SERPL W P-5'-P-CCNC: 24 U/L (ref 1–41)
ANION GAP SERPL CALCULATED.3IONS-SCNC: 8 MMOL/L (ref 5–15)
AST SERPL-CCNC: 21 U/L (ref 1–40)
BACTERIA UR QL AUTO: ABNORMAL /HPF
BASOPHILS # BLD AUTO: 0.04 10*3/MM3 (ref 0–0.2)
BASOPHILS NFR BLD AUTO: 0.5 % (ref 0–1.5)
BILIRUB SERPL-MCNC: 0.8 MG/DL (ref 0–1.2)
BILIRUB UR QL STRIP: NEGATIVE
BUN SERPL-MCNC: 10 MG/DL (ref 6–20)
BUN/CREAT SERPL: 12.2 (ref 7–25)
CALCIUM SPEC-SCNC: 9.6 MG/DL (ref 8.6–10.5)
CHLORIDE SERPL-SCNC: 103 MMOL/L (ref 98–107)
CLARITY UR: CLEAR
CO2 SERPL-SCNC: 27 MMOL/L (ref 22–29)
COLOR UR: YELLOW
CREAT SERPL-MCNC: 0.82 MG/DL (ref 0.76–1.27)
DEPRECATED RDW RBC AUTO: 43.3 FL (ref 37–54)
EGFRCR SERPLBLD CKD-EPI 2021: 103.1 ML/MIN/1.73
EOSINOPHIL # BLD AUTO: 0.49 10*3/MM3 (ref 0–0.4)
EOSINOPHIL NFR BLD AUTO: 6.4 % (ref 0.3–6.2)
ERYTHROCYTE [DISTWIDTH] IN BLOOD BY AUTOMATED COUNT: 12.7 % (ref 12.3–15.4)
GLOBULIN UR ELPH-MCNC: 2.7 GM/DL
GLUCOSE SERPL-MCNC: 92 MG/DL (ref 65–99)
GLUCOSE UR STRIP-MCNC: NEGATIVE MG/DL
HCT VFR BLD AUTO: 46.7 % (ref 37.5–51)
HGB BLD-MCNC: 16.2 G/DL (ref 13–17.7)
HGB UR QL STRIP.AUTO: ABNORMAL
HYALINE CASTS UR QL AUTO: ABNORMAL /LPF
IMM GRANULOCYTES # BLD AUTO: 0.04 10*3/MM3 (ref 0–0.05)
IMM GRANULOCYTES NFR BLD AUTO: 0.5 % (ref 0–0.5)
KETONES UR QL STRIP: NEGATIVE
LEUKOCYTE ESTERASE UR QL STRIP.AUTO: NEGATIVE
LYMPHOCYTES # BLD AUTO: 2.06 10*3/MM3 (ref 0.7–3.1)
LYMPHOCYTES NFR BLD AUTO: 27.1 % (ref 19.6–45.3)
MCH RBC QN AUTO: 32.5 PG (ref 26.6–33)
MCHC RBC AUTO-ENTMCNC: 34.7 G/DL (ref 31.5–35.7)
MCV RBC AUTO: 93.6 FL (ref 79–97)
MONOCYTES # BLD AUTO: 1.49 10*3/MM3 (ref 0.1–0.9)
MONOCYTES NFR BLD AUTO: 19.6 % (ref 5–12)
NEUTROPHILS NFR BLD AUTO: 3.48 10*3/MM3 (ref 1.7–7)
NEUTROPHILS NFR BLD AUTO: 45.9 % (ref 42.7–76)
NITRITE UR QL STRIP: NEGATIVE
NRBC BLD AUTO-RTO: 0 /100 WBC (ref 0–0.2)
PH UR STRIP.AUTO: 7.5 [PH] (ref 5–8)
PLATELET # BLD AUTO: 257 10*3/MM3 (ref 140–450)
PMV BLD AUTO: 8.7 FL (ref 6–12)
POTASSIUM SERPL-SCNC: 4.1 MMOL/L (ref 3.5–5.2)
PROT SERPL-MCNC: 7 G/DL (ref 6–8.5)
PROT UR QL STRIP: NEGATIVE
RBC # BLD AUTO: 4.99 10*6/MM3 (ref 4.14–5.8)
RBC # UR STRIP: ABNORMAL /HPF
REF LAB TEST METHOD: ABNORMAL
SODIUM SERPL-SCNC: 138 MMOL/L (ref 136–145)
SP GR UR STRIP: 1.02 (ref 1–1.03)
SQUAMOUS #/AREA URNS HPF: ABNORMAL /HPF
UROBILINOGEN UR QL STRIP: ABNORMAL
WBC # UR STRIP: ABNORMAL /HPF
WBC NRBC COR # BLD AUTO: 7.6 10*3/MM3 (ref 3.4–10.8)

## 2024-02-12 PROCEDURE — 99283 EMERGENCY DEPT VISIT LOW MDM: CPT

## 2024-02-12 PROCEDURE — 25810000003 SODIUM CHLORIDE 0.9 % SOLUTION: Performed by: STUDENT IN AN ORGANIZED HEALTH CARE EDUCATION/TRAINING PROGRAM

## 2024-02-12 PROCEDURE — 80053 COMPREHEN METABOLIC PANEL: CPT | Performed by: STUDENT IN AN ORGANIZED HEALTH CARE EDUCATION/TRAINING PROGRAM

## 2024-02-12 PROCEDURE — 81001 URINALYSIS AUTO W/SCOPE: CPT | Performed by: STUDENT IN AN ORGANIZED HEALTH CARE EDUCATION/TRAINING PROGRAM

## 2024-02-12 PROCEDURE — 85025 COMPLETE CBC W/AUTO DIFF WBC: CPT | Performed by: STUDENT IN AN ORGANIZED HEALTH CARE EDUCATION/TRAINING PROGRAM

## 2024-02-12 PROCEDURE — 96374 THER/PROPH/DIAG INJ IV PUSH: CPT

## 2024-02-12 PROCEDURE — 25010000002 HYDROMORPHONE 1 MG/ML SOLUTION: Performed by: STUDENT IN AN ORGANIZED HEALTH CARE EDUCATION/TRAINING PROGRAM

## 2024-02-12 RX ORDER — NALOXONE HYDROCHLORIDE 4 MG/.1ML
SPRAY NASAL
Qty: 2 EACH | Refills: 0 | Status: SHIPPED | OUTPATIENT
Start: 2024-02-12

## 2024-02-12 RX ORDER — HYDROCODONE BITARTRATE AND ACETAMINOPHEN 7.5; 325 MG/1; MG/1
1 TABLET ORAL EVERY 6 HOURS PRN
Qty: 10 TABLET | Refills: 0 | Status: SHIPPED | OUTPATIENT
Start: 2024-02-12

## 2024-02-12 RX ADMIN — HYDROMORPHONE HYDROCHLORIDE 1 MG: 1 INJECTION, SOLUTION INTRAMUSCULAR; INTRAVENOUS; SUBCUTANEOUS at 11:09

## 2024-02-12 RX ADMIN — SODIUM CHLORIDE 1000 ML: 9 INJECTION, SOLUTION INTRAVENOUS at 11:09

## 2024-02-16 ENCOUNTER — TELEPHONE (OUTPATIENT)
Dept: SURGERY | Facility: CLINIC | Age: 59
End: 2024-02-16
Payer: COMMERCIAL

## 2024-02-22 ENCOUNTER — OFFICE VISIT (OUTPATIENT)
Dept: SURGERY | Facility: CLINIC | Age: 59
End: 2024-02-22
Payer: COMMERCIAL

## 2024-02-22 VITALS
HEART RATE: 123 BPM | SYSTOLIC BLOOD PRESSURE: 120 MMHG | HEIGHT: 67 IN | DIASTOLIC BLOOD PRESSURE: 70 MMHG | OXYGEN SATURATION: 97 % | WEIGHT: 169.5 LBS | BODY MASS INDEX: 26.6 KG/M2

## 2024-02-22 DIAGNOSIS — K64.8 INTERNAL HEMORRHOIDS: Primary | ICD-10-CM

## 2024-02-22 PROCEDURE — 1159F MED LIST DOCD IN RCRD: CPT | Performed by: PHYSICIAN ASSISTANT

## 2024-02-22 PROCEDURE — 1160F RVW MEDS BY RX/DR IN RCRD: CPT | Performed by: PHYSICIAN ASSISTANT

## 2024-02-22 PROCEDURE — 99024 POSTOP FOLLOW-UP VISIT: CPT | Performed by: PHYSICIAN ASSISTANT

## 2024-02-22 RX ORDER — MELOXICAM 7.5 MG/1
7.5 TABLET ORAL DAILY PRN
Qty: 30 TABLET | Refills: 0 | Status: SHIPPED | OUTPATIENT
Start: 2024-02-22 | End: 2024-03-23

## 2024-02-22 NOTE — PROGRESS NOTES
"Barrett Arriaza is a 58 y.o. male in for follow up of Internal hemorrhoids    Pt is S/P internal hemorrhoidectomy x3 on 02/08/2024 and presents today for a post-op evaluation.  Was given Rx for [place] upon discharge for post-operative pain management.   He presented to the [facility] ED on 02/12/2024 due to increased pain and was given a higher dose.   Today, states that pain is more tolerable.   Tried taking Tylenol and Ibuprofen, but not helpful.  Lidocaine burned.   Took some of his sister's Meloxicam with improvement. Requesting an Rx for this.   Bleeding resolved and drainage improving.   Taking Miralax BID.  Taking sitz baths.   No purulent drainage or fevers.   Pt states he quit drinking and is trying to avoid dairy products.     /70 (BP Location: Right arm, Patient Position: Sitting, Cuff Size: Adult)   Pulse (!) 123   Ht 170.2 cm (67\")   Wt 76.9 kg (169 lb 8 oz)   SpO2 97%   BMI 26.55 kg/m²   Body mass index is 26.55 kg/m².      PE:  Physical Exam  Exam conducted with a chaperone present.   Constitutional:       General: He is not in acute distress.     Appearance: He is well-developed.   HENT:      Head: Normocephalic and atraumatic.   Abdominal:      General: There is no distension.      Palpations: Abdomen is soft.   Genitourinary:     Comments: Perianal exam: Healing incisions with mucus drainage present. Mild edema anteriorly. No surrounding erythema or purulent drainage.   Musculoskeletal:         General: Normal range of motion.   Neurological:      Mental Status: He is alert.   Psychiatric:         Thought Content: Thought content normal.       Review of Medical Record:  I reviewed medical records as detailed in HPI.      Colonoscopy 05/10/2022:  - 5 mm Tubular Adenoma, Descending Colon  - 4 mm Hyperplastic Polyp, Sigmoid Colon  - Diverticulosis, Sigmoid Colon  - Non-Bleeding Internal Hemorrhoids  - Recall: 5 Years  - Alejandra Oliver, [facility]     FHx:  - Father: Hx of colon cancer     Assessment:   1. " Internal hemorrhoids    - S/P internal hemorrhoidectomy x3 on 02/08/2024    Plan:  - Healing  - Discussed with Pt that mucus drainage and swelling should continue to improve with time and continued healing.  - Continue local wound care and sitz baths  - Rx for Meloxicam provided.   - Miralax PRN  - Follow up as needed

## 2024-03-11 ENCOUNTER — OFFICE VISIT (OUTPATIENT)
Dept: INTERNAL MEDICINE | Facility: CLINIC | Age: 59
End: 2024-03-11
Payer: COMMERCIAL

## 2024-03-11 VITALS
BODY MASS INDEX: 27 KG/M2 | TEMPERATURE: 98.1 F | OXYGEN SATURATION: 97 % | HEIGHT: 67 IN | SYSTOLIC BLOOD PRESSURE: 124 MMHG | HEART RATE: 77 BPM | DIASTOLIC BLOOD PRESSURE: 84 MMHG | WEIGHT: 172 LBS

## 2024-03-11 DIAGNOSIS — R10.84 GENERALIZED ABDOMINAL PAIN: Primary | ICD-10-CM

## 2024-03-11 NOTE — PROGRESS NOTES
"Chief Complaint  Follow-up    Subjective        Barrett Arriaza presents to [facility]  History of Present Illness  Mr. Arriaza is here today for follow up of abdominal pain . He wanted to inform me he had recently went to the hospital for pain in his rectum. He was seen by colon rectal surgeon for hemorrhoids. They were removed surgically and he is recovering well. paperwork for court for his  he is going to court needing paperwork for disability. He states he has stage 3 prostate cancer and was seen by Dr Metz office with Urology. I am unable to see reports, data, labs results or scans supporting this information. I have advised him today to reach out to Urology for notes, records or any supporting documentation.    Abdominal pain:  Mr Arriaza states it is the same GI pain he had before when he saw me. He is canceled his appointment with me on 03/11/24 recently and was unclear if he had a follow up with GI. Appointment for GI will be confirmed and discussed with referrals prior to leaving today. He has seen Dr frederick in the past and as diagnosed with IBS. Appointment is scheduled 03/25/24.       Objective   Vital Signs:  /84 (BP Location: Right arm, Patient Position: Sitting, Cuff Size: Adult)   Pulse 77   Temp 98.1 °F (36.7 °C)   Ht 170.2 cm (67\")   Wt 78 kg (172 lb)   SpO2 97%   BMI 26.94 kg/m²   Estimated body mass index is 26.94 kg/m² as calculated from the following:    Height as of this encounter: 170.2 cm (67\").    Weight as of this encounter: 78 kg (172 lb).               Physical Exam  Vitals reviewed.   Constitutional:       Appearance: Normal appearance.   HENT:      Head: Normocephalic.      Right Ear: Hearing normal.      Left Ear: Hearing normal.      Nose: Nose normal.      Mouth/Throat:      Lips: Pink.      Mouth: Mucous membranes are moist.   Eyes:      Comments: Right eye droop    Cardiovascular:      Rate and Rhythm: Normal rate and " regular rhythm.      Pulses: Normal pulses.      Heart sounds: Normal heart sounds.   Pulmonary:      Effort: Pulmonary effort is normal.      Breath sounds: Normal breath sounds. No stridor, decreased air movement or transmitted upper airway sounds. No decreased breath sounds.   Abdominal:      General: Abdomen is flat. Bowel sounds are normal.      Palpations: Abdomen is soft.      Tenderness: There is generalized abdominal tenderness. There is no right CVA tenderness, left CVA tenderness, guarding or rebound.   Musculoskeletal:      Right lower leg: No edema.      Left lower leg: No edema.   Skin:     General: Skin is warm and dry.      Capillary Refill: Capillary refill takes less than 2 seconds.   Neurological:      General: No focal deficit present.      Mental Status: He is alert and oriented to person, place, and time. Mental status is at baseline.      Sensory: Sensation is intact.      Motor: Motor function is intact.      Coordination: Coordination is intact.      Gait: Gait is intact.   Psychiatric:         Mood and Affect: Mood is anxious and elated.         Speech: Speech is rapid and pressured.         Behavior: Behavior is agitated. Behavior is cooperative.        Result Review :    The following data was reviewed by: REJI Manuel on 03/11/2024:      ED with Saud Edwards MD (02/12/2024)   Admission (Discharged) with Edna Gilmore MD (02/08/2024)          Assessment and Plan     There are no diagnoses linked to this encounter.       I spent 38 minutes caring for Barrett on this date of service. This time includes time spent by me in the following activities:preparing for the visit, reviewing tests, obtaining and/or reviewing a separately obtained history, performing a medically appropriate examination and/or evaluation , counseling and educating the patient/family/caregiver, ordering medications, tests, or procedures, referring and communicating with other health care professionals ,  documenting information in the medical record, independently interpreting results and communicating that information with the patient/family/caregiver, and care coordination  Follow Up     Return in about 6 months (around 9/11/2024), or if symptoms worsen or fail to improve.  Patient was given instructions and counseling regarding his condition or for health maintenance advice. Please see specific information pulled into the AVS if appropriate.

## 2024-03-12 ENCOUNTER — TELEPHONE (OUTPATIENT)
Dept: GASTROENTEROLOGY | Facility: CLINIC | Age: 59
End: 2024-03-12
Payer: COMMERCIAL

## 2024-03-12 NOTE — TELEPHONE ENCOUNTER
Pt called in, he has appt 3/25 with Romana, but he can't keep any food down and his PCP told him to see if he can get a sooner appt. Romana has a spot today at 145, but he has another appt at that time. He wanted to see if a Nurse could work him in any this week. Please call pt and advise. [phone].

## 2024-03-25 ENCOUNTER — OFFICE VISIT (OUTPATIENT)
Dept: GASTROENTEROLOGY | Facility: CLINIC | Age: 59
End: 2024-03-25
Payer: COMMERCIAL

## 2024-03-25 VITALS
OXYGEN SATURATION: 95 % | HEIGHT: 68 IN | DIASTOLIC BLOOD PRESSURE: 80 MMHG | HEART RATE: 91 BPM | TEMPERATURE: 98.1 F | BODY MASS INDEX: 26.76 KG/M2 | SYSTOLIC BLOOD PRESSURE: 118 MMHG | WEIGHT: 176.6 LBS | RESPIRATION RATE: 16 BRPM

## 2024-03-25 DIAGNOSIS — Z86.010 PERSONAL HISTORY OF COLONIC POLYPS: ICD-10-CM

## 2024-03-25 DIAGNOSIS — Z80.0 FAMILY HISTORY OF COLON CANCER IN FATHER: ICD-10-CM

## 2024-03-25 DIAGNOSIS — K58.0 IRRITABLE BOWEL SYNDROME WITH DIARRHEA: Primary | ICD-10-CM

## 2024-03-25 PROCEDURE — 99214 OFFICE O/P EST MOD 30 MIN: CPT | Performed by: NURSE PRACTITIONER

## 2024-03-25 RX ORDER — OXCARBAZEPINE 300 MG/1
1 TABLET, FILM COATED ORAL
COMMUNITY
Start: 2024-03-15 | End: 2024-06-12

## 2024-03-25 NOTE — PROGRESS NOTES
Chief Complaint   Patient presents with    Irritable Bowel Syndrome       HPI    Barrett Arriaza is a  58 y.o. male here with a family history of colon cancer in his father for a follow up visit for irritable bowel syndrome with diarrhea.    This patient follows with Dr. Celis, new to me.    Past medical history of alcohol use disorder in remission, testicular cancer, schizophrenia, colon polyps, degenerative disc disease along with cervical spondylosis.    He underwent hemorrhoidectomy with Dr. Gilmore last month.    On visit today he cites some abdominal gas and bloating along with cramps.  He has been passing formed stool without diarrhea.  Bowel movements 2-3 times a day.  Does cite postprandial urgency.  No rectal bleeding or rectal pain.  He is MiraLAX for a brief duration following hemorrhoidectomy.  He was prescribed Pamelor following last office visit but never started it for unclear reasons.    No upper GI complaints.    Additional data reviewed:    GES 2022 - normal findings.    C-scope 2022 - Normal ileum, mix HP and TA polyps, diverticulosis, nonbleeding internal hemorrhoids.  Recall 5 years.    EGD 2022 - Medium amount of food in the stomach.  Gastritis.  Pathology was benign.    Past Medical History:   Diagnosis Date    Alcohol use disorder, severe, in early remission 03/09/2023    Stable Length (duration) of remission: 30 DAYS In the event of cravings or relapse, resource reviewed: Alcoholics Anonymous https://www.aa.org/ Follow up Provider: PCP, Medication Assisted Treatment (MAT) Program and Other Follow up in: One month    Alcohol withdrawal syndrome without complication 06/16/2018    Asbestos exposure     Carpal tunnel syndrome of right wrist 03/26/2022    SEEN AT [facility] ER    Cervical radiculopathy 08/2021    Cervical spondylosis     Chest pain 11/11/2020    CARDIAC CATH DONE [facility]  - NORMAL    Chronic pain 08/28/2020    Chronic post-traumatic stress disorder 06/08/2021    Closed head injury  without loss of consciousness 06/11/2023    SEEN AT [facility] ER    Cocaine abuse 01/01/1995    Colitis 09/12/2016    SEEN AT [facility]    Colon polyps     FOLLOWED BY DR. LAILA JEAN-BAPTISTE    Coronary artery vasospasm 11/12/2020    DDD (degenerative disc disease), lumbar     Deep vein thrombosis (DVT) of left lower extremity 02/05/2019    Elevated liver enzymes 10/2021    Excessive daytime sleepiness 12/21/2023    Glaucoma     Hallucinations 10/26/2021    SEEN AT [facility]    Hemorrhoids     Herniated lumbar intervertebral disc 08/2020    History of testicular cancer     Irritable bowel syndrome with diarrhea 12/19/2023    Left varicocele 02/04/2019    SEEN AT [facility]    Lumbar paraspinal muscle spasm 07/2020    Malnutrition 10/2023    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 GARCIA    Rectal bleeding 02/15/2022    SEEN AT [facility] ER    Schizoaffective disorder, bipolar type 02/02/2022    Condition: stable Last mental health visit 3/8/23 [facility] Medications: Taking medications as prescribed If taking medications, do not stop treatment without consulting healthcare provider. If symptoms worsen or do not improve/stabilize, notify health care provider right away. If thoughts of harming s    Snoring 12/18/2023    Sprain of cervical neck 06/2023    Syncope and collapse 02/05/2019       Past Surgical History:   Procedure Laterality Date    ANTERIOR CERVICAL DISCECTOMY W/ FUSION N/A 01/04/2021    C4-5, DR. DUANE DENSLER AT [place]    CARDIAC CATHETERIZATION Left 10/02/2020    DR. RICO MARADIAGA AT [place]    CHOLECYSTECTOMY N/A 02/13/2015    LAPAROSCOPIC WITH IOC, DR. CARLOS CONCEPCION AT [place]    COLONOSCOPY N/A 04/19/2022    FLEXIBLE SIGMOIDOSCOOPYY TO 45 CM,LARGE HEMORRHOIDS, SOLID STOOL IN SIGMOID, RESCOPE IN 6 MONTHS, DR. LAILA JEAN-BAPTISTE AT [facility]    COLONOSCOPY N/A  05/10/2022    5 MM TUBULAR ADENOMA POLYP IN DESCENDING, 4 MM BENIGN POLYP IN SIGMOID, MEDIUM DIVERTICULA IN SIGMOID, LARGE HEMORRHOIDS, DR. LAILA JEAN-BAPTISTE AT [facility]    ENDOSCOPY N/A 04/19/2022    MEDIUM AMOUNT OF FOOD IN GASTRIC ANTRUM, GASTRITIS, DR. LAILA JEAN-BAPTISTE AT [facility]    EYE ENUCLEATION Right     HAND TENDON REPAIR Left 09/2019    AT [facility]    HEMORRHOIDECTOMY N/A 2/8/2024    Procedure: LIGATION OF INTERNAL HEMORROIDS;  Surgeon: Edna Gilmore MD;  Location: [facility] OR;  Service: General;  Laterality: N/A;    NECK SURGERY N/A 2001    SCROTAL SURGERY      WITH SKIN GRAFT FROM  LEFT THIGH    TRANSESOPHAGEAL ECHOCARDIOGRAM (RAFFAELE) N/A 08/28/2020    DR. NEETA CRUZ AT [place]       Scheduled Meds:     Continuous Infusions: No current facility-administered medications for this visit.      PRN Meds:     Allergies   Allergen Reactions    Iodinated Contrast Media Anaphylaxis and Itching    Oxycodone Hives     States he can take Vicodin    Penicillins Hives     Beta lactam allergy details  Antibiotic reaction: hives, rash  Age at reaction: child  Dose to reaction time: (!) minutes  Reason for antibiotic: ear infection  Epinephrine required for reaction?: (!) yes  Tolerated antibiotics: amoxicillin, augmentin, cephalexin        Morphine Swelling and Other (See Comments)     ABDOMINAL PAIN     Oxycodone-Acetaminophen Hives     Per pt can take hydroCODONE        Social History     Socioeconomic History    Marital status: Single   Tobacco Use    Smoking status: Some Days     Current packs/day: 1.00     Average packs/day: 0.8 packs/day for 80.2 years (62.7 ttl pk-yrs)     Types: Cigarettes     Start date: 1/1/1979     Passive exposure: Current    Smokeless tobacco: Never   Vaping Use    Vaping status: Never Used   Substance and Sexual Activity    Alcohol use: Yes     Alcohol/week: 1.0 standard drink of alcohol     Types: 1 Shots of liquor per week    Drug use: Yes     Frequency: 3.0 times per week     Types: Marijuana      Comment: Daily    Sexual activity: Yes     Partners: Female       Family History   Problem Relation Age of Onset    Hypertension Mother     Asthma Mother     Diabetes Mother     Sleep apnea Mother     Cancer Father     Colon cancer Father     Diabetes Sister     Sleep apnea Sister     Hypertension Brother     Asthma Brother     Sleep apnea Brother     Malig Hyperthermia Neg Hx        Review of Systems   HENT:  Negative for trouble swallowing.    Gastrointestinal:         + Gas, bloating and postprandial urgency       Vitals:    03/25/24 0856   BP: 118/80   Pulse: 91   Resp: 16   Temp: 98.1 °F (36.7 °C)   SpO2: 95%       Physical Exam  Constitutional:       Appearance: He is well-developed.   Abdominal:      General: Bowel sounds are normal. There is no distension.      Palpations: Abdomen is soft. There is no mass.      Tenderness: There is no abdominal tenderness. There is no guarding.      Hernia: No hernia is present.   Skin:     General: Skin is warm and dry.      Capillary Refill: Capillary refill takes less than 2 seconds.   Neurological:      Mental Status: He is alert and oriented to person, place, and time.   Psychiatric:         Behavior: Behavior normal.     Assessment    Diagnoses and all orders for this visit:    1. Irritable bowel syndrome with diarrhea (Primary)    2. Personal history of colonic polyps    3. Family history of colon cancer in father    Other orders  -     riFAXIMin (Xifaxan) 550 MG tablet; Take 1 tablet by mouth 3 (Three) Times a Day for 14 days.  Dispense: 42 tablet; Refill: 0    Plan    Regarding IBS-D recommend 14 days of 3 times daily dosing Xifaxan to improve/resolve symptoms  High-fiber diet  Colonoscopy for screening purposes 2027  Follow-up 4 - 6 months         REJI Bello  [facility]  [address] [place] [address]  Office: [phone]

## 2024-05-19 ENCOUNTER — APPOINTMENT (OUTPATIENT)
Dept: GENERAL RADIOLOGY | Facility: HOSPITAL | Age: 59
End: 2024-05-19
Payer: COMMERCIAL

## 2024-05-19 ENCOUNTER — HOSPITAL ENCOUNTER (EMERGENCY)
Facility: HOSPITAL | Age: 59
Discharge: LEFT BEFORE DISCHARGE | End: 2024-05-19
Attending: EMERGENCY MEDICINE | Admitting: EMERGENCY MEDICINE
Payer: COMMERCIAL

## 2024-05-19 VITALS
WEIGHT: 176.59 LBS | OXYGEN SATURATION: 100 % | TEMPERATURE: 97.5 F | RESPIRATION RATE: 11 BRPM | SYSTOLIC BLOOD PRESSURE: 109 MMHG | HEART RATE: 65 BPM | BODY MASS INDEX: 26.76 KG/M2 | DIASTOLIC BLOOD PRESSURE: 87 MMHG | HEIGHT: 68 IN

## 2024-05-19 DIAGNOSIS — Z53.21 ELOPED FROM EMERGENCY DEPARTMENT: Primary | ICD-10-CM

## 2024-05-19 PROCEDURE — 71045 X-RAY EXAM CHEST 1 VIEW: CPT

## 2024-05-19 PROCEDURE — 94640 AIRWAY INHALATION TREATMENT: CPT

## 2024-05-19 PROCEDURE — 94799 UNLISTED PULMONARY SVC/PX: CPT

## 2024-05-19 PROCEDURE — 0202U NFCT DS 22 TRGT SARS-COV-2: CPT | Performed by: PHYSICIAN ASSISTANT

## 2024-05-19 PROCEDURE — 94664 DEMO&/EVAL PT USE INHALER: CPT

## 2024-05-19 PROCEDURE — 99283 EMERGENCY DEPT VISIT LOW MDM: CPT

## 2024-05-19 RX ORDER — IPRATROPIUM BROMIDE AND ALBUTEROL SULFATE 2.5; .5 MG/3ML; MG/3ML
3 SOLUTION RESPIRATORY (INHALATION) ONCE
Status: COMPLETED | OUTPATIENT
Start: 2024-05-19 | End: 2024-05-19

## 2024-05-19 RX ADMIN — IPRATROPIUM BROMIDE AND ALBUTEROL SULFATE 3 ML: .5; 3 SOLUTION RESPIRATORY (INHALATION) at 08:28

## 2024-05-19 NOTE — ED PROVIDER NOTES
EMERGENCY DEPARTMENT ENCOUNTER  Room Number:  40/40  PCP: Coni Laureano APRN  Independent Historians: Patient      HPI:  Chief Complaint: had concerns including Asthma and Cough.       A complete HPI/ROS/PMH/PSH/SH/FH are unobtainable due to: None    Chronic or social conditions impacting patient care (Social Determinants of Health): None      Context: Barrett Arriaza is a 58 y.o. male with a medical history of polysubstance abuse, schizoaffective disorder, bipolar type, prostate cancer who presents to the ED c/o acute cough and congestion.  He states he notices it the most in the morning when he first wake up, last for an hour to and then improves, does not really bother him throughout the day otherwise.  It has been persistent for the last 1 week.  He denies any fevers.  He has an old albuterol inhaler that he has been using but it ran out today.  He states he feels short of breath that first hours of the morning when he is doing a lot of coughing, does not feel short of breath otherwise.  Denies any chest pain or lower extremity edema nasal congestion or rhinorrhea.  No formal diagnosis of lung disease that he is aware of but states he has been smoking about half a pack a day for 44 years.  Also smokes marijuana continuously throughout the day.      Review of prior external notes (non-ED) -and- Review of prior external test results outside of this encounter:  Chest x-ray performed 9/7/2021 due to chest pain showed a normal cardiac silhouette, hyperinflated lungs which were clear and no effusion or pneumothorax, granulomatous disease was present.        PAST MEDICAL HISTORY  Active Ambulatory Problems     Diagnosis Date Noted    Abdominal pain 02/05/2019    Weight loss 02/24/2022    Family history of colon cancer in father 02/24/2022    Nausea 02/24/2022    Prostate cancer 12/18/2023    Onychomycosis 12/18/2023    Alcohol abuse 09/30/2020    Cannabis abuse 01/01/1982    Chronic pain 08/28/2020    Diarrhea  08/04/2017    Deep vein thrombosis (DVT) of left lower extremity 02/05/2019    Coronary artery vasospasm 11/12/2020    Contrast media allergy 02/05/2019    Chronic post-traumatic stress disorder 06/08/2021    Rectal bleeding 08/22/2020    Schizoaffective disorder, bipolar type 02/02/2022    Spondylosis of cervical spine 08/22/2020    Tobacco abuse 02/05/2019    Family history of colon cancer 08/07/2017    Substance abuse 11/11/2020    Snoring 12/18/2023    TYRON (obstructive sleep apnea) 12/18/2023    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    Acute cholecystitis 02/13/2015    Irritable bowel syndrome with diarrhea 12/19/2023    Internal hemorrhoids 12/19/2023    Precordial pain 06/16/2018    Right-sided back pain 08/22/2020    Cocaine abuse 01/01/1995    Alcohol use disorder, severe, in early remission 03/09/2023    Syncope and collapse 02/05/2019    Schizophrenia, paranoid 06/16/2018    Recurrent major depressive episodes, moderate 12/21/2023    Recurrent major depression 12/21/2023    Hospital discharge follow-up 03/09/2023    Gasping for breath 12/21/2023    Excessive daytime sleepiness 12/21/2023    Body mass index (BMI) of 24.0-24.9 in adult 03/09/2023    Alcohol withdrawal syndrome without complication 06/16/2018     Resolved Ambulatory Problems     Diagnosis Date Noted    Colon cancer 12/18/2023     Past Medical History:   Diagnosis Date    Asbestos exposure     Carpal tunnel syndrome of right wrist 03/26/2022    Cervical radiculopathy 08/2021    Cervical spondylosis     Chest pain 11/11/2020    Closed head injury without loss of consciousness 06/11/2023    Colitis 09/12/2016    Colon polyps     DDD (degenerative disc disease), lumbar     Elevated liver enzymes 10/2021    Glaucoma     Hallucinations 10/26/2021    Hemorrhoids     Herniated lumbar intervertebral disc 08/2020    History of testicular cancer     Left varicocele 02/04/2019    Lumbar paraspinal muscle spasm 07/2020    Malnutrition 10/2023    Perianal  dermatitis 12/2022    Presence of artificial eye     Proctitis 06/08/2023    Sprain of cervical neck 06/2023         PAST SURGICAL HISTORY  Past Surgical History:   Procedure Laterality Date    ANTERIOR CERVICAL DISCECTOMY W/ FUSION N/A 01/04/2021    C4-5, DR. DUANE DENSLER AT [place]    CARDIAC CATHETERIZATION Left 10/02/2020    DR. RICO MARADIAGA AT [place]    CHOLECYSTECTOMY N/A 02/13/2015    LAPAROSCOPIC WITH IOC, DR. CARLOS CONCEPCION AT [place]    COLONOSCOPY N/A 04/19/2022    FLEXIBLE SIGMOIDOSCOOPYY TO 45 CM,LARGE HEMORRHOIDS, SOLID STOOL IN SIGMOID, RESCOPE IN 6 MONTHS, DR. LAILA JEAN-BAPTISTE AT [facility]    COLONOSCOPY N/A 05/10/2022    5 MM TUBULAR ADENOMA POLYP IN DESCENDING, 4 MM BENIGN POLYP IN SIGMOID, MEDIUM DIVERTICULA IN SIGMOID, LARGE HEMORRHOIDS, DR. LAILA JEANB-APTISTE AT [facility]    ENDOSCOPY N/A 04/19/2022    MEDIUM AMOUNT OF FOOD IN GASTRIC ANTRUM, GASTRITIS, DR. LAILA JEAN-BAPTISTE AT [facility]    EYE ENUCLEATION Right     HAND TENDON REPAIR Left 09/2019    AT [facility]    HEMORRHOIDECTOMY N/A 2/8/2024    Procedure: LIGATION OF INTERNAL HEMORROIDS;  Surgeon: Edna Gilmore MD;  Location: [facility];  Service: General;  Laterality: N/A;    NECK SURGERY N/A 2001    SCROTAL SURGERY      WITH SKIN GRAFT FROM  LEFT THIGH    TRANSESOPHAGEAL ECHOCARDIOGRAM (RAFFAELE) N/A 08/28/2020    DR. NEETA CRUZ AT [place]         FAMILY HISTORY  Family History   Problem Relation Age of Onset    Hypertension Mother     Asthma Mother     Diabetes Mother     Sleep apnea Mother     Cancer Father     Colon cancer Father     Diabetes Sister     Sleep apnea Sister     Hypertension Brother     Asthma Brother     Sleep apnea Brother     Malig Hyperthermia Neg Hx          SOCIAL HISTORY  Social History     Socioeconomic History    Marital status: Single   Tobacco Use    Smoking status: Some Days     Current packs/day: 1.00     Average packs/day: 0.8 packs/day for 80.4 years (62.9 ttl pk-yrs)     Types: Cigarettes     Start date: 1/1/1979     Passive exposure:  Current    Smokeless tobacco: Never   Vaping Use    Vaping status: Never Used   Substance and Sexual Activity    Alcohol use: Yes     Alcohol/week: 1.0 standard drink of alcohol     Types: 1 Shots of liquor per week    Drug use: Yes     Frequency: 3.0 times per week     Types: Marijuana     Comment: Daily    Sexual activity: Yes     Partners: Female         ALLERGIES  Iodinated contrast media, Oxycodone, Penicillins, Morphine, and Oxycodone-acetaminophen      REVIEW OF SYSTEMS  Review of Systems  Included in HPI  All systems reviewed and negative except for those discussed in HPI.      PHYSICAL EXAM    I have reviewed the triage vital signs and nursing notes.    ED Triage Vitals [05/19/24 0742]   Temp Heart Rate Resp BP SpO2   97.5 °F (36.4 °C) 96 16 -- 97 %      Temp src Heart Rate Source Patient Position BP Location FiO2 (%)   -- -- -- -- --       Physical Exam  GENERAL: alert, well-appearing, no acute distress  SKIN: Warm, dry  HENT: Normocephalic, atraumatic, edentulous  EYES: no scleral icterus  CV: regular rhythm, regular rate, no lower extremity edema  RESPIRATORY: normal effort, scant end expiratory wheeze, no rhonchi or crackles  ABDOMEN: nondistended soft nontender  MUSCULOSKELETAL: no deformity  NEURO: alert, moves all extremities, follows commands            LAB RESULTS  Recent Results (from the past 24 hour(s))   Respiratory Panel PCR w/COVID-19(SARS-CoV-2) TSERING/ZACHARY/WADE/PAD/COR/DONNA In-House, NP Swab in UTM/VTM, 2 HR TAT - Swab, Nasopharynx    Collection Time: 05/19/24  8:16 AM    Specimen: Nasopharynx; Swab   Result Value Ref Range    ADENOVIRUS, PCR Not Detected Not Detected    Coronavirus 229E Not Detected Not Detected    Coronavirus HKU1 Not Detected Not Detected    Coronavirus NL63 Not Detected Not Detected    Coronavirus OC43 Not Detected Not Detected    COVID19 Not Detected Not Detected - Ref. Range    Human Metapneumovirus Not Detected Not Detected    Human Rhinovirus/Enterovirus Not Detected Not  Detected    Influenza A PCR Not Detected Not Detected    Influenza B PCR Not Detected Not Detected    Parainfluenza Virus 1 Not Detected Not Detected    Parainfluenza Virus 2 Not Detected Not Detected    Parainfluenza Virus 3 Not Detected Not Detected    Parainfluenza Virus 4 Not Detected Not Detected    RSV, PCR Not Detected Not Detected    Bordetella pertussis pcr Not Detected Not Detected    Bordetella parapertussis PCR Not Detected Not Detected    Chlamydophila pneumoniae PCR Not Detected Not Detected    Mycoplasma pneumo by PCR Not Detected Not Detected         RADIOLOGY  XR Chest 1 View    Result Date: 5/19/2024  XR CHEST 1 VW-  HISTORY: Male who is 58 years-old, cough  TECHNIQUE: Frontal view of the chest  COMPARISON: None available  FINDINGS: Heart, mediastinum and pulmonary vasculature are unremarkable. No focal pulmonary consolidation, pleural effusion, or pneumothorax. Old granulomatous disease is seen. No acute osseous process.      No focal pulmonary consolidation. Follow-up as clinical indications persist.  This report was finalized on 5/19/2024 8:36 AM by Dr. Jim Suarez M.D on Workstation: [id number]         MEDICATIONS GIVEN IN ER  Medications   ipratropium-albuterol (DUO-NEB) nebulizer solution 3 mL (3 mL Nebulization Given 5/19/24 0828)         ORDERS PLACED DURING THIS VISIT:  Orders Placed This Encounter   Procedures    Respiratory Panel PCR w/COVID-19(SARS-CoV-2) TSERING/ZACHARY/WADE/PAD/COR/DONNA In-House, NP Swab in UTM/VTM, 2 HR TAT - Swab, Nasopharynx    XR Chest 1 View         OUTPATIENT MEDICATION MANAGEMENT:  No current Epic-ordered facility-administered medications on file.     Current Outpatient Medications Ordered in Epic   Medication Sig Dispense Refill    aspirin 81 MG EC tablet Take 1 tablet by mouth Daily. HELD FOR OR      diphenhydrAMINE (BENADRYL) 25 mg capsule Take 1 capsule by mouth Every 6 (Six) Hours As Needed for Allergies.      naloxone (NARCAN) 4 MG/0.1ML nasal spray Call 911.  Don't prime. Spray in 1 nostril for overdose. Repeat in 2-3 minutes in other nostril if no or minimal breathing/responsiveness. 2 each 0    Nutritional Supplements (Boost High Protein) liquid Take 240 mL by mouth.      OXcarbazepine (TRILEPTAL) 300 MG tablet Take 1 tablet by mouth.      QUEtiapine XR (SEROquel XR) 400 MG 24 hr tablet Take 1 tablet by mouth Every Night.           PROCEDURES  Procedures            PROGRESS, DATA ANALYSIS, CONSULTS, AND MEDICAL DECISION MAKING  All labs have been independently interpreted by me.  All radiology studies have been reviewed by me. All EKG's have been independently viewed and interpreted by me.  Discussion below represents my analysis of pertinent findings related to patient's condition, differential diagnosis, treatment plan and final disposition.    DIFFERENTIAL    Differential diagnosis includes but is not limited to pneumonia, COPD, asthma, seasonal allergies, viral URI      ED Course as of 05/19/24 1501   Sun May 19, 2024   0844 My depend interpretation of the chest x-ray is no cardiomegaly or acute infiltrate. [KA]   0924 COVID19: Not Detected [KA]   0924 Parainfluenza Virus 1: Not Detected [KA]   0925 Influenza A PCR: Not Detected [KA]   0925 Influenza B PCR: Not Detected [KA]   0925 Human Metapneumovirus: Not Detected [KA]      ED Course User Index  [KA] Aidee Culp PA-C     The patient eloped from the emergency department before we could discuss his results or any formal treatment or follow-up.        AS OF 15:01 EDT VITALS:    BP - 109/87  HR - 65  TEMP - 97.5 °F (36.4 °C)  O2 SATS - 100%    COMPLEXITY OF CARE  Admission was considered but after careful review of the patient's presentation, physical examination, diagnostic results, and response to treatment the patient may be safely discharged with outpatient follow-up.      DIAGNOSIS  Final diagnoses:   Eloped from emergency department         DISPOSITION  ED Disposition       ED Disposition   Eloped     Condition   --    Comment   --                  FOLLOW UP  No follow-up provider specified.            Please note that portions of this document were completed with a voice recognition program.    Note Disclaimer: At [facility], we believe that sharing information builds trust and better relationships. You are receiving this note because you recently visited [facility]. It is possible you will see health information before a provider has talked with you about it. This kind of information can be easy to misunderstand. To help you fully understand what it means for your health, we urge you to discuss this note with your provider.         Aidee Culp PA-C  05/19/24 5185

## 2024-05-19 NOTE — ED NOTES
Patient walked up to the nurses station and stated he was leaving. Ask if I could get him something or do something for him. He went back into room and then left the ER

## 2024-07-30 ENCOUNTER — NURSE TRIAGE (OUTPATIENT)
Dept: CALL CENTER | Facility: HOSPITAL | Age: 59
End: 2024-07-30
Payer: COMMERCIAL

## 2024-07-30 ENCOUNTER — HOSPITAL ENCOUNTER (OUTPATIENT)
Facility: HOSPITAL | Age: 59
Discharge: HOME | End: 2024-07-30
Payer: COMMERCIAL

## 2024-07-30 ENCOUNTER — OFFICE VISIT (OUTPATIENT)
Dept: INTERNAL MEDICINE | Facility: CLINIC | Age: 59
End: 2024-07-30
Payer: COMMERCIAL

## 2024-07-30 ENCOUNTER — OFFICE VISIT (OUTPATIENT)
Dept: GASTROENTEROLOGY | Facility: CLINIC | Age: 59
End: 2024-07-30
Payer: COMMERCIAL

## 2024-07-30 VITALS
HEART RATE: 93 BPM | DIASTOLIC BLOOD PRESSURE: 76 MMHG | BODY MASS INDEX: 23.64 KG/M2 | OXYGEN SATURATION: 95 % | HEIGHT: 68 IN | WEIGHT: 156 LBS | TEMPERATURE: 97.7 F | SYSTOLIC BLOOD PRESSURE: 112 MMHG

## 2024-07-30 VITALS
HEART RATE: 73 BPM | DIASTOLIC BLOOD PRESSURE: 66 MMHG | BODY MASS INDEX: 23.64 KG/M2 | WEIGHT: 156 LBS | SYSTOLIC BLOOD PRESSURE: 100 MMHG | TEMPERATURE: 97.8 F | HEIGHT: 68 IN

## 2024-07-30 DIAGNOSIS — T17.320A CHOKING DUE TO FOOD IN LARYNX, INITIAL ENCOUNTER: ICD-10-CM

## 2024-07-30 DIAGNOSIS — K58.0 IRRITABLE BOWEL SYNDROME WITH DIARRHEA: Primary | ICD-10-CM

## 2024-07-30 DIAGNOSIS — R05.3 CHRONIC COUGH: Primary | Chronic | ICD-10-CM
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
## 2024-07-30 PROBLEM — C61 MALIGNANT TUMOR OF PROSTATE: Status: ACTIVE | Noted: 2024-07-30

## 2024-07-30 PROBLEM — M19.079 ARTHRITIS OF JOINT OF TOE: Status: ACTIVE | Noted: 2024-07-30

## 2024-07-30 PROBLEM — C62.90 MALIGNANT TUMOR OF TESTIS: Status: ACTIVE | Noted: 2024-07-30

## 2024-07-30 PROBLEM — G56.00 CARPAL TUNNEL SYNDROME: Status: ACTIVE | Noted: 2024-07-30

## 2024-07-30 PROCEDURE — 1160F RVW MEDS BY RX/DR IN RCRD: CPT | Performed by: INTERNAL MEDICINE

## 2024-07-30 PROCEDURE — 99212 OFFICE O/P EST SF 10 MIN: CPT | Performed by: INTERNAL MEDICINE

## 2024-07-30 PROCEDURE — 71046 X-RAY EXAM CHEST 2 VIEWS: CPT

## 2024-07-30 PROCEDURE — 1125F AMNT PAIN NOTED PAIN PRSNT: CPT

## 2024-07-30 PROCEDURE — 99213 OFFICE O/P EST LOW 20 MIN: CPT

## 2024-07-30 PROCEDURE — 1159F MED LIST DOCD IN RCRD: CPT | Performed by: INTERNAL MEDICINE

## 2024-07-30 RX ORDER — BUDESONIDE AND FORMOTEROL FUMARATE DIHYDRATE 80; 4.5 UG/1; UG/1
2 AEROSOL RESPIRATORY (INHALATION)
Qty: 10.2 G | Refills: 12 | Status: SHIPPED | OUTPATIENT
Start: 2024-07-30

## 2024-07-30 RX ORDER — CITALOPRAM HYDROBROMIDE 10 MG/1
10 TABLET ORAL DAILY
COMMUNITY

## 2024-07-30 RX ORDER — ALBUTEROL SULFATE 90 UG/1
2 AEROSOL, METERED RESPIRATORY (INHALATION) EVERY 4 HOURS PRN
Qty: 18 G | Refills: 4 | Status: SHIPPED | OUTPATIENT
Start: 2024-07-30

## 2024-07-30 RX ORDER — FLUTICASONE PROPIONATE 50 MCG
2 SPRAY, SUSPENSION (ML) NASAL DAILY
COMMUNITY

## 2024-07-30 NOTE — PROGRESS NOTES
Chief Complaint   Patient presents with   • Irritable Bowel Syndrome       Barrett Arriaza is a  58 y.o. male here for a follow up visit for IBS.    HPI    Patient 58-year-old male history of schizoaffective disorder, herniated lumbar disc, degenerative disc disease here for follow-up of his IBS-D.  Patient last visit given Xifaxan for his IBS-D reports complete resolution of his GI symptoms.  Patient denies any diarrhea no urgency.  Patient denies abdominal pain or gas with distention.    Past Medical History:   Diagnosis Date   • AAA (abdominal aortic aneurysm)    • Alcohol use disorder, severe, in early remission 03/09/2023    Stable Length (duration) of remission: 30 DAYS In the event of cravings or relapse, resource reviewed: Alcoholics Anonymous https://www.aa.org/ Follow up Provider: PCP, Medication Assisted Treatment (MAT) Program and Other Follow up in: One month   • Alcohol withdrawal syndrome without complication 06/16/2018   • Asbestos exposure    • Carpal tunnel syndrome of right wrist 03/26/2022    SEEN AT [facility] ER   • Cervical radiculopathy 08/2021   • Cervical spondylosis    • Chest pain 11/11/2020    CARDIAC CATH DONE [facility]  - NORMAL   • Chronic pain 08/28/2020   • Chronic post-traumatic stress disorder 06/08/2021   • Closed head injury without loss of consciousness 06/11/2023    SEEN AT [facility]   • Cocaine abuse 01/01/1995   • Colitis 09/12/2016    SEEN AT [facility]   • Colon polyps     FOLLOWED BY DR. LAILA JEAN-BAPTISTE   • Coronary artery vasospasm 11/12/2020   • DDD (degenerative disc disease), lumbar    • Deep vein thrombosis (DVT) of left lower extremity 02/05/2019   • Elevated liver enzymes 10/2021   • Excessive daytime sleepiness 12/21/2023   • Glaucoma    • Hallucinations 10/26/2021    SEEN AT [facility]   • Hemorrhoids    • Herniated lumbar intervertebral disc 08/2020   • History of testicular cancer    • Irritable bowel syndrome with diarrhea 12/19/2023   • Left varicocele 02/04/2019    SEEN  AT [place] [facility]   • Lumbar paraspinal muscle spasm 07/2020   • Malnutrition 10/2023   • 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 GARCIA   • Rectal bleeding 02/15/2022    SEEN AT [facility] ER   • Schizoaffective disorder, bipolar type 02/02/2022    Condition: stable Last mental health visit 3/8/23 [facility] Medications: Taking medications as prescribed If taking medications, do not stop treatment without consulting healthcare provider. If symptoms worsen or do not improve/stabilize, notify health care provider right away. If thoughts of harming s   • Snoring 12/18/2023   • Sprain of cervical neck 06/2023   • Syncope and collapse 02/05/2019         Current Outpatient Medications:   •  aspirin 81 MG EC tablet, Take 1 tablet by mouth Daily. HELD FOR OR, Disp: , Rfl:   •  diphenhydrAMINE (BENADRYL) 25 mg capsule, Take 1 capsule by mouth Every 6 (Six) Hours As Needed for Allergies., Disp: , Rfl:   •  naloxone (NARCAN) 4 MG/0.1ML nasal spray, Call 911. Don't prime. [place] in 1 nostril for overdose. Repeat in 2-3 minutes in other nostril if no or minimal breathing/responsiveness., Disp: 2 each, Rfl: 0  •  Nutritional Supplements (Boost High Protein) liquid, Take 240 mL by mouth., Disp: , Rfl:   •  QUEtiapine XR (SEROquel XR) 400 MG 24 hr tablet, Take 1 tablet by mouth Every Night., Disp: , Rfl:     Allergies   Allergen Reactions   • Iodinated Contrast Media Anaphylaxis and Itching   • Oxycodone Hives     States he can take Vicodin   • Penicillins Hives     Beta lactam allergy details  Antibiotic reaction: hives, rash  Age at reaction: child  Dose to reaction time: (!) minutes  Reason for antibiotic: ear infection  Epinephrine required for reaction?: (!) yes  Tolerated antibiotics: amoxicillin, augmentin, cephalexin       • Morphine Swelling and Other (See  Comments)     ABDOMINAL PAIN    • Oxycodone-Acetaminophen Hives     Per pt can take hydroCODONE        Social History     Socioeconomic History   • Marital status: Single   Tobacco Use   • Smoking status: Some Days     Current packs/day: 1.00     Average packs/day: 0.8 packs/day for 97.7 years (80.2 ttl pk-yrs)     Types: Cigarettes     Start date: 1/1/1979     Passive exposure: Current   • Smokeless tobacco: Never   Vaping Use   • Vaping status: Never Used   Substance and Sexual Activity   • Alcohol use: Yes     Alcohol/week: 1.0 standard drink of alcohol     Types: 1 Shots of liquor per week   • Drug use: Yes     Frequency: 3.0 times per week     Types: Marijuana     Comment: Daily   • Sexual activity: Yes     Partners: Female     Birth control/protection: None       Family History   Problem Relation Age of Onset   • Hypertension Mother    • Asthma Mother    • Diabetes Mother    • Sleep apnea Mother    • Cancer Father    • Colon cancer Father    • Diabetes Sister    • Sleep apnea Sister    • Hypertension Brother    • Asthma Brother    • Sleep apnea Brother    • Malig Hyperthermia Neg Hx        Review of Systems   Constitutional: Negative.    Respiratory: Negative.     Cardiovascular: Negative.    Gastrointestinal: Negative.    Musculoskeletal:  Positive for back pain. Negative for arthralgias and gait problem.   Skin: Negative.    Hematological: Negative.      Vitals:    07/30/24 0906   BP: 100/66   Pulse: 73   Temp: 97.8 °F (36.6 °C)       Physical Exam  Vitals reviewed.   Constitutional:       Appearance: Normal appearance. He is well-developed and normal weight.   HENT:      Head: Normocephalic and atraumatic.      Mouth/Throat:      Mouth: Mucous membranes are moist.   Eyes:      General: No scleral icterus.     Pupils: Pupils are equal, round, and reactive to light.   Cardiovascular:      Rate and Rhythm: Regular rhythm.   Pulmonary:      Effort: Pulmonary effort is normal. No respiratory distress.       Breath sounds: Normal breath sounds.   Abdominal:      General: Bowel sounds are normal. There is no distension.      Palpations: Abdomen is soft.      Tenderness: There is no abdominal tenderness.   Skin:     General: Skin is warm and dry.      Coloration: Skin is not jaundiced.      Findings: No rash.   Neurological:      General: No focal deficit present.      Mental Status: He is alert and oriented to person, place, and time.      Cranial Nerves: No cranial nerve deficit.   Psychiatric:         Mood and Affect: Mood normal.         Behavior: Behavior normal.         Thought Content: Thought content normal.     No visits with results within 2 Month(s) from this visit.   Latest known visit with results is:   Admission on 05/19/2024, Discharged on 05/19/2024   Component Date Value Ref Range Status   • ADENOVIRUS, PCR 05/19/2024 Not Detected  Not Detected Final   • Coronavirus 229E 05/19/2024 Not Detected  Not Detected Final   • Coronavirus HKU1 05/19/2024 Not Detected  Not Detected Final   • Coronavirus NL63 05/19/2024 Not Detected  Not Detected Final   • Coronavirus OC43 05/19/2024 Not Detected  Not Detected Final   • COVID19 05/19/2024 Not Detected  Not Detected - Ref. Range Final   • Human Metapneumovirus 05/19/2024 Not Detected  Not Detected Final   • Human Rhinovirus/Enterovirus 05/19/2024 Not Detected  Not Detected Final   • Influenza A PCR 05/19/2024 Not Detected  Not Detected Final   • Influenza B PCR 05/19/2024 Not Detected  Not Detected Final   • Parainfluenza Virus 1 05/19/2024 Not Detected  Not Detected Final   • Parainfluenza Virus 2 05/19/2024 Not Detected  Not Detected Final   • Parainfluenza Virus 3 05/19/2024 Not Detected  Not Detected Final   • Parainfluenza Virus 4 05/19/2024 Not Detected  Not Detected Final   • RSV, PCR 05/19/2024 Not Detected  Not Detected Final   • Bordetella pertussis pcr 05/19/2024 Not Detected  Not Detected Final   • Bordetella parapertussis PCR 05/19/2024 Not Detected  Not  Detected Final   • Chlamydophila pneumoniae PCR 05/19/2024 Not Detected  Not Detected Final   • Mycoplasma pneumo by PCR 05/19/2024 Not Detected  Not Detected Final       Diagnoses and all orders for this visit:    1. Irritable bowel syndrome with diarrhea (Primary)      Patient 58-year-old male history of herniated lumbar disks, schizoaffective disorder, alcohol use and IBS-D here for follow-up.  Patient reports complete resolution of his GI symptoms after Xifaxan use.  Patient currently with no urgency no further diarrhea denies abdominal pain.  His hemorrhoids have resolved since surgery and patient feeling well.  Discussed with patient that symptoms may return at some point in the future to call as needed otherwise we will have yearly visit, patient told to contact us early next year to discuss GI follow-up next July.

## 2024-07-30 NOTE — TELEPHONE ENCOUNTER
Reason for Disposition   Cough has been present for > 3 weeks    Additional Information   Negative: SEVERE difficulty breathing (e.g., struggling for each breath, speaks in single words)   Negative: Bluish (or gray) lips or face now   Negative: [1] Rapid onset of cough AND [2] has hives   Negative: Coughing started suddenly after medicine, an allergic food or bee sting   Negative: [1] Difficulty breathing AND [2] exposure to flames, smoke, or fumes   Negative: [1] Stridor AND [2] difficulty breathing   Negative: Sounds like a life-threatening emergency to the triager   Negative: Choked on object of food that could be caught in the throat   Negative: Chest pain is main symptom   Negative: [1] Previous asthma attacks AND [2] this feels like asthma attack   Negative: Cough lasts > 3 weeks   Negative: Wet cough (productive; white-yellow, yellow, green, or angela colored sputum)   Negative: [1] Dry cough (non-productive;  no sputum or minimal clear sputum) AND [2] within 14 days of COVID-19 Exposure   Negative: [1] MODERATE difficulty breathing (e.g., speaks in phrases, SOB even at rest, pulse 100-120) AND [2] still present when not coughing   Negative: Chest pain  (Exception: MILD central chest pain, present only when coughing.)   Negative: Patient sounds very sick or weak to the triager   Negative: [1] MILD difficulty breathing (e.g., minimal/no SOB at rest, SOB with walking, pulse <100) AND [2] still present when not coughing   Negative: [1] Coughed up blood AND [2] > 1 tablespoon (15 ml)   (Exception: Blood-tinged sputum.)   Negative: Fever > 103 F (39.4 C)   Negative: [1] Fever > 101 F (38.3 C) AND [2] age > 60 years   Negative: [1] Fever > 100.0 F (37.8 C) AND [2] bedridden (e.g., CVA, chronic illness, recovering from surgery)   Negative: [1] Fever > 100.0 F (37.8 C) AND [2] diabetes mellitus or weak immune system (e.g., HIV positive, cancer chemo, splenectomy, organ transplant, chronic steroids)   Negative:  "Wheezing is present   Negative: [1] Ankle swelling AND [2] swelling is increasing   Negative: SEVERE coughing spells (e.g., whooping sound after coughing, vomiting after coughing)   Negative: [1] Continuous (nonstop) coughing interferes with work or school AND [2] no improvement using cough treatment per Care Advice   Negative: Fever present > 3 days (72 hours)   Negative: [1] Fever returns after gone for over 24 hours AND [2] symptoms worse or not improved   Negative: [1] Using nasal washes and pain medicine > 24 hours AND [2] sinus pain (around cheekbone or eye) persists   Negative: Earache is present    Answer Assessment - Initial Assessment Questions  1. ONSET: \"When did the cough begin?\"       6-7 months  2. SEVERITY: \"How bad is the cough today?\"       Bad, not coughing anything   3. SPUTUM: \"Describe the color of your sputum\" (none, dry cough; clear, white, yellow, green)      None   4. HEMOPTYSIS: \"Are you coughing up any blood?\" If so ask: \"How much?\" (flecks, streaks, tablespoons, etc.)      denies  5. DIFFICULTY BREATHING: \"Are you having difficulty breathing?\" If Yes, ask: \"How bad is it?\" (e.g., mild, moderate, severe)     - MILD: No SOB at rest, mild SOB with walking, speaks normally in sentences, can lie down, no retractions, pulse < 100.     - MODERATE: SOB at rest, SOB with minimal exertion and prefers to sit, cannot lie down flat, speaks in phrases, mild retractions, audible wheezing, pulse 100-120.     - SEVERE: Very SOB at rest, speaks in single words, struggling to breathe, sitting hunched forward, retractions, pulse > 120       mild  6. FEVER: \"Do you have a fever?\" If Yes, ask: \"What is your temperature, how was it measured, and when did it start?\"      denies  7. CARDIAC HISTORY: \"Do you have any history of heart disease?\" (e.g., heart attack, congestive heart failure)       dneies  8. LUNG HISTORY: \"Do you have any history of lung disease?\"  (e.g., pulmonary embolus, asthma, emphysema)      " "Use to use inhalers  9. PE RISK FACTORS: \"Do you have a history of blood clots?\" (or: recent major surgery, recent prolonged travel, bedridden)      na  10. OTHER SYMPTOMS: \"Do you have any other symptoms?\" (e.g., runny nose, wheezing, chest pain)        Slight wheeze in am  11. PREGNANCY: \"Is there any chance you are pregnant?\" \"When was your last menstrual period?\"        na  12. TRAVEL: \"Have you traveled out of the country in the last month?\" (e.g., travel history, exposures)        na    Protocols used: Cough - Acute Non-Productive-ADULT-AH    "

## 2024-07-30 NOTE — PROGRESS NOTES
"Chief Complaint  Cough (X6-7 months ) and Neck Pain (Neck pain following surgery, treatment options )    Subjective        Barrett Arriaza presents to [facility]  History of Present Illness  Mr Arriaza is here today stating Its hard for him to get words out and is worse when he is eating and drinking. He is not coughing up productive sputum. He used to use inhalers but has misplaced it. The inhaler he had worked well for his wheezing. He states he coughing every time he eats. He states when he eats it is going down the wrong way. He states it is not every time he eats or drinks but is intermittent .  Mr. Arriaza states he is wheezing when he is laying down at night.  He has been told he has asthma in the past however there is no documented history.  Ambulatory referral to pulmonology for asthma workup.  Chest x-ray today for possible aspiration/pneumonia.  Mr. Arriaza is an active smoker and does not have the desire to quit today.    Objective   Vital Signs:  /76 (BP Location: Left arm, Patient Position: Sitting, Cuff Size: Adult)   Pulse 93   Temp 97.7 °F (36.5 °C) (Temporal)   Ht 172.7 cm (67.99\")   Wt 70.8 kg (156 lb)   SpO2 95%   BMI 23.73 kg/m²   Estimated body mass index is 23.73 kg/m² as calculated from the following:    Height as of this encounter: 172.7 cm (67.99\").    Weight as of this encounter: 70.8 kg (156 lb).       BMI is within normal parameters. No other follow-up for BMI required.      Physical Exam  Vitals reviewed.   Constitutional:       General: He is awake.      Appearance: Normal appearance.   HENT:      Head: Normocephalic.      Right Ear: Hearing normal.      Left Ear: Hearing normal.      Nose: Nose normal.      Mouth/Throat:      Lips: Pink.      Mouth: Mucous membranes are moist.      Palate: No mass.      Pharynx: Oropharynx is clear. Uvula midline.   Cardiovascular:      Rate and Rhythm: Normal rate and regular rhythm.      Pulses: " Normal pulses.      Heart sounds: Normal heart sounds.   Pulmonary:      Effort: Pulmonary effort is normal.      Breath sounds: Decreased air movement present. Examination of the left-middle field reveals rhonchi. Rhonchi present. No wheezing or rales.   Abdominal:      General: Abdomen is flat. Bowel sounds are normal.      Palpations: Abdomen is soft.   Musculoskeletal:      Right lower leg: No edema.      Left lower leg: No edema.   Lymphadenopathy:      Cervical: No cervical adenopathy.   Skin:     General: Skin is warm and dry.      Capillary Refill: Capillary refill takes less than 2 seconds.   Neurological:      General: No focal deficit present.      Mental Status: He is alert and oriented to person, place, and time. Mental status is at baseline.   Psychiatric:         Attention and Perception: Attention and perception normal.         Mood and Affect: Mood is elated.         Speech: Speech is rapid and pressured.      Comments: Mr Arriaza is mildly argumentative regarding his previous appointments and is very vocal about         Result Review :    The following data was reviewed by: REJI Manuel on 07/30/2024:      XR Chest 2 View (07/30/2024 3:40 PM)            Assessment and Plan     Diagnoses and all orders for this visit:    1. Chronic cough (Primary)  -     XR Chest 2 View  -     Ambulatory Referral to Pulmonology  -     albuterol sulfate  (90 Base) MCG/ACT inhaler; Inhale 2 puffs Every 4 (Four) Hours As Needed for Wheezing.  Dispense: 18 g; Refill: 4  -     budesonide-formoterol (Symbicort) 80-4.5 MCG/ACT inhaler; Inhale 2 puffs 2 (Two) Times a Day.  Dispense: 10.2 g; Refill: 12  -     FL Video Swallow With Speech; Future    2. Choking due to food in larynx, initial encounter  -     FL Video Swallow With Speech; Future    Please follow up with pulmonology for chronic cough. Please complete chest Xray today to evaluate for aspiration pneumonia. Please use inhalers as directed.      Thank you for allowing us to care for you,  REJI Manuel            Follow Up     No follow-ups on file.  Patient was given instructions and counseling regarding his condition or for health maintenance advice. Please see specific information pulled into the AVS if appropriate.

## 2024-08-01 NOTE — PROGRESS NOTES
Mr. Arriaza,  I have reviewed the results of your chest x-ray, there is no active disease or congestion visible in your chest x-ray.  Due to your chronic cough and choking while eating please follow-up with a swallow study and with pulmonology to be evaluated for COPD versus asthma.  Thank you for allowing us to care for you,  REJI Manuel

## 2024-09-19 ENCOUNTER — OFFICE VISIT (OUTPATIENT)
Dept: INTERNAL MEDICINE | Facility: CLINIC | Age: 59
End: 2024-09-19
Payer: COMMERCIAL

## 2024-09-19 VITALS
TEMPERATURE: 97.1 F | OXYGEN SATURATION: 97 % | SYSTOLIC BLOOD PRESSURE: 116 MMHG | HEIGHT: 68 IN | HEART RATE: 82 BPM | BODY MASS INDEX: 25.61 KG/M2 | DIASTOLIC BLOOD PRESSURE: 80 MMHG | WEIGHT: 169 LBS

## 2024-09-19 DIAGNOSIS — Z11.59 NEED FOR HEPATITIS C SCREENING TEST: ICD-10-CM

## 2024-09-19 DIAGNOSIS — Z71.6 TOBACCO ABUSE COUNSELING: ICD-10-CM

## 2024-09-19 DIAGNOSIS — F17.210 NICOTINE DEPENDENCE, CIGARETTES, UNCOMPLICATED: Chronic | ICD-10-CM

## 2024-09-19 DIAGNOSIS — S99.921A INJURY OF TOENAIL OF RIGHT FOOT, INITIAL ENCOUNTER: ICD-10-CM

## 2024-09-19 DIAGNOSIS — B35.3 TINEA PEDIS OF BOTH FEET: Chronic | ICD-10-CM

## 2024-09-19 DIAGNOSIS — Z00.00 ANNUAL PHYSICAL EXAM: Primary | ICD-10-CM

## 2024-09-19 DIAGNOSIS — Z87.891 PERSONAL HISTORY OF TOBACCO USE, PRESENTING HAZARDS TO HEALTH: ICD-10-CM

## 2024-09-19 RX ORDER — PRAZOSIN HYDROCHLORIDE 1 MG/1
1 CAPSULE ORAL NIGHTLY
Qty: 90 CAPSULE | Refills: 0 | Status: SHIPPED | OUTPATIENT
Start: 2024-09-19

## 2024-09-19 RX ORDER — NICOTINE 21 MG/24HR
1 PATCH, TRANSDERMAL 24 HOURS TRANSDERMAL EVERY 24 HOURS
Qty: 30 PATCH | Refills: 1 | Status: SHIPPED | OUTPATIENT
Start: 2024-09-19

## 2024-09-19 RX ORDER — PRAZOSIN HYDROCHLORIDE 1 MG/1
CAPSULE ORAL
COMMUNITY
Start: 2024-08-23 | End: 2024-09-19 | Stop reason: SDUPTHER

## 2024-09-19 RX ORDER — DOCUSATE SODIUM 100 MG/1
CAPSULE, LIQUID FILLED ORAL
COMMUNITY
Start: 2024-08-23

## 2024-09-19 RX ORDER — OLANZAPINE 10 MG/1
TABLET ORAL
COMMUNITY
Start: 2024-08-23

## 2024-09-19 RX ORDER — GABAPENTIN 100 MG/1
CAPSULE ORAL
COMMUNITY
Start: 2024-08-23

## 2024-09-19 RX ORDER — QUETIAPINE FUMARATE 200 MG/1
200 TABLET, FILM COATED ORAL NIGHTLY
COMMUNITY
Start: 2024-08-23

## 2024-09-19 RX ORDER — PRENATAL VIT 91/IRON/FOLIC/DHA 28-975-200
1 COMBINATION PACKAGE (EA) ORAL 2 TIMES DAILY
Qty: 42 G | Refills: 0 | Status: SHIPPED | OUTPATIENT
Start: 2024-09-19

## 2024-09-24 ENCOUNTER — TRANSCRIBE ORDERS (OUTPATIENT)
Dept: ADMINISTRATIVE | Facility: HOSPITAL | Age: 59
End: 2024-09-24
Payer: COMMERCIAL

## 2024-09-24 DIAGNOSIS — C61 MALIGNANT NEOPLASM OF PROSTATE: Primary | ICD-10-CM

## 2024-09-25 ENCOUNTER — TRANSCRIBE ORDERS (OUTPATIENT)
Dept: CARDIOLOGY | Facility: HOSPITAL | Age: 59
End: 2024-09-25
Payer: COMMERCIAL

## 2024-09-25 ENCOUNTER — TRANSCRIBE ORDERS (OUTPATIENT)
Dept: ADMINISTRATIVE | Facility: HOSPITAL | Age: 59
End: 2024-09-25
Payer: COMMERCIAL

## 2024-09-25 ENCOUNTER — HOSPITAL ENCOUNTER (OUTPATIENT)
Dept: CARDIOLOGY | Facility: HOSPITAL | Age: 59
Discharge: HOME | End: 2024-09-25
Payer: COMMERCIAL

## 2024-09-25 ENCOUNTER — HOSPITAL ENCOUNTER (OUTPATIENT)
Facility: HOSPITAL | Age: 59
Discharge: HOME | End: 2024-09-25
Payer: COMMERCIAL

## 2024-09-25 DIAGNOSIS — N40.1 ENLARGED PROSTATE WITH URINARY OBSTRUCTION: Primary | ICD-10-CM

## 2024-09-25 DIAGNOSIS — D72.10 EOSINOPHILIA, UNSPECIFIED TYPE: ICD-10-CM

## 2024-09-25 DIAGNOSIS — C61 MALIGNANT NEOPLASM OF PROSTATE: ICD-10-CM

## 2024-09-25 DIAGNOSIS — N13.8 ENLARGED PROSTATE WITH URINARY OBSTRUCTION: Primary | ICD-10-CM

## 2024-09-25 DIAGNOSIS — R05.3 CHRONIC COUGH: Primary | ICD-10-CM

## 2024-09-25 DIAGNOSIS — C61 MALIGNANT NEOPLASM OF PROSTATE: Primary | ICD-10-CM

## 2024-09-25 PROCEDURE — A9577 INJ MULTIHANCE: HCPCS | Performed by: INTERNAL MEDICINE

## 2024-09-25 PROCEDURE — 72197 MRI PELVIS W/O & W/DYE: CPT

## 2024-09-25 PROCEDURE — 93005 ELECTROCARDIOGRAM TRACING: CPT

## 2024-09-25 PROCEDURE — 0 GADOBENATE DIMEGLUMINE 529 MG/ML SOLUTION: Performed by: INTERNAL MEDICINE

## 2024-09-25 RX ADMIN — GADOBENATE DIMEGLUMINE 15 ML: 529 INJECTION, SOLUTION INTRAVENOUS at 19:18

## 2024-09-26 ENCOUNTER — TELEPHONE (OUTPATIENT)
Dept: INTERNAL MEDICINE | Facility: CLINIC | Age: 59
End: 2024-09-26
Payer: COMMERCIAL

## 2024-09-26 LAB
QT INTERVAL: 369 MS
QTC INTERVAL: 410 MS

## 2024-09-27 ENCOUNTER — LAB (OUTPATIENT)
Facility: HOSPITAL | Age: 59
End: 2024-09-27
Payer: COMMERCIAL

## 2024-09-27 LAB
ALBUMIN SERPL-MCNC: 4.2 G/DL (ref 3.5–5.2)
ALBUMIN/GLOB SERPL: 1.5 G/DL
ALP SERPL-CCNC: 113 U/L (ref 39–117)
ALT SERPL W P-5'-P-CCNC: 35 U/L (ref 1–41)
ANION GAP SERPL CALCULATED.3IONS-SCNC: 9.7 MMOL/L (ref 5–15)
AST SERPL-CCNC: 23 U/L (ref 1–40)
BASOPHILS # BLD AUTO: 0.04 10*3/MM3 (ref 0–0.2)
BASOPHILS NFR BLD AUTO: 0.8 % (ref 0–1.5)
BILIRUB SERPL-MCNC: 0.4 MG/DL (ref 0–1.2)
BUN SERPL-MCNC: 13 MG/DL (ref 6–20)
BUN/CREAT SERPL: 11.5 (ref 7–25)
CALCIUM SPEC-SCNC: 9.5 MG/DL (ref 8.6–10.5)
CHLORIDE SERPL-SCNC: 104 MMOL/L (ref 98–107)
CHOLEST SERPL-MCNC: 145 MG/DL (ref 0–200)
CO2 SERPL-SCNC: 27.3 MMOL/L (ref 22–29)
CREAT SERPL-MCNC: 1.13 MG/DL (ref 0.76–1.27)
DEPRECATED RDW RBC AUTO: 43.5 FL (ref 37–54)
EGFRCR SERPLBLD CKD-EPI 2021: 75.8 ML/MIN/1.73
EOSINOPHIL # BLD AUTO: 0.46 10*3/MM3 (ref 0–0.4)
EOSINOPHIL NFR BLD AUTO: 9.5 % (ref 0.3–6.2)
ERYTHROCYTE [DISTWIDTH] IN BLOOD BY AUTOMATED COUNT: 12.7 % (ref 12.3–15.4)
GLOBULIN UR ELPH-MCNC: 2.8 GM/DL
GLUCOSE SERPL-MCNC: 98 MG/DL (ref 65–99)
HCT VFR BLD AUTO: 48.4 % (ref 37.5–51)
HCV AB SER QL: REACTIVE
HDLC SERPL-MCNC: 65 MG/DL (ref 40–60)
HGB BLD-MCNC: 16.7 G/DL (ref 13–17.7)
IMM GRANULOCYTES # BLD AUTO: 0.01 10*3/MM3 (ref 0–0.05)
IMM GRANULOCYTES NFR BLD AUTO: 0.2 % (ref 0–0.5)
LDLC SERPL CALC-MCNC: 70 MG/DL (ref 0–100)
LDLC/HDLC SERPL: 1.09 {RATIO}
LYMPHOCYTES # BLD AUTO: 2.09 10*3/MM3 (ref 0.7–3.1)
LYMPHOCYTES NFR BLD AUTO: 43.1 % (ref 19.6–45.3)
MCH RBC QN AUTO: 32.2 PG (ref 26.6–33)
MCHC RBC AUTO-ENTMCNC: 34.5 G/DL (ref 31.5–35.7)
MCV RBC AUTO: 93.3 FL (ref 79–97)
MONOCYTES # BLD AUTO: 0.86 10*3/MM3 (ref 0.1–0.9)
MONOCYTES NFR BLD AUTO: 17.7 % (ref 5–12)
NEUTROPHILS NFR BLD AUTO: 1.39 10*3/MM3 (ref 1.7–7)
NEUTROPHILS NFR BLD AUTO: 28.7 % (ref 42.7–76)
NRBC BLD AUTO-RTO: 0 /100 WBC (ref 0–0.2)
PLATELET # BLD AUTO: 251 10*3/MM3 (ref 140–450)
PMV BLD AUTO: 10.6 FL (ref 6–12)
POTASSIUM SERPL-SCNC: 4.3 MMOL/L (ref 3.5–5.2)
PROT SERPL-MCNC: 7 G/DL (ref 6–8.5)
RBC # BLD AUTO: 5.19 10*6/MM3 (ref 4.14–5.8)
SODIUM SERPL-SCNC: 141 MMOL/L (ref 136–145)
TRIGL SERPL-MCNC: 46 MG/DL (ref 0–150)
TSH SERPL DL<=0.05 MIU/L-ACNC: 0.58 UIU/ML (ref 0.27–4.2)
VLDLC SERPL-MCNC: 10 MG/DL (ref 5–40)
WBC NRBC COR # BLD AUTO: 4.85 10*3/MM3 (ref 3.4–10.8)

## 2024-09-27 PROCEDURE — 80061 LIPID PANEL: CPT

## 2024-09-27 PROCEDURE — 86803 HEPATITIS C AB TEST: CPT

## 2024-09-27 PROCEDURE — 85025 COMPLETE CBC W/AUTO DIFF WBC: CPT

## 2024-09-27 PROCEDURE — 84443 ASSAY THYROID STIM HORMONE: CPT

## 2024-09-27 PROCEDURE — 80053 COMPREHEN METABOLIC PANEL: CPT

## 2024-09-30 DIAGNOSIS — R76.8 HEPATITIS C ANTIBODY POSITIVE IN BLOOD: Primary | ICD-10-CM

## 2024-10-01 ENCOUNTER — TELEPHONE (OUTPATIENT)
Dept: INTERNAL MEDICINE | Facility: CLINIC | Age: 59
End: 2024-10-01
Payer: COMMERCIAL

## 2024-10-01 NOTE — TELEPHONE ENCOUNTER
----- Message from Coni Laureano sent at 9/30/2024  2:27 PM EDT -----  Mr. Arriaza,  I attempted to call you via telephone on 9/30/2024 at 220 however your phone was not taking calls at that time.    I reviewed your lab work.  At this time your CBC is stable.  Your cholesterol is fantastic. Your thyroid function is within normal range. Your CMP shows normal liver and kidney function.     Your hepatitis C antibody has come back reactive.  At this time I will refer you to an infectious disease doctor for further investigation and treatment options.  Their office will be reaching out to you to schedule an appointment.    The podiatry office with Dr. Porter and Kris I have been trying to contact you to schedule a visit for your foot issues.  Please contact their office at [phone].     Please let me know if you have further questions or concerns,  REJI Manuel

## 2024-10-17 ENCOUNTER — HOSPITAL ENCOUNTER (OUTPATIENT)
Facility: HOSPITAL | Age: 59
Discharge: HOME | End: 2024-10-17
Payer: COMMERCIAL

## 2024-10-17 DIAGNOSIS — F17.210 NICOTINE DEPENDENCE, CIGARETTES, UNCOMPLICATED: Chronic | ICD-10-CM

## 2024-10-17 PROCEDURE — 71271 CT THORAX LUNG CANCER SCR C-: CPT

## 2024-10-22 DIAGNOSIS — R91.1 LUNG NODULE: Primary | ICD-10-CM

## 2024-10-23 ENCOUNTER — TELEPHONE (OUTPATIENT)
Dept: INTERNAL MEDICINE | Facility: CLINIC | Age: 59
End: 2024-10-23
Payer: COMMERCIAL

## 2024-10-23 NOTE — TELEPHONE ENCOUNTER
----- Message from Coni Laureano sent at 10/22/2024  1:29 PM EDT -----  Mr Josr,   I have reviewed your chest CT. There are lung nodules noted. It is suggested to monitor these nodules in 12 months for growth or changes. I will place orders for one year from now for a repeat scan.   Thank you for allowing us to care for you,  REJI Manuel

## 2024-10-23 NOTE — TELEPHONE ENCOUNTER
Called pt [phone] and lvm to call office to go over CT scan, [facility]

## 2024-11-04 ENCOUNTER — OFFICE VISIT (OUTPATIENT)
Dept: INFECTIOUS DISEASES | Facility: CLINIC | Age: 59
End: 2024-11-04
Payer: COMMERCIAL

## 2024-11-04 VITALS
HEART RATE: 76 BPM | SYSTOLIC BLOOD PRESSURE: 107 MMHG | WEIGHT: 170.8 LBS | RESPIRATION RATE: 20 BRPM | DIASTOLIC BLOOD PRESSURE: 71 MMHG | BODY MASS INDEX: 25.98 KG/M2 | TEMPERATURE: 97.3 F

## 2024-11-04 DIAGNOSIS — R76.8 HEPATITIS C ANTIBODY TEST POSITIVE: Primary | ICD-10-CM

## 2024-11-04 PROCEDURE — 99204 OFFICE O/P NEW MOD 45 MIN: CPT | Performed by: STUDENT IN AN ORGANIZED HEALTH CARE EDUCATION/TRAINING PROGRAM

## 2024-11-04 NOTE — PROGRESS NOTES
"Chief Complaint  new patient    Subjective        Barrett Arriaza presents to [facility]  History of Present Illness    Patient is a 59 y.o. male referred from primary care in the setting of positive hepatitis C antibody testing.    Patient reports that he was unaware of his hepatitis C testing prior to this.  States he has never used injection drugs.  Does have a history of alcohol abuse with approximately 2 shots per day.  Sometimes he drinks a little bit more.  Does use cannabis.  Reports history of multiple present tattoos.    Objective   Vital Signs:  /71   Pulse 76   Temp 97.3 °F (36.3 °C)   Resp 20   Wt 77.5 kg (170 lb 12.8 oz)   BMI 25.98 kg/m²   Estimated body mass index is 25.98 kg/m² as calculated from the following:    Height as of 9/19/24: 172.7 cm (67.99\").    Weight as of this encounter: 77.5 kg (170 lb 12.8 oz).            Physical Exam  Constitutional:       General: He is not in acute distress.     Appearance: Normal appearance. He is normal weight. He is not ill-appearing.   HENT:      Head: Normocephalic and atraumatic.      Nose: Nose normal. No rhinorrhea.      Mouth/Throat:      Mouth: Mucous membranes are moist.      Pharynx: No oropharyngeal exudate.   Eyes:      General: No scleral icterus.     Extraocular Movements: Extraocular movements intact.      Pupils: Pupils are equal, round, and reactive to light.   Cardiovascular:      Rate and Rhythm: Normal rate.      Pulses: Normal pulses.   Pulmonary:      Effort: Pulmonary effort is normal. No respiratory distress.   Abdominal:      General: Abdomen is flat.      Palpations: Abdomen is soft.   Musculoskeletal:         General: No swelling or tenderness. Normal range of motion.      Cervical back: Normal range of motion and neck supple.      Right lower leg: No edema.      Left lower leg: No edema.   Skin:     General: Skin is warm and dry.      Findings: No rash.   Neurological:      General: " No focal deficit present.      Mental Status: He is alert and oriented to person, place, and time.   Psychiatric:         Mood and Affect: Mood normal.         Behavior: Behavior normal.        Result Review :  The following data was reviewed by: Jasson Maldonado DO on 11/04/2024:  Common labs          2/2/2024    10:44 2/12/2024    11:08 9/27/2024    07:32   Common Labs   Glucose 94  92  98    BUN 11  10  13    Creatinine 0.95  0.82  1.13    Sodium 140  138  141    Potassium 4.0  4.1  4.3    Chloride 103  103  104    Calcium 9.5  9.6  9.5    Albumin  4.3  4.2    Total Bilirubin  0.8  0.4    Alkaline Phosphatase  96  113    AST (SGOT)  21  23    ALT (SGPT)  24  35    WBC 5.70  7.60  4.85    Hemoglobin 16.0  16.2  16.7    Hematocrit 47.0  46.7  48.4    Platelets 271  257  251    Total Cholesterol   145    Triglycerides   46    HDL Cholesterol   65    LDL Cholesterol    70      Data reviewed : Hepatitis C testing           Assessment and Plan   Diagnoses and all orders for this visit:    1. Hepatitis C antibody test positive (Primary)  -     Hepatitis Panel, Acute; Future  -     HIV-1 / O / 2 Ag / Antibody; Future  -     Protime-INR; Future  -     HCV FibroSURE; Future  -     US Liver; Future  -     CBC & Differential; Future  -     Comprehensive Metabolic Panel; Future  -     HCV RNA By PCR, Qn Rfx Demetria; Future    Today we discussed his diagnosis and risk factors.  Discussed reinfection and the possibility this could happen even with treatment.  Discussed treatment options and durations.  Will obtain baseline labs and genotyping.  Will also eval for cirrhosis given his alcohol history.  In the event he has cirrhosis then would need referral to hepatology for treatment.  Perform other hepatitis and HIV screening.       I spent 49 minutes caring for Barrett on this date of service. This time includes time spent by me in the following activities:preparing for the visit, reviewing tests, obtaining and/or reviewing a  separately obtained history, performing a medically appropriate examination and/or evaluation , counseling and educating the patient/family/caregiver, ordering medications, tests, or procedures, documenting information in the medical record, independently interpreting results and communicating that information with the patient/family/caregiver, and care coordination  Follow Up   No follow-ups on file.  Patient was given instructions and counseling regarding his condition or for health maintenance advice. Please see specific information pulled into the AVS if appropriate.

## 2024-11-07 ENCOUNTER — PATIENT ROUNDING (BHMG ONLY) (OUTPATIENT)
Dept: INFECTIOUS DISEASES | Facility: CLINIC | Age: 59
End: 2024-11-07
Payer: COMMERCIAL

## 2024-11-21 ENCOUNTER — HOSPITAL ENCOUNTER (OUTPATIENT)
Dept: ULTRASOUND IMAGING | Facility: HOSPITAL | Age: 59
End: 2024-11-21
Payer: COMMERCIAL

## 2024-11-27 ENCOUNTER — HOSPITAL ENCOUNTER (OUTPATIENT)
Dept: ULTRASOUND IMAGING | Facility: HOSPITAL | Age: 59
Discharge: HOME | End: 2024-11-27
Admitting: STUDENT IN AN ORGANIZED HEALTH CARE EDUCATION/TRAINING PROGRAM
Payer: COMMERCIAL

## 2024-11-27 ENCOUNTER — TELEPHONE (OUTPATIENT)
Dept: INFECTIOUS DISEASES | Facility: CLINIC | Age: 59
End: 2024-11-27
Payer: COMMERCIAL

## 2024-11-27 DIAGNOSIS — R76.8 HEPATITIS C ANTIBODY TEST POSITIVE: ICD-10-CM

## 2024-11-27 PROCEDURE — 76705 ECHO EXAM OF ABDOMEN: CPT

## 2024-11-27 NOTE — TELEPHONE ENCOUNTER
I contacted patient to remind him to have the lab work done that Dr. Maldonado had ordered when he last saw patient in office in Sept. Patient stated understanding and said that he would get the labs done Friday.

## 2024-12-05 ENCOUNTER — TELEPHONE (OUTPATIENT)
Dept: INFECTIOUS DISEASES | Facility: CLINIC | Age: 59
End: 2024-12-05
Payer: COMMERCIAL

## 2024-12-09 ENCOUNTER — TELEPHONE (OUTPATIENT)
Dept: INFECTIOUS DISEASES | Facility: CLINIC | Age: 59
End: 2024-12-09
Payer: COMMERCIAL

## 2024-12-09 NOTE — TELEPHONE ENCOUNTER
It was noted that patient has still not had his lab work completed that Dr. Maldonado was needing prior to having appt. today & so I atempted to call patient but unable to leave voice message on patient's phone.  I then called patient's nahidance Mashac[facility] Barlow and left voice message to please have patient call our office & talk w/ nurse Rodriguez because he needs to reschedule his appt for today w/ Dr. Maldonado.   I then texted patient to please call our office & ask for nurse Rodriguez to reschedule his appt w/ Dr. Maldonado because he is needing to have his labs done prior to this appt.    Our office # was left for both.

## 2025-01-04 ENCOUNTER — APPOINTMENT (OUTPATIENT)
Dept: CT IMAGING | Facility: HOSPITAL | Age: 60
End: 2025-01-04
Payer: COMMERCIAL

## 2025-01-04 ENCOUNTER — HOSPITAL ENCOUNTER (EMERGENCY)
Facility: HOSPITAL | Age: 60
Discharge: HOME | End: 2025-01-04
Attending: EMERGENCY MEDICINE
Payer: COMMERCIAL

## 2025-01-04 VITALS
SYSTOLIC BLOOD PRESSURE: 120 MMHG | HEART RATE: 71 BPM | BODY MASS INDEX: 26.51 KG/M2 | OXYGEN SATURATION: 97 % | RESPIRATION RATE: 16 BRPM | HEIGHT: 68 IN | TEMPERATURE: 98.4 F | WEIGHT: 174.9 LBS | DIASTOLIC BLOOD PRESSURE: 84 MMHG

## 2025-01-04 DIAGNOSIS — R51.9 SINUS HEADACHE: Primary | ICD-10-CM

## 2025-01-04 LAB
ALBUMIN SERPL-MCNC: 4.2 G/DL (ref 3.5–5.2)
ALBUMIN/GLOB SERPL: 1.3 G/DL
ALP SERPL-CCNC: 112 U/L (ref 39–117)
ALT SERPL W P-5'-P-CCNC: 28 U/L (ref 1–41)
ANION GAP SERPL CALCULATED.3IONS-SCNC: 8.1 MMOL/L (ref 5–15)
AST SERPL-CCNC: 26 U/L (ref 1–40)
BASOPHILS # BLD AUTO: 0.01 10*3/MM3 (ref 0–0.2)
BASOPHILS NFR BLD AUTO: 0.2 % (ref 0–1.5)
BDY SITE: ABNORMAL
BILIRUB SERPL-MCNC: 0.6 MG/DL (ref 0–1.2)
BUN SERPL-MCNC: 10 MG/DL (ref 6–20)
BUN/CREAT SERPL: 11.6 (ref 7–25)
CALCIUM SPEC-SCNC: 9.2 MG/DL (ref 8.6–10.5)
CHLORIDE SERPL-SCNC: 103 MMOL/L (ref 98–107)
CO2 SERPL-SCNC: 25.9 MMOL/L (ref 22–29)
COHGB MFR BLD: 6.7 % (ref 0–1.5)
CREAT SERPL-MCNC: 0.86 MG/DL (ref 0.76–1.27)
DEPRECATED RDW RBC AUTO: 46 FL (ref 37–54)
EGFRCR SERPLBLD CKD-EPI 2021: 101 ML/MIN/1.73
EOSINOPHIL # BLD AUTO: 0.45 10*3/MM3 (ref 0–0.4)
EOSINOPHIL NFR BLD AUTO: 8 % (ref 0.3–6.2)
ERYTHROCYTE [DISTWIDTH] IN BLOOD BY AUTOMATED COUNT: 13 % (ref 12.3–15.4)
GLOBULIN UR ELPH-MCNC: 3.2 GM/DL
GLUCOSE SERPL-MCNC: 108 MG/DL (ref 65–99)
HCT VFR BLD AUTO: 47.8 % (ref 37.5–51)
HGB BLD-MCNC: 16.4 G/DL (ref 13–17.7)
IMM GRANULOCYTES # BLD AUTO: 0.01 10*3/MM3 (ref 0–0.05)
IMM GRANULOCYTES NFR BLD AUTO: 0.2 % (ref 0–0.5)
LYMPHOCYTES # BLD AUTO: 2.46 10*3/MM3 (ref 0.7–3.1)
LYMPHOCYTES NFR BLD AUTO: 43.9 % (ref 19.6–45.3)
Lab: ABNORMAL
MAGNESIUM SERPL-MCNC: 2 MG/DL (ref 1.6–2.6)
MCH RBC QN AUTO: 32.1 PG (ref 26.6–33)
MCHC RBC AUTO-ENTMCNC: 34.3 G/DL (ref 31.5–35.7)
MCV RBC AUTO: 93.5 FL (ref 79–97)
MODALITY: ABNORMAL
MONOCYTES # BLD AUTO: 0.9 10*3/MM3 (ref 0.1–0.9)
MONOCYTES NFR BLD AUTO: 16 % (ref 5–12)
NEUTROPHILS NFR BLD AUTO: 1.78 10*3/MM3 (ref 1.7–7)
NEUTROPHILS NFR BLD AUTO: 31.7 % (ref 42.7–76)
PLATELET # BLD AUTO: 256 10*3/MM3 (ref 140–450)
PMV BLD AUTO: 9.1 FL (ref 6–12)
POTASSIUM SERPL-SCNC: 3.5 MMOL/L (ref 3.5–5.2)
PROT SERPL-MCNC: 7.4 G/DL (ref 6–8.5)
RBC # BLD AUTO: 5.11 10*6/MM3 (ref 4.14–5.8)
SODIUM SERPL-SCNC: 137 MMOL/L (ref 136–145)
WBC NRBC COR # BLD AUTO: 5.61 10*3/MM3 (ref 3.4–10.8)

## 2025-01-04 PROCEDURE — 82375 ASSAY CARBOXYHB QUANT: CPT | Performed by: EMERGENCY MEDICINE

## 2025-01-04 PROCEDURE — 99284 EMERGENCY DEPT VISIT MOD MDM: CPT

## 2025-01-04 PROCEDURE — 85025 COMPLETE CBC W/AUTO DIFF WBC: CPT | Performed by: EMERGENCY MEDICINE

## 2025-01-04 PROCEDURE — 25810000003 SODIUM CHLORIDE 0.9 % SOLUTION: Performed by: EMERGENCY MEDICINE

## 2025-01-04 PROCEDURE — 83735 ASSAY OF MAGNESIUM: CPT | Performed by: EMERGENCY MEDICINE

## 2025-01-04 PROCEDURE — 25010000002 DIPHENHYDRAMINE PER 50 MG: Performed by: EMERGENCY MEDICINE

## 2025-01-04 PROCEDURE — 25010000002 METOCLOPRAMIDE PER 10 MG: Performed by: EMERGENCY MEDICINE

## 2025-01-04 PROCEDURE — 96361 HYDRATE IV INFUSION ADD-ON: CPT

## 2025-01-04 PROCEDURE — 96374 THER/PROPH/DIAG INJ IV PUSH: CPT

## 2025-01-04 PROCEDURE — 70450 CT HEAD/BRAIN W/O DYE: CPT

## 2025-01-04 PROCEDURE — 96375 TX/PRO/DX INJ NEW DRUG ADDON: CPT

## 2025-01-04 PROCEDURE — 25010000002 FENTANYL CITRATE (PF) 50 MCG/ML SOLUTION: Performed by: EMERGENCY MEDICINE

## 2025-01-04 PROCEDURE — 80053 COMPREHEN METABOLIC PANEL: CPT | Performed by: EMERGENCY MEDICINE

## 2025-01-04 RX ORDER — PREDNISONE 20 MG/1
TABLET ORAL
Qty: 12 TABLET | Refills: 0 | Status: SHIPPED | OUTPATIENT
Start: 2025-01-04

## 2025-01-04 RX ORDER — AZITHROMYCIN 250 MG/1
TABLET, FILM COATED ORAL
Qty: 6 TABLET | Refills: 0 | Status: SHIPPED | OUTPATIENT
Start: 2025-01-04

## 2025-01-04 RX ORDER — HYDROCODONE BITARTRATE AND ACETAMINOPHEN 5; 325 MG/1; MG/1
1 TABLET ORAL EVERY 6 HOURS PRN
Qty: 10 TABLET | Refills: 0 | Status: SHIPPED | OUTPATIENT
Start: 2025-01-04 | End: 2025-01-07

## 2025-01-04 RX ORDER — METOCLOPRAMIDE HYDROCHLORIDE 5 MG/ML
10 INJECTION INTRAMUSCULAR; INTRAVENOUS ONCE
Status: COMPLETED | OUTPATIENT
Start: 2025-01-04 | End: 2025-01-04

## 2025-01-04 RX ORDER — FENTANYL CITRATE 50 UG/ML
50 INJECTION, SOLUTION INTRAMUSCULAR; INTRAVENOUS ONCE
Status: COMPLETED | OUTPATIENT
Start: 2025-01-04 | End: 2025-01-04

## 2025-01-04 RX ORDER — DIPHENHYDRAMINE HYDROCHLORIDE 50 MG/ML
25 INJECTION INTRAMUSCULAR; INTRAVENOUS ONCE
Status: COMPLETED | OUTPATIENT
Start: 2025-01-04 | End: 2025-01-04

## 2025-01-04 RX ADMIN — DIPHENHYDRAMINE HYDROCHLORIDE 25 MG: 50 INJECTION, SOLUTION INTRAMUSCULAR; INTRAVENOUS at 01:25

## 2025-01-04 RX ADMIN — FENTANYL CITRATE 50 MCG: 50 INJECTION INTRAMUSCULAR; INTRAVENOUS at 02:06

## 2025-01-04 RX ADMIN — METOCLOPRAMIDE 10 MG: 5 INJECTION, SOLUTION INTRAMUSCULAR; INTRAVENOUS at 01:25

## 2025-01-04 RX ADMIN — SODIUM CHLORIDE 1000 ML: 9 INJECTION, SOLUTION INTRAVENOUS at 01:24

## 2025-01-04 NOTE — FS[facility] PROVIDER NOTE
EMERGENCY DEPARTMENT ENCOUNTER    Room Number:  01/01  Date seen:  1/4/2025  Time seen: 01:03 EST  PCP: Coni Laureano APRN  Historian:     Discussed/obtained information from independent historians:     HPI:    The patient is a 59-year-old male.  He presents with a 2-week history of intermittent frontal headache as well as intermittent nausea and dizziness.  He states that the gas company came out to their apartment complex and noted a gas leak.  He states his nostrils have been burning.  No documented fever or chills.      External (non-ED) record review:        Chronic or social conditions impacting care:    ALLERGIES  Iodinated contrast media, Oxycodone, Penicillins, Morphine, and Oxycodone-acetaminophen    PAST MEDICAL HISTORY  Active Ambulatory Problems     Diagnosis Date Noted    Abdominal pain 02/05/2019    Weight loss 02/24/2022    Family history of colon cancer in father 02/24/2022    Nausea 02/24/2022    Prostate cancer 12/18/2023    Onychomycosis 12/18/2023    Alcohol abuse 09/30/2020    Cannabis abuse 01/01/1982    Chronic pain 08/28/2020    Diarrhea 08/04/2017    Deep vein thrombosis (DVT) of left lower extremity 02/05/2019    Coronary artery vasospasm 11/12/2020    Contrast media allergy 02/05/2019    Chronic post-traumatic stress disorder 06/08/2021    Rectal bleeding 08/22/2020    Schizoaffective disorder, bipolar type 02/02/2022    Spondylosis of cervical spine 08/22/2020    Tobacco abuse 02/05/2019    Family history of colon cancer 08/07/2017    Substance abuse 11/11/2020    Snoring 12/18/2023    TYRON (obstructive sleep apnea) 12/18/2023    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    Acute cholecystitis 02/13/2015    Irritable bowel syndrome with diarrhea 12/19/2023    Internal hemorrhoids 12/19/2023    Precordial pain 06/16/2018    Right-sided back pain 08/22/2020    Cocaine abuse 01/01/1995    Alcohol use disorder, severe, in early remission 03/09/2023    Syncope and collapse 02/05/2019     Schizophrenia, paranoid 06/16/2018    Recurrent major depressive episodes, moderate 12/21/2023    Recurrent major depression 12/21/2023    Hospital discharge follow-up 03/09/2023    Gasping for breath 12/21/2023    Excessive daytime sleepiness 12/21/2023    Body mass index (BMI) of 24.0-24.9 in adult 03/09/2023    Alcohol withdrawal syndrome without complication 06/16/2018    Arthritis of joint of toe 07/30/2024    Carpal tunnel syndrome 07/30/2024    Malignant tumor of prostate 07/30/2024    Malignant tumor of testis 07/30/2024    Annual physical exam 09/19/2024     Resolved Ambulatory Problems     Diagnosis Date Noted    Colon cancer 12/18/2023     Past Medical History:   Diagnosis Date    AAA (abdominal aortic aneurysm)     Asbestos exposure     Carpal tunnel syndrome of right wrist 03/26/2022    Cervical radiculopathy 08/2021    Cervical spondylosis     Chest pain 11/11/2020    Closed head injury without loss of consciousness 06/11/2023    Colitis 09/12/2016    Colon polyps     DDD (degenerative disc disease), lumbar     Elevated liver enzymes 10/2021    Glaucoma     Hallucinations 10/26/2021    Hemorrhoids     Herniated lumbar intervertebral disc 08/2020    History of testicular cancer     Left varicocele 02/04/2019    Lumbar paraspinal muscle spasm 07/2020    Malnutrition 10/2023    Perianal dermatitis 12/2022    Presence of artificial eye     Proctitis 06/08/2023    Sprain of cervical neck 06/2023       PAST SURGICAL HISTORY  Past Surgical History:   Procedure Laterality Date    ANTERIOR CERVICAL DISCECTOMY W/ FUSION N/A 01/04/2021    C4-5, DR. DUANE DENSLER AT [place]    CARDIAC CATHETERIZATION Left 10/02/2020    DR. RICO MARADIAGA AT [place]    CHOLECYSTECTOMY N/A 02/13/2015    LAPAROSCOPIC WITH IOC, DR. CARLOS CONCEPCION AT [place]    COLONOSCOPY N/A 04/19/2022    FLEXIBLE SIGMOIDOSCOOPYY TO 45 CM,LARGE HEMORRHOIDS, SOLID STOOL IN SIGMOID, RESCOPE IN 6 MONTHS, DR. LAILA JEAN-BAPTISTE AT [facility]    COLONOSCOPY N/A 05/10/2022     5 MM TUBULAR ADENOMA POLYP IN DESCENDING, 4 MM BENIGN POLYP IN SIGMOID, MEDIUM DIVERTICULA IN SIGMOID, LARGE HEMORRHOIDS, DR. LAILA JEAN-BAPTISTE AT [facility]    ENDOSCOPY N/A 04/19/2022    MEDIUM AMOUNT OF FOOD IN GASTRIC ANTRUM, GASTRITIS, DR. LAILA JEAN-BAPTISTE AT [facility]    EYE ENUCLEATION Right     HAND TENDON REPAIR Left 09/2019    AT [facility]    HEMORRHOIDECTOMY N/A 02/08/2024    Procedure: LIGATION OF INTERNAL HEMORROIDS;  Surgeon: Edna Gilmore MD;  Location: [facility];  Service: General;  Laterality: N/A;    NECK SURGERY N/A 2001    SCROTAL SURGERY      WITH SKIN GRAFT FROM  LEFT THIGH    TRANSESOPHAGEAL ECHOCARDIOGRAM (RAFFAELE) N/A 08/28/2020    DR. NEETA CRUZ AT [place]    UPPER GASTROINTESTINAL ENDOSCOPY         FAMILY HISTORY  Family History   Problem Relation Age of Onset    Hypertension Mother     Asthma Mother     Diabetes Mother     Sleep apnea Mother     Cancer Father     Colon cancer Father     Diabetes Sister     Sleep apnea Sister     Hypertension Brother     Asthma Brother     Sleep apnea Brother     Malig Hyperthermia Neg Hx        SOCIAL HISTORY  Social History     Socioeconomic History    Marital status: Single   Tobacco Use    Smoking status: Some Days     Current packs/day: 1.00     Average packs/day: 0.8 packs/day for 98.1 years (80.6 ttl pk-yrs)     Types: Cigarettes     Start date: 1/1/1979     Passive exposure: Current    Smokeless tobacco: Never   Vaping Use    Vaping status: Never Used   Substance and Sexual Activity    Alcohol use: Yes     Alcohol/week: 1.0 standard drink of alcohol     Types: 1 Shots of liquor per week    Drug use: Yes     Frequency: 3.0 times per week     Types: Marijuana     Comment: Daily    Sexual activity: Yes     Partners: Female     Birth control/protection: None       REVIEW OF SYSTEMS  Review of Systems    All systems reviewed and negative except for those discussed in HPI.       PHYSICAL EXAM    I have reviewed the triage vital signs and nursing notes.  Vitals:     01/04/25 0145   BP:    Pulse: 71   Resp:    Temp:    SpO2: 97%     Physical Exam  Vital signs: Reviewed in nurses notes    General: Awake alert.  He does appear to be uncomfortable but is nontoxic-appearing    HEENT: Normocephalic atraumatic nasopharynx is congested.  Oropharynx is clear and moist    Neck:   Supple without meningismus    Respiratory:   Minimal expiratory wheezes scattered bilaterally equal breath sounds    Cardiovascular: Regular rate and rhythm.  No murmur.  Equal pulses in bilateral lower extremities without edema.    Abdomen: Nondistended    Skin:   Warm and dry.  No rashes noted    Neurological examination: Patient is awake alert oriented x4.  Speech is normal.  No facial palsy.  No focal motor or sensory deficits.    LAB RESULTS  Recent Results (from the past 24 hours)   Comprehensive Metabolic Panel    Collection Time: 01/04/25  1:20 AM    Specimen: Blood   Result Value Ref Range    Glucose 108 (H) 65 - 99 mg/dL    BUN 10 6 - 20 mg/dL    Creatinine 0.86 0.76 - 1.27 mg/dL    Sodium 137 136 - 145 mmol/L    Potassium 3.5 3.5 - 5.2 mmol/L    Chloride 103 98 - 107 mmol/L    CO2 25.9 22.0 - 29.0 mmol/L    Calcium 9.2 8.6 - 10.5 mg/dL    Total Protein 7.4 6.0 - 8.5 g/dL    Albumin 4.2 3.5 - 5.2 g/dL    ALT (SGPT) 28 1 - 41 U/L    AST (SGOT) 26 1 - 40 U/L    Alkaline Phosphatase 112 39 - 117 U/L    Total Bilirubin 0.6 0.0 - 1.2 mg/dL    Globulin 3.2 gm/dL    A/G Ratio 1.3 g/dL    BUN/Creatinine Ratio 11.6 7.0 - 25.0    Anion Gap 8.1 5.0 - 15.0 mmol/L    eGFR 101.0 >60.0 mL/min/1.73   Magnesium    Collection Time: 01/04/25  1:20 AM    Specimen: Blood   Result Value Ref Range    Magnesium 2.0 1.6 - 2.6 mg/dL   CBC Auto Differential    Collection Time: 01/04/25  1:20 AM    Specimen: Blood   Result Value Ref Range    WBC 5.61 3.40 - 10.80 10*3/mm3    RBC 5.11 4.14 - 5.80 10*6/mm3    Hemoglobin 16.4 13.0 - 17.7 g/dL    Hematocrit 47.8 37.5 - 51.0 %    MCV 93.5 79.0 - 97.0 fL    MCH 32.1 26.6 - 33.0 pg     MCHC 34.3 31.5 - 35.7 g/dL    RDW 13.0 12.3 - 15.4 %    RDW-SD 46.0 37.0 - 54.0 fl    MPV 9.1 6.0 - 12.0 fL    Platelets 256 140 - 450 10*3/mm3    Neutrophil % 31.7 (L) 42.7 - 76.0 %    Lymphocyte % 43.9 19.6 - 45.3 %    Monocyte % 16.0 (H) 5.0 - 12.0 %    Eosinophil % 8.0 (H) 0.3 - 6.2 %    Basophil % 0.2 0.0 - 1.5 %    Immature Grans % 0.2 0.0 - 0.5 %    Neutrophils, Absolute 1.78 1.70 - 7.00 10*3/mm3    Lymphocytes, Absolute 2.46 0.70 - 3.10 10*3/mm3    Monocytes, Absolute 0.90 0.10 - 0.90 10*3/mm3    Eosinophils, Absolute 0.45 (H) 0.00 - 0.40 10*3/mm3    Basophils, Absolute 0.01 0.00 - 0.20 10*3/mm3    Immature Grans, Absolute 0.01 0.00 - 0.05 10*3/mm3   Carboxyhemoglobin    Collection Time: 01/04/25  2:31 AM    Specimen: Blood   Result Value Ref Range    Carboxyhemoglobin 6.7 (H) 0 - 1.5 %    Site Venous     Modality RA     Note:       Resulted on behalf of [facility]       Ordered the above labs and independently interpreted results.  My findings will be discussed in the ED course or medical decision making section below      PROCEDURES:  Procedures      RADIOLOGY RESULTS  CT Head Without Contrast    Result Date: 1/4/2025  CT OF THE HEAD WITHOUT CONTRAST  HISTORY: Intermittent headache  COMPARISON: June 11, 2023  TECHNIQUE: Axial CT imaging was obtained through the brain. No IV contrast was administered.  FINDINGS: No acute intracranial hemorrhage is seen. The ventricles are normal in size. There is no midline shift or mass effect. 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 There is extensive opacification of the paranasal sinuses. Air-fluid levels are noted within the sphenoid and left maxillary sinuses. This could reflect acute sinusitis. Mastoid air cells are grossly clear.       1. No acute intracranial findings. 2. Sinus inflammatory changes suggesting acute sinusitis.  Radiation dose reduction techniques were utilized, including automated exposure control and exposure  modulation based on body size.   This report was finalized on 1/4/2025 1:41 AM by Dr. Kaylynn Thomas M.D on Workstation: [id number]        Ordered the above noted radiological studies.  Independently interpreted by me.  My findings will be discussed in the medical decision section below.     PROGRESS, DATA ANALYSIS, CONSULTS AND MEDICAL DECISION MAKING    Please note that this section constitutes my independent interpretation of clinical data including lab results, radiology, EKG's.  This constitutes my independent professional opinion regarding differential diagnosis and management of this patient.  It may include any factors such as history from outside sources, review of external records, social determinants of health, management of medications, response to those treatments, and discussions with other providers.       Orders placed during this visit:  Orders Placed This Encounter   Procedures    CT Head Without Contrast    Comprehensive Metabolic Panel    Magnesium    CBC Auto Differential    Carboxyhemoglobin    CBC & Differential       Medications   sodium chloride 0.9 % bolus 1,000 mL (1,000 mL Intravenous New Bag 1/4/25 0124)   diphenhydrAMINE (BENADRYL) injection 25 mg (25 mg Intravenous Given 1/4/25 0125)   metoclopramide (REGLAN) injection 10 mg (10 mg Intravenous Given 1/4/25 0125)   fentaNYL citrate (PF) (SUBLIMAZE) injection 50 mcg (50 mcg Intravenous Given 1/4/25 0206)            Medical Decision Making          DIAGNOSIS  Final diagnoses:   Sinus headache          Medication List        New Prescriptions      azithromycin 250 MG tablet  Commonly known as: ZITHROMAX  2 po on day one, then 1 po daily x 4 days.     HYDROcodone-acetaminophen 5-325 MG per tablet  Commonly known as: NORCO  Take 1 tablet by mouth Every 6 (Six) Hours As Needed for Moderate Pain for up to 3 days.     predniSONE 20 MG tablet  Commonly known as: DELTASONE  3 po daily x 2d, then 2 po daily x 2d, then 1 po daily x 2d.             Changed      fluticasone 50 MCG/ACT nasal spray  Commonly known as: FLONASE  What changed:   when to take this  reasons to take this               Where to Get Your Medications        These medications were sent to [id number] - [place], [place] - [address] AT [address] [facility] [address] [facility] [address] - [phone]  - [phone] FX  [address] [facility] [address]      Phone: [phone]   azithromycin 250 MG tablet  HYDROcodone-acetaminophen 5-325 MG per tablet  predniSONE 20 MG tablet         FOLLOW-UP  Coni Laureano, APRN  [address]  [facility] [id number] [phone]    In 1 week          Latest Documented Vital Signs:  As of 03:13 EST  BP- 120/84 HR- 71 Temp- 98.4 °F (36.9 °C) (Oral) O2 sat- 97%    Appropriate PPE utilized throughout this patient encounter to include mask, if indicated, per current protocol. Hand hygiene was performed before donning PPE and after removal when leaving the room.    Please note that portions of this were completed with a voice recognition program.     Note Disclaimer: At [facility], we believe that sharing information builds trust and better relationships. You are receiving this note because you are receiving care at [facility] or recently visited. It is possible you will see health information before a provider has talked with you about it. This kind of information can be easy to misunderstand. To help you fully understand what it means for your health, we urge you to discuss this note with your provider.

## 2025-01-04 NOTE — DISCHARGE INSTRUCTIONS
Today the CAT scan of your brain reveals that your brain is normal in appearance with no evidence of stroke or swelling.  You do have evidence of acute sinusitis in the sinus cavities.    The carbon oxide level that we tested is actually within the normal limits of a tobacco smoker.  This reflects only the last several hours however.  The last 2 weeks likely will not be reflected in this level as the gas clears quickly once the leak is stopped.    We are going to treat your sinusitis with an appropriate antibiotic, a course of steroids, and some appropriate pain medication.  Take as directed    Please read all of the instructions in this handout.  If you receive prescriptions please fill them and take them as directed.  Please call your primary care physician for follow-up appointment in the next 5 to 7 days.  If you do not have a physician you may call the [facility] referral line at [phone].    You may return to the emergency department at any time for any concerns such as worsening symptoms.  If you received a work or school note it will be printed at the back of this packet.

## 2025-01-30 ENCOUNTER — TELEPHONE (OUTPATIENT)
Dept: INTERNAL MEDICINE | Facility: CLINIC | Age: 60
End: 2025-01-30

## 2025-01-30 NOTE — TELEPHONE ENCOUNTER
Pt would like a RX sent to [facility] for Boost that was previously prescribed by [facility]. Pt states that [employer] said [facility] was who we should send too. Phone number provided by pt for [facility] at [phone]

## 2025-01-30 NOTE — TELEPHONE ENCOUNTER
Caller: Barrett Arriaza    Relationship: Self    Best call back number: [phone]     What medication are you requesting: STRAWBERRY BOOST      If a prescription is needed, what is your preferred pharmacy and phone number:  [phone]  FAX: [phone]     Additional notes: PATIENT STATES THAT HE NEEDS A PRESCRIPTION FOR BOOST. STATES THAT HE ONLY HAS 2 REMAINING.

## 2025-01-31 ENCOUNTER — OFFICE VISIT (OUTPATIENT)
Dept: INTERNAL MEDICINE | Facility: CLINIC | Age: 60
End: 2025-01-31
Payer: COMMERCIAL

## 2025-01-31 VITALS
WEIGHT: 168 LBS | SYSTOLIC BLOOD PRESSURE: 116 MMHG | DIASTOLIC BLOOD PRESSURE: 70 MMHG | HEIGHT: 68 IN | TEMPERATURE: 96.9 F | HEART RATE: 83 BPM | OXYGEN SATURATION: 96 % | BODY MASS INDEX: 25.46 KG/M2

## 2025-01-31 DIAGNOSIS — Z09 HOSPITAL DISCHARGE FOLLOW-UP: Primary | ICD-10-CM

## 2025-01-31 DIAGNOSIS — J01.41 ACUTE RECURRENT PANSINUSITIS: ICD-10-CM

## 2025-01-31 DIAGNOSIS — G56.03 CARPAL TUNNEL SYNDROME, BILATERAL: ICD-10-CM

## 2025-01-31 DIAGNOSIS — J31.0 CHRONIC RHINITIS: ICD-10-CM

## 2025-01-31 DIAGNOSIS — Z76.0 ENCOUNTER FOR MEDICATION REFILL: ICD-10-CM

## 2025-01-31 DIAGNOSIS — R05.3 CHRONIC COUGH: Chronic | ICD-10-CM

## 2025-01-31 PROCEDURE — 99215 OFFICE O/P EST HI 40 MIN: CPT

## 2025-01-31 PROCEDURE — 1159F MED LIST DOCD IN RCRD: CPT

## 2025-01-31 PROCEDURE — 1125F AMNT PAIN NOTED PAIN PRSNT: CPT

## 2025-01-31 PROCEDURE — 1160F RVW MEDS BY RX/DR IN RCRD: CPT

## 2025-01-31 RX ORDER — ALBUTEROL SULFATE 90 UG/1
2 INHALANT RESPIRATORY (INHALATION) EVERY 4 HOURS PRN
Qty: 18 G | Refills: 4 | Status: SHIPPED | OUTPATIENT
Start: 2025-01-31

## 2025-01-31 RX ORDER — FLUTICASONE PROPIONATE 50 MCG
2 SPRAY, SUSPENSION (ML) NASAL AS NEEDED
Qty: 18.9 G | Refills: 5 | Status: SHIPPED | OUTPATIENT
Start: 2025-01-31

## 2025-01-31 RX ORDER — PRENATAL VIT 91/IRON/FOLIC/DHA 28-975-200
1 COMBINATION PACKAGE (EA) ORAL 2 TIMES DAILY
Qty: 42 G | Refills: 0 | Status: CANCELLED | OUTPATIENT
Start: 2025-01-31

## 2025-01-31 RX ORDER — BUDESONIDE AND FORMOTEROL FUMARATE DIHYDRATE 80; 4.5 UG/1; UG/1
2 AEROSOL RESPIRATORY (INHALATION)
Qty: 10.2 G | Refills: 12 | Status: SHIPPED | OUTPATIENT
Start: 2025-01-31

## 2025-01-31 RX ORDER — DOXYCYCLINE 100 MG/1
100 CAPSULE ORAL 2 TIMES DAILY
Qty: 20 CAPSULE | Refills: 0 | Status: SHIPPED | OUTPATIENT
Start: 2025-01-31

## 2025-01-31 NOTE — PROGRESS NOTES
Chief Complaint  Hospital Follow Up Visit (Went in for dizziness, fatigue, weird odor smells), Headache (Front of head and on top starting 12/2024), and Hand Pain (Unable to sleep )     Subjective:      History of Present Illness {CC  Problem List  Visit  Diagnosis   Encounters  Notes  Medications  Labs  Result Review Imaging  Media :23}     Barrett Arriaza presents to [facility] for:    Patient or patient representative verbalized consent for the use of Ambient Listening during the visit with  REJI Manuel for chart documentation. 1/31/2025  08:38 EST     History of Present Illness          The patient presents for evaluation of sinusitis, cough, headache, and carpal tunnel syndrome.    He was recently hospitalized due to a sinus infection, as confirmed by a CT scan. He reports experiencing severe headaches, which he attributes to the sinusitis. He has been managing his pain with ibuprofen and finds relief by lying down, closing his eyes, and listening to the television. He is not taking any Tylenol.    He also reports a persistent cough but does not have any expectoration. He continues to smoke, although less frequently. He has discontinued the use of Symbicort and Flonase, the latter of which he only uses during the summer months. He is seeking a refill of his albuterol inhaler, which he has completely exhausted.     He is requesting a refill of his inhaler and strawberry boost that he drinks twice daily for his malnutrition. He is a new patient with [facility]. He drinks 2 Boost a day and is needed more. He is currently out of Boost. Boost is the only brand he can tolerate and strawberry flavor.    He reports severe hand pain that frequently disrupts his sleep. He does not experience any numbness in his fingers upon waking. He suspects the pain may be due to carpal tunnel syndrome, although he has not undergone surgery for this condition. He has previously  used a brace for support.    Supplemental Information  He is not taking blood pressure medicine because every time he checks his blood pressure, it is always good. He is not taking prazosin anymore. He is not taking Symbicort.    SOCIAL HISTORY  The patient admits to smoking.    MEDICATIONS  Current: Albuterol, Symbicort, Flonase, ibuprofen         I have reviewed patient's medical history, any new submitted information provided by patient or medical assistant and updated medical record.      Objective:      Physical Exam  Vitals reviewed.   Constitutional:       General: He is awake. He is not in acute distress.     Appearance: Normal appearance. He is underweight. He is not ill-appearing, toxic-appearing or diaphoretic.   HENT:      Head: Normocephalic.      Right Ear: Hearing normal.      Left Ear: Hearing normal.      Nose: Nose normal. Congestion and rhinorrhea present. Rhinorrhea is purulent.      Right Sinus: Maxillary sinus tenderness and frontal sinus tenderness present.      Left Sinus: Maxillary sinus tenderness and frontal sinus tenderness present.      Mouth/Throat:      Lips: Pink.      Mouth: Mucous membranes are moist.   Eyes:      General: Lids are normal. Vision grossly intact.      Conjunctiva/sclera: Conjunctivae normal.      Comments: Right eye is glass - baseline blindness   Cardiovascular:      Rate and Rhythm: Normal rate and regular rhythm.      Pulses: Normal pulses.      Heart sounds: Normal heart sounds, S1 normal and S2 normal.   Pulmonary:      Effort: Pulmonary effort is normal.      Breath sounds: Decreased breath sounds present. No wheezing, rhonchi or rales.   Abdominal:      General: Abdomen is flat. Bowel sounds are normal.      Palpations: Abdomen is soft.      Tenderness: There is no abdominal tenderness.   Musculoskeletal:      Cervical back: Full passive range of motion without pain.      Right lower leg: No edema.      Left lower leg: No edema.   Lymphadenopathy:      Head:  "     Right side of head: No submental, submandibular or tonsillar adenopathy.      Left side of head: No submental, submandibular or tonsillar adenopathy.      Cervical: No cervical adenopathy.   Skin:     General: Skin is warm and dry.      Capillary Refill: Capillary refill takes less than 2 seconds.   Neurological:      General: No focal deficit present.      Mental Status: He is alert and oriented to person, place, and time. Mental status is at baseline.   Psychiatric:         Attention and Perception: Attention and perception normal.         Mood and Affect: Mood and affect normal.         Speech: Speech normal.         Behavior: Behavior normal. Behavior is cooperative.         Cognition and Memory: Cognition and memory normal.         Judgment: Judgment normal.        Result Review  Data Reviewed:{ Labs  Result Review  Imaging  Med Tab  Media :23}     Results  Imaging  CT scan showed sinusitis.       The following data was reviewed by: REJI Manuel on 01/31/2025         Office Visit with Romana Vogt APRN (03/25/2024)   Vital Signs:   /70 (BP Location: Left arm, Patient Position: Sitting, Cuff Size: Adult)   Pulse 83   Temp 96.9 °F (36.1 °C) (Temporal)   Ht 172.7 cm (67.99\")   Wt 76.2 kg (168 lb)   SpO2 96%   BMI 25.55 kg/m²             Requested Prescriptions     Pending Prescriptions Disp Refills    albuterol sulfate  (90 Base) MCG/ACT inhaler 18 g 4     Sig: Inhale 2 puffs Every 4 (Four) Hours As Needed for Wheezing.    Nutritional Supplements (Boost High Protein) liquid       Sig: Take 240 mL by mouth.       Routine medications provided by this office will also be refilled via pharmacy request.       Current Outpatient Medications:     albuterol sulfate  (90 Base) MCG/ACT inhaler, Inhale 2 puffs Every 4 (Four) Hours As Needed for Wheezing., Disp: 18 g, Rfl: 4    aspirin 81 MG EC tablet, Take 1 tablet by mouth Daily. HELD FOR OR, Disp: , Rfl:     citalopram " (CeleXA) 10 MG tablet, Take 1 tablet by mouth Daily., Disp: , Rfl:     fluticasone (FLONASE) 50 MCG/ACT nasal spray, Administer 2 sprays into the nostril(s) as directed by provider Daily. (Patient taking differently: Administer 2 sprays into the nostril(s) as directed by provider As Needed.), Disp: , Rfl:     gabapentin (NEURONTIN) 100 MG capsule, Take  by mouth Daily., Disp: , Rfl:     nicotine (NICODERM CQ) 14 MG/24HR patch, Place 1 patch on the skin as directed by provider Daily., Disp: 30 patch, Rfl: 1    Nutritional Supplements (Boost High Protein) liquid, Take 240 mL by mouth., Disp: , Rfl:     QUEtiapine XR (SEROquel XR) 400 MG 24 hr tablet, Take 1 tablet by mouth Every Night., Disp: , Rfl:     terbinafine (LamISIL AT) 1 % cream, Apply 1 Application topically to the appropriate area as directed 2 (Two) Times a Day., Disp: 42 g, Rfl: 0    budesonide-formoterol (Symbicort) 80-4.5 MCG/ACT inhaler, Inhale 2 puffs 2 (Two) Times a Day. (Patient not taking: Reported on 1/31/2025), Disp: 10.2 g, Rfl: 12    prazosin (MINIPRESS) 1 MG capsule, Take 1 capsule by mouth Every Night. (Patient not taking: Reported on 1/31/2025), Disp: 90 capsule, Rfl: 0     Assessment and Plan:      Assessment and Plan {CC Problem List  Visit Diagnosis  ROS  Review (Popup)  [facility] Maintenance  Quality  BestPractice  Medications  SmartSets  SnapShot Encounters  Media :23}     Problem List Items Addressed This Visit    None  Visit Diagnoses       Chronic cough  (Chronic)                    1. Sinusitis.  The patient's CT scan confirmed sinusitis. He reports headaches and facial pain consistent with this diagnosis. An antibiotic regimen of doxycycline 100 mg twice daily has been prescribed to address the sinus infection. He is advised to use Flonase regularly, especially during fluctuating weather conditions.    2. Cough.  The patient continues to smoke, which exacerbates his symptoms. He is advised to reduce smoking to improve his  respiratory symptoms. He is instructed to use Symbicort twice daily and to rinse his mouth after each use to prevent oral thrush. Albuterol will be used as a rescue inhaler between Symbicort doses. A refill for the albuterol inhaler has been provided.    3. Headache.  The headache is likely secondary to the sinusitis and cough. He is advised to complete the antibiotic course and use Symbicort to alleviate the cough, which should help reduce the headache.    4. Carpal Tunnel Syndrome.  The patient reports hand pain, which may be aggravated by arthritis. A referral to a hand surgeon has been made for further evaluation and potential treatment options, including a cortisone shot or a brace.    5. Medication Management.  A refill for the albuterol inhaler has been provided. The patient has been advised to contact the office with the name of the itching cream previously prescribed by another provider so it can be added to his medication list.    Follow-up  The patient will follow up on 03/20/2025.       Today we have placed a referral or ordered further testing:     A team member from [facility] will contact you within the next 3-5 business days to schedule your tests or referral.    If you have not heard them within this time frame, they can be contacted at [phone]    If it was an outside referral: contact our office if you have not been contacted for appointment after 7-10 business days.       Thank you for allowing us to care for you,  REJI Manuel      Follow Up {Instructions Charge Capture  Follow-up Communications :23}     No follow-ups on file.      Patient was given instructions and counseling regarding his condition or for health maintenance advice. Please see specific information pulled into the AVS if appropriate.    I spent 42 minutes caring for Barrett on this date of service. This time includes time spent by me in the following activities:preparing for the visit, reviewing  tests, obtaining and/or reviewing a separately obtained history, performing a medically appropriate examination and/or evaluation , counseling and educating the patient/family/caregiver, ordering medications, tests, or procedures, documenting information in the medical record, independently interpreting results and communicating that information with the patient/family/caregiver, and care coordination    Dragon disclaimer:   Much of this encounter note is an electronic transcription/translation of spoken language to printed text. The electronic translation of spoken language may permit erroneous, or at times, nonsensical words or phrases to be inadvertently transcribed; Although I have reviewed the note for such errors, some may still exist.     Additional Patient Counseling:       There are no Patient Instructions on file for this visit.

## 2025-02-03 ENCOUNTER — TELEPHONE (OUTPATIENT)
Dept: INTERNAL MEDICINE | Facility: CLINIC | Age: 60
End: 2025-02-03
Payer: COMMERCIAL

## 2025-02-03 NOTE — PATIENT INSTRUCTIONS
Sinus Infection, Adult  A sinus infection is soreness and swelling (inflammation) of your sinuses. Sinuses are hollow spaces in the bones around your face. They are located:  Around your eyes.  In the middle of your forehead.  Behind your nose.  In your cheekbones.  Your sinuses and nasal passages are lined with a fluid called mucus. Mucus drains out of your sinuses. Swelling can trap mucus in your sinuses. This lets germs (bacteria, virus, or fungus) grow, which leads to infection. Most of the time, this condition is caused by a virus.  What are the causes?  Allergies.  Asthma.  Germs.  Things that block your nose or sinuses.  Growths in the nose (nasal polyps).  Chemicals or irritants in the air.  A fungus. This is rare.  What increases the risk?  Having a weak body defense system (immune system).  Doing a lot of swimming or diving.  Using nasal sprays too much.  Smoking.  What are the signs or symptoms?  The main symptoms of this condition are pain and a feeling of pressure around the sinuses. Other symptoms include:  Stuffy nose (congestion). This may make it hard to breathe through your nose.  Runny nose (drainage).  Soreness, swelling, and warmth in the sinuses.  A cough that may get worse at night.  Being unable to smell and taste.  Mucus that collects in the throat or the back of the nose (postnasal drip). This may cause a sore throat or bad breath.  Being very tired (fatigued).  A fever.  How is this diagnosed?  Your symptoms.  Your medical history.  A physical exam.  Tests to find out if your condition is short-term (acute) or long-term (chronic). Your doctor may:  Check your nose for growths (polyps).  Check your sinuses using a tool that has a light on one end (endoscope).  Check for allergies or germs.  Do imaging tests, such as an MRI or CT scan.  How is this treated?  Treatment for this condition depends on the cause and whether it is short-term or long-term.  If caused by a virus, your symptoms  should go away on their own within 10 days. You may be given medicines to relieve symptoms. They include:  Medicines that shrink swollen tissue in the nose.  A spray that treats swelling of the nostrils.  Rinses that help get rid of thick mucus in your nose (nasal saline washes).  Medicines that treat allergies (antihistamines).  Over-the-counter pain relievers.  If caused by bacteria, your doctor may wait to see if you will get better without treatment. You may be given antibiotic medicine if you have:  A very bad infection.  A weak body defense system.  If caused by growths in the nose, surgery may be needed.  Follow these instructions at home:  Medicines  Take, use, or apply over-the-counter and prescription medicines only as told by your doctor. These may include nasal sprays.  If you were prescribed an antibiotic medicine, take it as told by your doctor. Do not stop taking it even if you start to feel better.  Hydrate and humidify    Drink enough water to keep your pee (urine) pale yellow.  Use a cool mist humidifier to keep the humidity level in your home above 50%.  Breathe in steam for 10-15 minutes, 3-4 times a day, or as told by your doctor. You can do this in the bathroom while a hot shower is running.  Try not to spend time in cool or dry air.  Rest  Rest as much as you can.  Sleep with your head raised (elevated).  Make sure you get enough sleep each night.  General instructions    Put a warm, moist washcloth on your face 3-4 times a day, or as often as told by your doctor.  Use nasal saline washes as often as told by your doctor.  Wash your hands often with soap and water. If you cannot use soap and water, use hand .  Do not smoke. Avoid being around people who are smoking (secondhand smoke).  Keep all follow-up visits.  Contact a doctor if:  You have a fever.  Your symptoms get worse.  Your symptoms do not get better within 10 days.  Get help right away if:  You have a very bad headache.  You  cannot stop vomiting.  You have very bad pain or swelling around your face or eyes.  You have trouble seeing.  You feel confused.  Your neck is stiff.  You have trouble breathing.  These symptoms may be an emergency. Get help right away. Call 911.  Do not wait to see if the symptoms will go away.  Do not drive yourself to the hospital.  Summary  A sinus infection is swelling of your sinuses. Sinuses are hollow spaces in the bones around your face.  This condition is caused by tissues in your nose that become inflamed or swollen. This traps germs. These can lead to infection.  If you were prescribed an antibiotic medicine, take it as told by your doctor. Do not stop taking it even if you start to feel better.  Keep all follow-up visits.  This information is not intended to replace advice given to you by your health care provider. Make sure you discuss any questions you have with your health care provider.  Document Revised: 11/22/2022 Document Reviewed: 11/22/2022  [employer] Patient Education © 2024 [employer]

## 2025-02-03 NOTE — TELEPHONE ENCOUNTER
Successfully faxed RX for strawberry boost x60 quantity to [facility] along with pt demographics and office note from 1/31/25

## 2025-02-13 ENCOUNTER — TELEPHONE (OUTPATIENT)
Dept: INTERNAL MEDICINE | Facility: CLINIC | Age: 60
End: 2025-02-13

## 2025-02-13 NOTE — TELEPHONE ENCOUNTER
Caller: Barrett Arriaza    Relationship: Self    Best call back number: [phone]     What was the call regarding: PATIENT STATES THAT COMPANY THAT HE GETS HIS BOOST FROM NEEDS ADDITIONAL INFORMATION FROM PCP, AND HAD FAXED REQUEST EARLIER THIS WEEK. PATIENT IS DOWN TO 1 BOOST AT THIS TIME.

## 2025-02-13 NOTE — TELEPHONE ENCOUNTER
Caller: PETER    Relationship:     Best call back number:          What was the call regarding: PETER FROM [facility] CALLING DUE TO MISSED CALL     PETER STATES IF OFFICE CAN FAX BACK [facility] THEY HAVE NOT  RECEIVED IT FOR THE PATIENT     PLEASE GIVE CALLBACK     [phone]

## 2025-02-13 NOTE — TELEPHONE ENCOUNTER
Called [facility] and they are going to have Karla one of the girls working on his prescription to call me back to see what's needed from me

## 2025-03-05 ENCOUNTER — HOSPITAL ENCOUNTER (EMERGENCY)
Facility: HOSPITAL | Age: 60
Discharge: HOME | End: 2025-03-05
Attending: EMERGENCY MEDICINE
Payer: COMMERCIAL

## 2025-03-05 VITALS
BODY MASS INDEX: 25.01 KG/M2 | OXYGEN SATURATION: 100 % | SYSTOLIC BLOOD PRESSURE: 102 MMHG | RESPIRATION RATE: 18 BRPM | DIASTOLIC BLOOD PRESSURE: 51 MMHG | TEMPERATURE: 98.2 F | HEART RATE: 91 BPM | HEIGHT: 68 IN | WEIGHT: 165 LBS

## 2025-03-05 DIAGNOSIS — M79.10 MYALGIA: ICD-10-CM

## 2025-03-05 DIAGNOSIS — R68.89 FLU-LIKE SYMPTOMS: Primary | ICD-10-CM

## 2025-03-05 DIAGNOSIS — R05.1 ACUTE COUGH: ICD-10-CM

## 2025-03-05 PROCEDURE — 25010000002 KETOROLAC TROMETHAMINE PER 15 MG: Performed by: EMERGENCY MEDICINE

## 2025-03-05 PROCEDURE — 99283 EMERGENCY DEPT VISIT LOW MDM: CPT

## 2025-03-05 PROCEDURE — 96372 THER/PROPH/DIAG INJ SC/IM: CPT

## 2025-03-05 PROCEDURE — 0202U NFCT DS 22 TRGT SARS-COV-2: CPT | Performed by: EMERGENCY MEDICINE

## 2025-03-05 RX ORDER — BENZONATATE 100 MG/1
100 CAPSULE ORAL 3 TIMES DAILY PRN
Qty: 30 CAPSULE | Refills: 0 | Status: SHIPPED | OUTPATIENT
Start: 2025-03-05

## 2025-03-05 RX ORDER — KETOROLAC TROMETHAMINE 15 MG/ML
15 INJECTION, SOLUTION INTRAMUSCULAR; INTRAVENOUS ONCE
Status: COMPLETED | OUTPATIENT
Start: 2025-03-05 | End: 2025-03-05

## 2025-03-05 RX ORDER — ALBUTEROL SULFATE 90 UG/1
INHALANT RESPIRATORY (INHALATION)
Qty: 8 G | Refills: 0 | Status: SHIPPED | OUTPATIENT
Start: 2025-03-05

## 2025-03-05 RX ADMIN — KETOROLAC TROMETHAMINE 15 MG: 15 INJECTION, SOLUTION INTRAMUSCULAR; INTRAVENOUS at 19:07

## 2025-03-05 NOTE — ED NOTES
Pt complains of headache and generalized weakness since a couple of days ago. Pt complains of nausea and vomiting.

## 2025-03-05 NOTE — ED PROVIDER NOTES
EMERGENCY DEPARTMENT ENCOUNTER  Room Number:  37/37  PCP: Coni Laureano APRN  Independent Historians: Patient and significant other      HPI:  Chief Complaint: Flulike symptoms    A complete HPI/ROS/PMH/PSH/SH/FH are unobtainable due to: None    Chronic or social conditions impacting patient care (Social Determinants of Health): None      Context: Barrett Arriaza is a 59 y.o. male with a medical history of alcohol use disorder, schizoaffective bipolar disorder, chronic pain, cocaine abuse, malnutrition, and colitis who presents to the ED c/o acute body aches with headache and cough over the last 3 to 4 days.  No dyspnea, nausea or vomiting.  Patient reports tolerating p.o. intake without difficulty.  No dysuria or hematuria reported.      Review of prior external notes (non-ED) -and- Review of prior external test results outside of this encounter:  Hospital discharge summary 9/30/2020 from [place] reviewed: Patient admitted for evaluation of chest pain.      PAST MEDICAL HISTORY  Active Ambulatory Problems     Diagnosis Date Noted    Abdominal pain 02/05/2019    Weight loss 02/24/2022    Family history of colon cancer in father 02/24/2022    Nausea 02/24/2022    Prostate cancer 12/18/2023    Onychomycosis 12/18/2023    Alcohol abuse 09/30/2020    Cannabis abuse 01/01/1982    Chronic pain 08/28/2020    Diarrhea 08/04/2017    Deep vein thrombosis (DVT) of left lower extremity 02/05/2019    Coronary artery vasospasm 11/12/2020    Contrast media allergy 02/05/2019    Chronic post-traumatic stress disorder 06/08/2021    Rectal bleeding 08/22/2020    Schizoaffective disorder, bipolar type 02/02/2022    Spondylosis of cervical spine 08/22/2020    Tobacco abuse 02/05/2019    Family history of colon cancer 08/07/2017    Substance abuse 11/11/2020    Snoring 12/18/2023    TYRON (obstructive sleep apnea) 12/18/2023    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    Acute cholecystitis 02/13/2015    Irritable bowel syndrome with diarrhea  12/19/2023    Internal hemorrhoids 12/19/2023    Precordial pain 06/16/2018    Right-sided back pain 08/22/2020    Cocaine abuse 01/01/1995    Alcohol use disorder, severe, in early remission 03/09/2023    Syncope and collapse 02/05/2019    Schizophrenia, paranoid 06/16/2018    Recurrent major depressive episodes, moderate 12/21/2023    Recurrent major depression 12/21/2023    Hospital discharge follow-up 03/09/2023    Gasping for breath 12/21/2023    Excessive daytime sleepiness 12/21/2023    Body mass index (BMI) of 24.0-24.9 in adult 03/09/2023    Alcohol withdrawal syndrome without complication 06/16/2018    Arthritis of joint of toe 07/30/2024    Carpal tunnel syndrome 07/30/2024    Malignant tumor of prostate 07/30/2024    Malignant tumor of testis 07/30/2024    Annual physical exam 09/19/2024     Resolved Ambulatory Problems     Diagnosis Date Noted    Colon cancer 12/18/2023     Past Medical History:   Diagnosis Date    AAA (abdominal aortic aneurysm)     Asbestos exposure     Carpal tunnel syndrome of right wrist 03/26/2022    Cervical radiculopathy 08/2021    Cervical spondylosis     Chest pain 11/11/2020    Closed head injury without loss of consciousness 06/11/2023    Colitis 09/12/2016    Colon polyps     DDD (degenerative disc disease), lumbar     Elevated liver enzymes 10/2021    Glaucoma     Hallucinations 10/26/2021    Hemorrhoids     Herniated lumbar intervertebral disc 08/2020    History of testicular cancer     Left varicocele 02/04/2019    Lumbar paraspinal muscle spasm 07/2020    Malnutrition 10/2023    Perianal dermatitis 12/2022    Presence of artificial eye     Proctitis 06/08/2023    Sprain of cervical neck 06/2023         PAST SURGICAL HISTORY  Past Surgical History:   Procedure Laterality Date    ANTERIOR CERVICAL DISCECTOMY W/ FUSION N/A 01/04/2021    C4-5, DR. DUANE DENSLER AT [place]    CARDIAC CATHETERIZATION Left 10/02/2020    DR. RICO MARADIAGA AT [place]    CHOLECYSTECTOMY N/A  02/13/2015    LAPAROSCOPIC WITH IOC, DR. CARLOS CONCEPCION AT [place]    COLONOSCOPY N/A 04/19/2022    FLEXIBLE SIGMOIDOSCOOPYY TO 45 CM,LARGE HEMORRHOIDS, SOLID STOOL IN SIGMOID, RESCOPE IN 6 MONTHS, DR. LAILA JEAN-BAPTISTE AT [facility]    COLONOSCOPY N/A 05/10/2022    5 MM TUBULAR ADENOMA POLYP IN DESCENDING, 4 MM BENIGN POLYP IN SIGMOID, MEDIUM DIVERTICULA IN SIGMOID, LARGE HEMORRHOIDS, DR. LAILA JEAN-BAPTISTE AT [facility]    ENDOSCOPY N/A 04/19/2022    MEDIUM AMOUNT OF FOOD IN GASTRIC ANTRUM, GASTRITIS, DR. LAILA JEAN-BAPTISTE AT [facility]    EYE ENUCLEATION Right     HAND TENDON REPAIR Left 09/2019    AT [facility]    HEMORRHOIDECTOMY N/A 02/08/2024    Procedure: LIGATION OF INTERNAL HEMORROIDS;  Surgeon: Edna Gilmore MD;  Location: [facility];  Service: General;  Laterality: N/A;    NECK SURGERY N/A 2001    SCROTAL SURGERY      WITH SKIN GRAFT FROM  LEFT THIGH    TRANSESOPHAGEAL ECHOCARDIOGRAM (RAFFAELE) N/A 08/28/2020    DR. NEETA CRUZ AT [place]    UPPER GASTROINTESTINAL ENDOSCOPY           FAMILY HISTORY  Family History   Problem Relation Age of Onset    Hypertension Mother     Asthma Mother     Diabetes Mother     Sleep apnea Mother     Cancer Father     Colon cancer Father     Diabetes Sister     Sleep apnea Sister     Hypertension Brother     Asthma Brother     Sleep apnea Brother     Malig Hyperthermia Neg Hx          SOCIAL HISTORY  Social History     Socioeconomic History    Marital status: Single   Tobacco Use    Smoking status: Every Day     Current packs/day: 1.00     Average packs/day: 0.8 packs/day for 98.3 years (80.8 ttl pk-yrs)     Types: Cigarettes     Start date: 1/1/1979     Passive exposure: Current    Smokeless tobacco: Never   Vaping Use    Vaping status: Never Used   Substance and Sexual Activity    Alcohol use: Yes     Alcohol/week: 1.0 standard drink of alcohol     Types: 1 Shots of liquor per week    Drug use: Yes     Frequency: 3.0 times per week     Types: Marijuana     Comment: Daily    Sexual activity: Yes     Partners:  Female     Birth control/protection: None         ALLERGIES  Iodinated contrast media, Oxycodone, Penicillins, Morphine, and Oxycodone-acetaminophen      REVIEW OF SYSTEMS  Review of Systems  Included in HPI  All systems reviewed and negative except for those discussed in HPI.      PHYSICAL EXAM    I have reviewed the triage vital signs and nursing notes.    ED Triage Vitals   Temp Heart Rate Resp BP SpO2   03/05/25 1726 03/05/25 1726 03/05/25 1726 03/05/25 1728 03/05/25 1727   98.6 °F (37 °C) 91 18 112/70 100 %      Temp src Heart Rate Source Patient Position BP Location FiO2 (%)   03/05/25 1726 03/05/25 1726 -- -- --   Tympanic Monitor          Physical Exam    Physical Exam   Constitutional: No distress.  Nontoxic  HENT:  Head: Normocephalic and atraumatic.   Oropharynx: Mucous membranes are moist.   Eyes: . No scleral icterus. No conjunctival pallor.  Neck: Normal range of motion. Neck supple.  No meningismus  Cardiovascular: Pink warm and well perfused throughout.    Pulmonary/Chest: No respiratory distress.  No tachypnea or increased work of breathing appreciated.  Speaks in full sentences  Musculoskeletal: Moves all extremities equally.  No edema of lower extremities noted  Neurological: Alert and oriented.  No acute focal deficit appreciated.  Skin: Skin is pink, warm, and dry.   Psychiatric: Mood and affect normal.   Nursing note and vitals reviewed.             LAB RESULTS  Recent Results (from the past 24 hours)   Respiratory Panel PCR w/COVID-19(SARS-CoV-2) TSERING/ZACHARY/WADE/PAD/COR/DONNA In-House, NP Swab in UTM/VTM, 2 HR TAT - Swab, Nasopharynx    Collection Time: 03/05/25  6:20 PM    Specimen: Nasopharynx; Swab   Result Value Ref Range    ADENOVIRUS, PCR Not Detected Not Detected    Coronavirus 229E Not Detected Not Detected    Coronavirus HKU1 Not Detected Not Detected    Coronavirus NL63 Not Detected Not Detected    Coronavirus OC43 Not Detected Not Detected    COVID19 Not Detected Not Detected - Ref. Range     Human Metapneumovirus Not Detected Not Detected    Human Rhinovirus/Enterovirus Not Detected Not Detected    Influenza A PCR Not Detected Not Detected    Influenza B PCR Not Detected Not Detected    Parainfluenza Virus 1 Not Detected Not Detected    Parainfluenza Virus 2 Not Detected Not Detected    Parainfluenza Virus 3 Not Detected Not Detected    Parainfluenza Virus 4 Not Detected Not Detected    RSV, PCR Not Detected Not Detected    Bordetella pertussis pcr Not Detected Not Detected    Bordetella parapertussis PCR Not Detected Not Detected    Chlamydophila pneumoniae PCR Not Detected Not Detected    Mycoplasma pneumo by PCR Not Detected Not Detected         RADIOLOGY  No Radiology Exams Resulted Within Past 24 Hours      MEDICATIONS GIVEN IN ER  Medications   ketorolac (TORADOL) injection 15 mg (15 mg Intramuscular Given 3/5/25 1907)         ORDERS PLACED DURING THIS VISIT:  Orders Placed This Encounter   Procedures    Respiratory Panel PCR w/COVID-19(SARS-CoV-2) TSERING/ZACHARY/WADE/PAD/COR/DONNA In-House, NP Swab in UTM/VTM, 2 HR TAT - Swab, Nasopharynx         OUTPATIENT MEDICATION MANAGEMENT:  No current Epic-ordered facility-administered medications on file.     Current Outpatient Medications Ordered in Epic   Medication Sig Dispense Refill    albuterol sulfate  (90 Base) MCG/ACT inhaler Inhale 2 puffs Every 4 (Four) Hours As Needed for Wheezing. 18 g 4    albuterol sulfate  (90 Base) MCG/ACT inhaler Inhale 2 puffs every 4 hours when necessary wheeze, dyspnea, cough 8 g 0    aspirin 81 MG EC tablet Take 1 tablet by mouth Daily. HELD FOR OR      benzonatate (TESSALON) 100 MG capsule Take 1 capsule by mouth 3 (Three) Times a Day As Needed for Cough. 30 capsule 0    budesonide-formoterol (Symbicort) 80-4.5 MCG/ACT inhaler Inhale 2 puffs 2 (Two) Times a Day. 10.2 g 12    citalopram (CeleXA) 10 MG tablet Take 1 tablet by mouth Daily.      doxycycline (VIBRAMYCIN) 100 MG capsule Take 1 capsule by mouth 2  (Two) Times a Day. 20 capsule 0    fluticasone (FLONASE) 50 MCG/ACT nasal spray Administer 2 sprays into the nostril(s) as directed by provider As Needed for Allergies or Rhinitis. 18.9 g 5    gabapentin (NEURONTIN) 100 MG capsule Take  by mouth Daily.      nicotine (NICODERM CQ) 14 MG/24HR patch Place 1 patch on the skin as directed by provider Daily. 30 patch 1    Nutritional Supplements (Boost High Protein) liquid Take 240 mL by mouth.      QUEtiapine XR (SEROquel XR) 400 MG 24 hr tablet Take 1 tablet by mouth Every Night.      terbinafine (LamISIL AT) 1 % cream Apply 1 Application topically to the appropriate area as directed 2 (Two) Times a Day. 42 g 0         PROCEDURES  Procedures            PROGRESS, DATA ANALYSIS, CONSULTS, AND MEDICAL DECISION MAKING  All labs have been independently interpreted by me.  All radiology studies have been reviewed by me. All EKGs have been independently viewed and interpreted by me.  Discussion below represents my analysis of pertinent findings related to patient's condition, differential diagnosis, treatment plan and final disposition.    Differential diagnosis:   My differential diagnosis for cough includes but is not limited to:  Upper respiratory infection, bronchitis, pneumonia, COPD exacerbation, cough variant asthma, cardiac asthma, coronary artery disease, congestive heart failure, bacterial, viral or lung infections, lung cancer, aspiration pneumonitis, aspiration of foreign body and Covid -19        Clinical Scores:                  ED Course as of 03/05/25 1951   Wed Mar 05, 2025   1933 COVID19: Not Detected [RS]   1933 Influenza A PCR: Not Detected [RS]   1933 Influenza B PCR: Not Detected [RS]   1933 Patient's respiratory viral panel is currently negative.  He certainly presents with suspicion for flulike symptoms.  If the Toradol has helped, I think it is reasonable with the patient with normal vital signs can return home with conservative treatment over the next  24 to 48 hours and see how he does. [RS]   1950 Patient feeling bit better after medications.  We discussed the possibility of laboratory and radiologic evaluation at this time versus discharge home with conservative therapy.  The patient's preference would be for conservative therapy but is invited to return if he worsens.  He expresses understanding agreement with this plan. [RS]      ED Course User Index  [RS] Brady Miller MD         Prescription drug monitoring program review:     AS OF 19:51 EST VITALS:    BP - 102/51  HR - 91  TEMP - 98.2 °F (36.8 °C) (Oral)  O2 SATS - 100%    COMPLEXITY OF CARE  Admission was considered but after careful review of the patient's presentation, physical examination, diagnostic results, and response to treatment the patient may be safely discharged with outpatient follow-up.      DIAGNOSIS  Final diagnoses:   Flu-like symptoms   Myalgia   Acute cough         DISPOSITION  ED Disposition       ED Disposition   Discharge    Condition   Stable    Comment   --                  DISCHARGE    Patient discharged in stable condition.    Reviewed implications of results, diagnosis, meds, responsibility to follow up, warning signs and symptoms of possible worsening, potential complications and reasons to return to ER.    Patient/Family voiced understanding of above instructions.    Discussed plan for discharge, as there is no emergent indication for admission. Patient referred to primary care provider for regular health maintenance. Pt/family is agreeable and understands need for follow up and possible repeat testing.  Pt is aware that discharge does not mean that nothing is wrong but it indicates no emergency is present that requires admission and they must continue care with follow-up as given below or physician of their choice.     FOLLOW-UP  Coni Laureano, APRN  [address] [address] [phone]    Schedule an appointment as soon as possible for a  visit   As needed    [facility] EMERGENCY DEPARTMENT  [address]  [facility] [address] [phone]  Go to   As needed, If symptoms worsen         Medication List        New Prescriptions      benzonatate 100 MG capsule  Commonly known as: TESSALON  Take 1 capsule by mouth 3 (Three) Times a Day As Needed for Cough.            Changed      * albuterol sulfate  (90 Base) MCG/ACT inhaler  Commonly known as: PROVENTIL HFA;VENTOLIN HFA;PROAIR HFA  Inhale 2 puffs Every 4 (Four) Hours As Needed for Wheezing.  What changed: Another medication with the same name was added. Make sure you understand how and when to take each.     * albuterol sulfate  (90 Base) MCG/ACT inhaler  Commonly known as: PROVENTIL HFA;VENTOLIN HFA;PROAIR HFA  Inhale 2 puffs every 4 hours when necessary wheeze, dyspnea, cough  What changed: You were already taking a medication with the same name, and this prescription was added. Make sure you understand how and when to take each.           * This list has 2 medication(s) that are the same as other medications prescribed for you. Read the directions carefully, and ask your doctor or other care provider to review them with you.                   Where to Get Your Medications        You can get these medications from any pharmacy    Bring a paper prescription for each of these medications  albuterol sulfate  (90 Base) MCG/ACT inhaler  benzonatate 100 MG capsule           Please note that portions of this document were completed with a voice recognition program.    Note Disclaimer: At [facility], we believe that sharing information builds trust and better relationships. You are receiving this note because you recently visited [facility]. It is possible you will see health information before a provider has talked with you about it. This kind of information can be easy to misunderstand. To help you fully understand what it means for your health, we  urge you to discuss this note with your provider.         Brady Miller MD  03/05/25 1951

## 2025-04-13 ENCOUNTER — APPOINTMENT (OUTPATIENT)
Dept: GENERAL RADIOLOGY | Facility: HOSPITAL | Age: 60
End: 2025-04-13
Payer: COMMERCIAL

## 2025-04-13 ENCOUNTER — HOSPITAL ENCOUNTER (EMERGENCY)
Facility: HOSPITAL | Age: 60
Discharge: HOME | End: 2025-04-13
Attending: EMERGENCY MEDICINE | Admitting: EMERGENCY MEDICINE
Payer: COMMERCIAL

## 2025-04-13 VITALS
BODY MASS INDEX: 24.99 KG/M2 | HEART RATE: 72 BPM | OXYGEN SATURATION: 98 % | TEMPERATURE: 97.3 F | SYSTOLIC BLOOD PRESSURE: 122 MMHG | DIASTOLIC BLOOD PRESSURE: 63 MMHG | WEIGHT: 164.9 LBS | RESPIRATION RATE: 18 BRPM | HEIGHT: 68 IN

## 2025-04-13 DIAGNOSIS — R07.89 ATYPICAL CHEST PAIN: Primary | ICD-10-CM

## 2025-04-13 LAB
ALBUMIN SERPL-MCNC: 4.8 G/DL (ref 3.5–5.2)
ALBUMIN/GLOB SERPL: 1.3 G/DL
ALP SERPL-CCNC: 114 U/L (ref 39–117)
ALT SERPL W P-5'-P-CCNC: 43 U/L (ref 1–41)
AMMONIA BLD-SCNC: 21 UMOL/L (ref 16–60)
AMPHET+METHAMPHET UR QL: NEGATIVE
AMPHETAMINES UR QL: NEGATIVE
ANION GAP SERPL CALCULATED.3IONS-SCNC: 12 MMOL/L (ref 5–15)
AST SERPL-CCNC: 49 U/L (ref 1–40)
BARBITURATES UR QL SCN: NEGATIVE
BASOPHILS # BLD AUTO: 0.04 10*3/MM3 (ref 0–0.2)
BASOPHILS NFR BLD AUTO: 0.6 % (ref 0–1.5)
BENZODIAZ UR QL SCN: NEGATIVE
BILIRUB SERPL-MCNC: 1 MG/DL (ref 0–1.2)
BILIRUB UR QL STRIP: NEGATIVE
BUN SERPL-MCNC: 9 MG/DL (ref 6–20)
BUN/CREAT SERPL: 10.1 (ref 7–25)
BUPRENORPHINE SERPL-MCNC: NEGATIVE NG/ML
CALCIUM SPEC-SCNC: 10.3 MG/DL (ref 8.6–10.5)
CANNABINOIDS SERPL QL: POSITIVE
CHLORIDE SERPL-SCNC: 102 MMOL/L (ref 98–107)
CLARITY UR: CLEAR
CO2 SERPL-SCNC: 27 MMOL/L (ref 22–29)
COCAINE UR QL: POSITIVE
COLOR UR: ABNORMAL
CREAT SERPL-MCNC: 0.89 MG/DL (ref 0.76–1.27)
DEPRECATED RDW RBC AUTO: 43.7 FL (ref 37–54)
EGFRCR SERPLBLD CKD-EPI 2021: 100 ML/MIN/1.73
EOSINOPHIL # BLD AUTO: 0.1 10*3/MM3 (ref 0–0.4)
EOSINOPHIL NFR BLD AUTO: 1.4 % (ref 0.3–6.2)
ERYTHROCYTE [DISTWIDTH] IN BLOOD BY AUTOMATED COUNT: 12.8 % (ref 12.3–15.4)
ETHANOL BLD-MCNC: <10 MG/DL (ref 0–10)
ETHANOL UR QL: <0.01 %
FENTANYL UR-MCNC: NEGATIVE NG/ML
GEN 5 1HR TROPONIN T REFLEX: 7 NG/L
GLOBULIN UR ELPH-MCNC: 3.6 GM/DL
GLUCOSE SERPL-MCNC: 94 MG/DL (ref 65–99)
GLUCOSE UR STRIP-MCNC: NEGATIVE MG/DL
HCT VFR BLD AUTO: 49.9 % (ref 37.5–51)
HGB BLD-MCNC: 17.6 G/DL (ref 13–17.7)
HGB UR QL STRIP.AUTO: NEGATIVE
HOLD SPECIMEN: NORMAL
HOLD SPECIMEN: NORMAL
IMM GRANULOCYTES # BLD AUTO: 0.02 10*3/MM3 (ref 0–0.05)
IMM GRANULOCYTES NFR BLD AUTO: 0.3 % (ref 0–0.5)
KETONES UR QL STRIP: ABNORMAL
LEUKOCYTE ESTERASE UR QL STRIP.AUTO: NEGATIVE
LYMPHOCYTES # BLD AUTO: 1.54 10*3/MM3 (ref 0.7–3.1)
LYMPHOCYTES NFR BLD AUTO: 22.3 % (ref 19.6–45.3)
MCH RBC QN AUTO: 33 PG (ref 26.6–33)
MCHC RBC AUTO-ENTMCNC: 35.3 G/DL (ref 31.5–35.7)
MCV RBC AUTO: 93.4 FL (ref 79–97)
METHADONE UR QL SCN: NEGATIVE
MONOCYTES # BLD AUTO: 1.15 10*3/MM3 (ref 0.1–0.9)
MONOCYTES NFR BLD AUTO: 16.6 % (ref 5–12)
NEUTROPHILS NFR BLD AUTO: 4.06 10*3/MM3 (ref 1.7–7)
NEUTROPHILS NFR BLD AUTO: 58.8 % (ref 42.7–76)
NITRITE UR QL STRIP: NEGATIVE
NRBC BLD AUTO-RTO: 0 /100 WBC (ref 0–0.2)
OPIATES UR QL: NEGATIVE
OXYCODONE UR QL SCN: NEGATIVE
PCP UR QL SCN: NEGATIVE
PH UR STRIP.AUTO: 5.5 [PH] (ref 5–8)
PLATELET # BLD AUTO: 282 10*3/MM3 (ref 140–450)
PMV BLD AUTO: 9 FL (ref 6–12)
POTASSIUM SERPL-SCNC: 3.8 MMOL/L (ref 3.5–5.2)
PROT SERPL-MCNC: 8.4 G/DL (ref 6–8.5)
PROT UR QL STRIP: ABNORMAL
RBC # BLD AUTO: 5.34 10*6/MM3 (ref 4.14–5.8)
SODIUM SERPL-SCNC: 141 MMOL/L (ref 136–145)
SP GR UR STRIP: 1.03 (ref 1–1.03)
TRICYCLICS UR QL SCN: POSITIVE
TROPONIN T NUMERIC DELTA: -2 NG/L
TROPONIN T SERPL HS-MCNC: 9 NG/L
UROBILINOGEN UR QL STRIP: ABNORMAL
WBC NRBC COR # BLD AUTO: 6.91 10*3/MM3 (ref 3.4–10.8)
WHOLE BLOOD HOLD COAG: NORMAL
WHOLE BLOOD HOLD SPECIMEN: NORMAL

## 2025-04-13 PROCEDURE — 84484 ASSAY OF TROPONIN QUANT: CPT | Performed by: EMERGENCY MEDICINE

## 2025-04-13 PROCEDURE — 93005 ELECTROCARDIOGRAM TRACING: CPT | Performed by: EMERGENCY MEDICINE

## 2025-04-13 PROCEDURE — 36415 COLL VENOUS BLD VENIPUNCTURE: CPT

## 2025-04-13 PROCEDURE — 82077 ASSAY SPEC XCP UR&BREATH IA: CPT | Performed by: EMERGENCY MEDICINE

## 2025-04-13 PROCEDURE — 81003 URINALYSIS AUTO W/O SCOPE: CPT | Performed by: EMERGENCY MEDICINE

## 2025-04-13 PROCEDURE — 80053 COMPREHEN METABOLIC PANEL: CPT | Performed by: EMERGENCY MEDICINE

## 2025-04-13 PROCEDURE — 85025 COMPLETE CBC W/AUTO DIFF WBC: CPT | Performed by: EMERGENCY MEDICINE

## 2025-04-13 PROCEDURE — 71045 X-RAY EXAM CHEST 1 VIEW: CPT

## 2025-04-13 PROCEDURE — 82140 ASSAY OF AMMONIA: CPT | Performed by: EMERGENCY MEDICINE

## 2025-04-13 PROCEDURE — 80307 DRUG TEST PRSMV CHEM ANLYZR: CPT | Performed by: EMERGENCY MEDICINE

## 2025-04-13 PROCEDURE — 99284 EMERGENCY DEPT VISIT MOD MDM: CPT

## 2025-04-13 PROCEDURE — 90791 PSYCH DIAGNOSTIC EVALUATION: CPT | Performed by: SOCIAL WORKER

## 2025-04-13 RX ORDER — ASPIRIN 325 MG
325 TABLET ORAL ONCE
Status: DISCONTINUED | OUTPATIENT
Start: 2025-04-13 | End: 2025-04-13 | Stop reason: HOSPADM

## 2025-04-13 RX ORDER — SODIUM CHLORIDE 0.9 % (FLUSH) 0.9 %
10 SYRINGE (ML) INJECTION AS NEEDED
Status: DISCONTINUED | OUTPATIENT
Start: 2025-04-13 | End: 2025-04-13 | Stop reason: HOSPADM
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
## 2025-04-13 NOTE — ED PROVIDER NOTES
EMERGENCY DEPARTMENT ENCOUNTER    Room Number:  40/40  PCP: Coni Laureano APRN  Historian: Patient      HPI:  Chief Complaint: Chest pain and cocaine use  A complete HPI/ROS/PMH/PSH/SH/FH are unobtainable due to: None  Context: Barrett Arriaza is a 59 y.o. male who presents to the ED c/o chest pain and cocaine use.  Patient with history of schizoaffective disorder.  Patient presents for evaluation of chest pain.  Patient states she used cocaine about 4:00 this morning.  States he snorted it and did not inject it or smoke.  Woke up this morning with chest pain that he described as pressure.  States his chest is tender to touch.  Patient states symptoms of gotten significantly better.  Symptoms started about 9 AM.  Has had no fevers or chills.  States he has not used cocaine in a while.  Has had no vomiting or diarrhea.            PAST MEDICAL HISTORY  Active Ambulatory Problems     Diagnosis Date Noted    Abdominal pain 02/05/2019    Weight loss 02/24/2022    Family history of colon cancer in father 02/24/2022    Nausea 02/24/2022    Prostate cancer 12/18/2023    Onychomycosis 12/18/2023    Alcohol abuse 09/30/2020    Cannabis abuse 01/01/1982    Chronic pain 08/28/2020    Diarrhea 08/04/2017    Deep vein thrombosis (DVT) of left lower extremity 02/05/2019    Coronary artery vasospasm 11/12/2020    Contrast media allergy 02/05/2019    Chronic post-traumatic stress disorder 06/08/2021    Rectal bleeding 08/22/2020    Schizoaffective disorder, bipolar type 02/02/2022    Spondylosis of cervical spine 08/22/2020    Tobacco abuse 02/05/2019    Family history of colon cancer 08/07/2017    Substance abuse 11/11/2020    Snoring 12/18/2023    TYRON (obstructive sleep apnea) 12/18/2023    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    Acute cholecystitis 02/13/2015    Irritable bowel syndrome with diarrhea 12/19/2023    Internal hemorrhoids 12/19/2023    Precordial pain 06/16/2018    Right-sided back pain 08/22/2020    Cocaine abuse  01/01/1995    Alcohol use disorder, severe, in early remission 03/09/2023    Syncope and collapse 02/05/2019    Schizophrenia, paranoid 06/16/2018    Recurrent major depressive episodes, moderate 12/21/2023    Recurrent major depression 12/21/2023    Hospital discharge follow-up 03/09/2023    Gasping for breath 12/21/2023    Excessive daytime sleepiness 12/21/2023    Body mass index (BMI) of 24.0-24.9 in adult 03/09/2023    Alcohol withdrawal syndrome without complication 06/16/2018    Arthritis of joint of toe 07/30/2024    Carpal tunnel syndrome 07/30/2024    Malignant tumor of prostate 07/30/2024    Malignant tumor of testis 07/30/2024    Annual physical exam 09/19/2024     Resolved Ambulatory Problems     Diagnosis Date Noted    Colon cancer 12/18/2023     Past Medical History:   Diagnosis Date    AAA (abdominal aortic aneurysm)     Asbestos exposure     Carpal tunnel syndrome of right wrist 03/26/2022    Cervical radiculopathy 08/2021    Cervical spondylosis     Chest pain 11/11/2020    Closed head injury without loss of consciousness 06/11/2023    Colitis 09/12/2016    Colon polyps     DDD (degenerative disc disease), lumbar     Elevated liver enzymes 10/2021    Glaucoma     Hallucinations 10/26/2021    Hemorrhoids     Herniated lumbar intervertebral disc 08/2020    History of testicular cancer     Left varicocele 02/04/2019    Lumbar paraspinal muscle spasm 07/2020    Malnutrition 10/2023    Perianal dermatitis 12/2022    Presence of artificial eye     Proctitis 06/08/2023    Sprain of cervical neck 06/2023         PAST SURGICAL HISTORY  Past Surgical History:   Procedure Laterality Date    ANTERIOR CERVICAL DISCECTOMY W/ FUSION N/A 01/04/2021    C4-5, DR. DUANE DENSLER AT [place]    CARDIAC CATHETERIZATION Left 10/02/2020    DR. RICO MARADIAGA AT [place]    CHOLECYSTECTOMY N/A 02/13/2015    LAPAROSCOPIC WITH IOC, DR. CARLOS CONCEPCION AT [place]    COLONOSCOPY N/A 04/19/2022    FLEXIBLE SIGMOIDOSCOOPYY TO 45  CM,LARGE HEMORRHOIDS, SOLID STOOL IN SIGMOID, RESCOPE IN 6 MONTHS, DR. LAILA JEAN-BAPTISTE AT [facility]    COLONOSCOPY N/A 05/10/2022    5 MM TUBULAR ADENOMA POLYP IN DESCENDING, 4 MM BENIGN POLYP IN SIGMOID, MEDIUM DIVERTICULA IN SIGMOID, LARGE HEMORRHOIDS, DR. LAILA JEAN-BAPTISTE AT [facility]    ENDOSCOPY N/A 04/19/2022    MEDIUM AMOUNT OF FOOD IN GASTRIC ANTRUM, GASTRITIS, DR. LAILA JEAN-BAPTISTE AT [facility]    EYE ENUCLEATION Right     HAND TENDON REPAIR Left 09/2019    AT [facility]    HEMORRHOIDECTOMY N/A 02/08/2024    Procedure: LIGATION OF INTERNAL HEMORROIDS;  Surgeon: Edna Gilmore MD;  Location: [facility] OR;  Service: General;  Laterality: N/A;    NECK SURGERY N/A 2001    SCROTAL SURGERY      WITH SKIN GRAFT FROM  LEFT THIGH    TRANSESOPHAGEAL ECHOCARDIOGRAM (RAFFAELE) N/A 08/28/2020    DR. NEETA CRUZ AT [place]    UPPER GASTROINTESTINAL ENDOSCOPY           FAMILY HISTORY  Family History   Problem Relation Age of Onset    Hypertension Mother     Asthma Mother     Diabetes Mother     Sleep apnea Mother     Cancer Father     Colon cancer Father     Diabetes Sister     Sleep apnea Sister     Hypertension Brother     Asthma Brother     Sleep apnea Brother     Malig Hyperthermia Neg Hx          SOCIAL HISTORY  Social History     Socioeconomic History    Marital status: Single   Tobacco Use    Smoking status: Every Day     Current packs/day: 1.00     Average packs/day: 0.8 packs/day for 98.4 years (80.9 ttl pk-yrs)     Types: Cigarettes     Start date: 1/1/1979     Passive exposure: Current    Smokeless tobacco: Never   Vaping Use    Vaping status: Never Used   Substance and Sexual Activity    Alcohol use: Yes     Alcohol/week: 1.0 standard drink of alcohol     Types: 1 Shots of liquor per week    Drug use: Yes     Frequency: 3.0 times per week     Types: Marijuana     Comment: Daily    Sexual activity: Yes     Partners: Female     Birth control/protection: None         ALLERGIES  Iodinated contrast media, Oxycodone, Penicillins, Morphine, and  Oxycodone-acetaminophen        REVIEW OF SYSTEMS  Review of Systems   Chest pain      PHYSICAL EXAM  ED Triage Vitals   Temp Heart Rate Resp BP SpO2   04/13/25 1132 04/13/25 1132 04/13/25 1138 04/13/25 1132 04/13/25 1132   97.3 °F (36.3 °C) 88 20 118/86 98 %      Temp src Heart Rate Source Patient Position BP Location FiO2 (%)   04/13/25 1132 -- -- -- --   Tympanic           Physical Exam      GENERAL: no acute distress  HENT: nares patent  EYES: no scleral icterus  CV: regular rhythm, normal rate.  Tenderness to chest wall  RESPIRATORY: normal effort, lungs clear  ABDOMEN: soft, nondistended nontender  MUSCULOSKELETAL: no deformity  NEURO: alert, moves all extremities, follows commands  PSYCH:  calm, cooperative  SKIN: warm, dry    Vital signs and nursing notes reviewed.          LAB RESULTS  Recent Results (from the past 24 hours)   ECG 12 Lead ED Triage Standing Order; Chest Pain    Collection Time: 04/13/25 11:41 AM   Result Value Ref Range    QT Interval 394 ms    QTC Interval 413 ms   Comprehensive Metabolic Panel    Collection Time: 04/13/25 12:06 PM    Specimen: Blood   Result Value Ref Range    Glucose 94 65 - 99 mg/dL    BUN 9 6 - 20 mg/dL    Creatinine 0.89 0.76 - 1.27 mg/dL    Sodium 141 136 - 145 mmol/L    Potassium 3.8 3.5 - 5.2 mmol/L    Chloride 102 98 - 107 mmol/L    CO2 27.0 22.0 - 29.0 mmol/L    Calcium 10.3 8.6 - 10.5 mg/dL    Total Protein 8.4 6.0 - 8.5 g/dL    Albumin 4.8 3.5 - 5.2 g/dL    ALT (SGPT) 43 (H) 1 - 41 U/L    AST (SGOT) 49 (H) 1 - 40 U/L    Alkaline Phosphatase 114 39 - 117 U/L    Total Bilirubin 1.0 0.0 - 1.2 mg/dL    Globulin 3.6 gm/dL    A/G Ratio 1.3 g/dL    BUN/Creatinine Ratio 10.1 7.0 - 25.0    Anion Gap 12.0 5.0 - 15.0 mmol/L    eGFR 100.0 >60.0 mL/min/1.73   High Sensitivity Troponin T    Collection Time: 04/13/25 12:06 PM    Specimen: Blood   Result Value Ref Range    HS Troponin T 9 <22 ng/L   Green Top (Gel)    Collection Time: 04/13/25 12:06 PM   Result Value Ref Range     Extra Tube Hold for add-ons.    Lavender Top    Collection Time: 04/13/25 12:06 PM   Result Value Ref Range    Extra Tube hold for add-on    Gold Top - SST    Collection Time: 04/13/25 12:06 PM   Result Value Ref Range    Extra Tube Hold for add-ons.    Light Blue Top    Collection Time: 04/13/25 12:06 PM   Result Value Ref Range    Extra Tube Hold for add-ons.    CBC Auto Differential    Collection Time: 04/13/25 12:06 PM    Specimen: Blood   Result Value Ref Range    WBC 6.91 3.40 - 10.80 10*3/mm3    RBC 5.34 4.14 - 5.80 10*6/mm3    Hemoglobin 17.6 13.0 - 17.7 g/dL    Hematocrit 49.9 37.5 - 51.0 %    MCV 93.4 79.0 - 97.0 fL    MCH 33.0 26.6 - 33.0 pg    MCHC 35.3 31.5 - 35.7 g/dL    RDW 12.8 12.3 - 15.4 %    RDW-SD 43.7 37.0 - 54.0 fl    MPV 9.0 6.0 - 12.0 fL    Platelets 282 140 - 450 10*3/mm3    Neutrophil % 58.8 42.7 - 76.0 %    Lymphocyte % 22.3 19.6 - 45.3 %    Monocyte % 16.6 (H) 5.0 - 12.0 %    Eosinophil % 1.4 0.3 - 6.2 %    Basophil % 0.6 0.0 - 1.5 %    Immature Grans % 0.3 0.0 - 0.5 %    Neutrophils, Absolute 4.06 1.70 - 7.00 10*3/mm3    Lymphocytes, Absolute 1.54 0.70 - 3.10 10*3/mm3    Monocytes, Absolute 1.15 (H) 0.10 - 0.90 10*3/mm3    Eosinophils, Absolute 0.10 0.00 - 0.40 10*3/mm3    Basophils, Absolute 0.04 0.00 - 0.20 10*3/mm3    Immature Grans, Absolute 0.02 0.00 - 0.05 10*3/mm3    nRBC 0.0 0.0 - 0.2 /100 WBC   Ammonia    Collection Time: 04/13/25 12:06 PM    Specimen: Blood   Result Value Ref Range    Ammonia 21 16 - 60 umol/L   Ethanol    Collection Time: 04/13/25 12:06 PM    Specimen: Blood   Result Value Ref Range    Ethanol <10 0 - 10 mg/dL    Ethanol % <0.010 %   Urinalysis With Culture If Indicated - Urine, Clean Catch    Collection Time: 04/13/25 12:59 PM    Specimen: Urine, Clean Catch   Result Value Ref Range    Color, UA Dark Yellow (A) Yellow, Straw    Appearance, UA Clear Clear    pH, UA 5.5 5.0 - 8.0    Specific Gravity, UA 1.028 1.005 - 1.030    Glucose, UA Negative Negative     Ketones, UA 15 mg/dL (1+) (A) Negative    Bilirubin, UA Negative Negative    Blood, UA Negative Negative    Protein, UA Trace (A) Negative    Leuk Esterase, UA Negative Negative    Nitrite, UA Negative Negative    Urobilinogen, UA 1.0 E.U./dL 0.2 - 1.0 E.U./dL   Urine Drug Screen - Urine, Clean Catch    Collection Time: 04/13/25 12:59 PM    Specimen: Urine, Clean Catch   Result Value Ref Range    THC, Screen, Urine Positive (A) Negative    Phencyclidine (PCP), Urine Negative Negative    Cocaine Screen, Urine Positive (A) Negative    Methamphetamine, Ur Negative Negative    Opiate Screen Negative Negative    Amphetamine Screen, Urine Negative Negative    Benzodiazepine Screen, Urine Negative Negative    Tricyclic Antidepressants Screen Positive (A) Negative    Methadone Screen, Urine Negative Negative    Barbiturates Screen, Urine Negative Negative    Oxycodone Screen, Urine Negative Negative    Buprenorphine, Screen, Urine Negative Negative   Fentanyl, Urine - Urine, Clean Catch    Collection Time: 04/13/25 12:59 PM    Specimen: Urine, Clean Catch   Result Value Ref Range    Fentanyl, Urine Negative Negative   High Sensitivity Troponin T 1Hr    Collection Time: 04/13/25  2:11 PM    Specimen: Blood   Result Value Ref Range    HS Troponin T 7 <22 ng/L    Troponin T Numeric Delta -2 Abnormal if >/=3 ng/L       Ordered the above labs and reviewed the results.        RADIOLOGY  XR Chest 1 View  Result Date: 4/13/2025  XR CHEST 1 VW-  HISTORY: 59-year-old male with chest pain.  FINDINGS: There is no evidence for pneumonia, effusions, or CHF.  This report was finalized on 4/13/2025 12:29 PM by Dr. Evelia Lay M.D on Workstation: "[employer]        Ordered the above noted radiological studies.  Chest x-ray independent interpreted by me and shows no evidence of pneumothorax          PROCEDURES  Procedures    EKG          EKG time: 1141  Rhythm/Rate: Normal sinus rhythm 66  P waves and WI: Normal P waves   QRS, axis: Normal  QRS  ST and T waves: Normal ST-T wave    Interpreted Contemporaneously by me, independently viewed  Unchanged compared to prior 9/25/24      MEDICATIONS GIVEN IN ER  Medications   sodium chloride 0.9 % flush 10 mL (has no administration in time range)   aspirin tablet 325 mg (325 mg Oral Not Given 4/13/25 1140)                   MEDICAL DECISION MAKING, PROGRESS, and CONSULTS    All labs have been independently reviewed by me.  All radiology studies have been reviewed by me and I have also reviewed the radiology report.   EKG's independently viewed and interpreted by me.  Discussion below represents my analysis of pertinent findings related to patient's condition, differential diagnosis, treatment plan and final disposition.      Additional sources:  - Discussed/ obtained information from independent historians: None    - External (non-ED) record review: Epic reviewed patient seen in primary doctor's office 1/31/2025 for chronic sinusitis     - Chronic or social conditions impacting care: none    - Shared decision making: None      Orders placed during this visit:  Orders Placed This Encounter   Procedures    XR Chest 1 View    Martin Draw    Comprehensive Metabolic Panel    High Sensitivity Troponin T    CBC Auto Differential    Ammonia    Urinalysis With Culture If Indicated - Urine, Clean Catch    Urine Drug Screen - Urine, Clean Catch    High Sensitivity Troponin T 1Hr    Ethanol    Fentanyl, Urine - Urine, Clean Catch    NPO Diet NPO Type: Strict NPO    Undress & Gown    Continuous Pulse Oximetry    Psych / Access to see    Oxygen Therapy- Nasal Cannula; Titrate 1-6 LPM Per SpO2; 90 - 95%    ECG 12 Lead ED Triage Standing Order; Chest Pain    ECG 12 Lead ED Triage Standing Order; Chest Pain    Insert Peripheral IV    CBC & Differential    Green Top (Gel)    Lavender Top    Gold Top - SST    Light Blue Top         Additional orders considered but not ordered:  None        Differential diagnosis includes but is  not limited to:    Atypical chest pain, chest wall pain, ACS reflux,      Independent interpretation of labs, radiology studies, and discussions with consultants:  ED Course as of 04/13/25 1521   Sun Apr 13, 2025   1510 15:10 EDT  Patient here for chest pain that appears atypical.  Patient's pain is definitely worse with palpation.  Did use cocaine.  Patient has serial troponins that are negative.  EKG that is nonacute.  Patient also requesting access as patient has been having some paranoid thoughts.  Access has seen him and is arranged follow-up.  Instructed to return here for any concerns. [SL]      ED Course User Index  [SL] Timothy Grajeda MD                 DIAGNOSIS  Final diagnoses:   Atypical chest pain         DISPOSITION  DISCHARGE    Patient discharged in stable condition.    Reviewed implications of results, diagnosis, meds, responsibility to follow up, warning signs and symptoms of possible worsening, potential complications and reasons to return to ER, including worsening symptoms    Patient/Family voiced understanding of above instructions.    Discussed plan for discharge, as there is no emergent indication for admission. Patient referred to primary care provider for BP management due to today's BP. Pt/family is agreeable and understands need for follow up and repeat testing.  Pt is aware that discharge does not mean that nothing is wrong but it indicates no emergency is present that requires admission and they must continue care with follow-up as given below or physician of their choice.     FOLLOW-UP  Coni Laureano, APRN  [address] [id number] [phone]    Schedule an appointment as soon as possible for a visit            Medication List      No changes were made to your prescriptions during this visit.                  Latest Documented Vital Signs:  As of 15:21 EDT  BP- 122/63 HR- 72 Temp- 97.3 °F (36.3 °C) (Tympanic) O2 sat- 98%              --    Please  note that portions of this were completed with a voice recognition program.       Note Disclaimer: At [facility], we believe that sharing information builds trust and better relationships. You are receiving this note because you are receiving care at [facility] or recently visited. It is possible you will see health information before a provider has talked with you about it. This kind of information can be easy to misunderstand. To help you fully understand what it means for your health, we urge you to discuss this note with your provider.            Timothy Grajeda MD  04/13/25 [id number]

## 2025-04-13 NOTE — THERAPY DISCHARGE NOTE
[facility]  [phone]    ACCESS CENTER STATEMENT OF DISPOSITION        I, Barrett Arriaza, was assessed in the [facility] at [facility] [place] by [facility] on 4/13/2025.  I understand the recommendations below and what follow-up action is expected of me.    Outpt Psychiatric referrals:    -[facility] [address] [phone]    -[place] [address] [phone]  [facility] [address]  [phone]    Ask about possible EMDR therapy for trauma            ________________________________  Patient/Parent/Guardian/POA Signature    ________________________________  Clinician Signature    4/13/2025  14:13 EDT

## 2025-04-14 LAB
QT INTERVAL: 394 MS
QTC INTERVAL: 413 MS

## 2025-05-06 ENCOUNTER — TELEPHONE (OUTPATIENT)
Dept: INFECTIOUS DISEASES | Facility: CLINIC | Age: 60
End: 2025-05-06
Payer: COMMERCIAL

## 2025-05-06 NOTE — TELEPHONE ENCOUNTER
I spoke w/ patient on phone and reminded him of the lab work that Dr. Maldonado had ordered and that he can have the labs done at a [facility] outpatient lab since they have the orders noted in the [facility] system. He stated that he would go ahead and have the labs done in the next few days.

## 2025-05-22 ENCOUNTER — OFFICE VISIT (OUTPATIENT)
Dept: INTERNAL MEDICINE | Facility: CLINIC | Age: 60
End: 2025-05-22
Payer: COMMERCIAL

## 2025-05-22 ENCOUNTER — LAB (OUTPATIENT)
Facility: HOSPITAL | Age: 60
End: 2025-05-22
Payer: COMMERCIAL

## 2025-05-22 VITALS
HEART RATE: 74 BPM | DIASTOLIC BLOOD PRESSURE: 78 MMHG | OXYGEN SATURATION: 97 % | HEIGHT: 68 IN | SYSTOLIC BLOOD PRESSURE: 122 MMHG | TEMPERATURE: 96.9 F | BODY MASS INDEX: 23.49 KG/M2 | WEIGHT: 155 LBS

## 2025-05-22 DIAGNOSIS — R21 RASH: ICD-10-CM

## 2025-05-22 DIAGNOSIS — J44.1 COPD WITH EXACERBATION: Chronic | ICD-10-CM

## 2025-05-22 DIAGNOSIS — R05.3 CHRONIC COUGH: Chronic | ICD-10-CM

## 2025-05-22 DIAGNOSIS — R06.02 SHORTNESS OF BREATH: ICD-10-CM

## 2025-05-22 DIAGNOSIS — B19.20 HEPATITIS C VIRUS INFECTION WITHOUT HEPATIC COMA, UNSPECIFIED CHRONICITY: Chronic | ICD-10-CM

## 2025-05-22 DIAGNOSIS — Z13.6 SCREENING FOR CARDIOVASCULAR CONDITION: ICD-10-CM

## 2025-05-22 DIAGNOSIS — J30.89 ENVIRONMENTAL AND SEASONAL ALLERGIES: Chronic | ICD-10-CM

## 2025-05-22 DIAGNOSIS — Z00.00 ROUTINE HEALTH MAINTENANCE: Primary | ICD-10-CM

## 2025-05-22 DIAGNOSIS — M19.90 ARTHRITIS: Chronic | ICD-10-CM

## 2025-05-22 DIAGNOSIS — Z72.0 TOBACCO ABUSE: Chronic | ICD-10-CM

## 2025-05-22 DIAGNOSIS — K62.89 RECTAL IRRITATION: Chronic | ICD-10-CM

## 2025-05-22 DIAGNOSIS — B18.2 CHRONIC HEPATITIS C WITHOUT HEPATIC COMA: Chronic | ICD-10-CM

## 2025-05-22 LAB
ALBUMIN SERPL-MCNC: 4.2 G/DL (ref 3.5–5.2)
ALBUMIN/GLOB SERPL: 1.3 G/DL
ALP SERPL-CCNC: 116 U/L (ref 39–117)
ALT SERPL W P-5'-P-CCNC: 26 U/L (ref 1–41)
ANION GAP SERPL CALCULATED.3IONS-SCNC: 9.5 MMOL/L (ref 5–15)
AST SERPL-CCNC: 30 U/L (ref 1–40)
BASOPHILS # BLD AUTO: 0.06 10*3/MM3 (ref 0–0.2)
BASOPHILS NFR BLD AUTO: 0.9 % (ref 0–1.5)
BILIRUB SERPL-MCNC: 0.6 MG/DL (ref 0–1.2)
BUN SERPL-MCNC: 10 MG/DL (ref 6–20)
BUN/CREAT SERPL: 9.5 (ref 7–25)
CALCIUM SPEC-SCNC: 9.8 MG/DL (ref 8.6–10.5)
CHLORIDE SERPL-SCNC: 102 MMOL/L (ref 98–107)
CHOLEST SERPL-MCNC: 142 MG/DL (ref 0–200)
CO2 SERPL-SCNC: 29.5 MMOL/L (ref 22–29)
CREAT SERPL-MCNC: 1.05 MG/DL (ref 0.76–1.27)
DEPRECATED RDW RBC AUTO: 45 FL (ref 37–54)
EGFRCR SERPLBLD CKD-EPI 2021: 82.8 ML/MIN/1.73
EOSINOPHIL # BLD AUTO: 0.82 10*3/MM3 (ref 0–0.4)
EOSINOPHIL NFR BLD AUTO: 12.9 % (ref 0.3–6.2)
ERYTHROCYTE [DISTWIDTH] IN BLOOD BY AUTOMATED COUNT: 12.8 % (ref 12.3–15.4)
GLOBULIN UR ELPH-MCNC: 3.2 GM/DL
GLUCOSE SERPL-MCNC: 80 MG/DL (ref 65–99)
HCT VFR BLD AUTO: 49.4 % (ref 37.5–51)
HDLC SERPL-MCNC: 75 MG/DL (ref 40–60)
HGB BLD-MCNC: 17.2 G/DL (ref 13–17.7)
IMM GRANULOCYTES # BLD AUTO: 0.02 10*3/MM3 (ref 0–0.05)
IMM GRANULOCYTES NFR BLD AUTO: 0.3 % (ref 0–0.5)
LDLC SERPL CALC-MCNC: 56 MG/DL (ref 0–100)
LDLC/HDLC SERPL: 0.77 {RATIO}
LYMPHOCYTES # BLD AUTO: 2.31 10*3/MM3 (ref 0.7–3.1)
LYMPHOCYTES NFR BLD AUTO: 36.3 % (ref 19.6–45.3)
MCH RBC QN AUTO: 33 PG (ref 26.6–33)
MCHC RBC AUTO-ENTMCNC: 34.8 G/DL (ref 31.5–35.7)
MCV RBC AUTO: 94.8 FL (ref 79–97)
MONOCYTES # BLD AUTO: 0.96 10*3/MM3 (ref 0.1–0.9)
MONOCYTES NFR BLD AUTO: 15.1 % (ref 5–12)
NEUTROPHILS NFR BLD AUTO: 2.19 10*3/MM3 (ref 1.7–7)
NEUTROPHILS NFR BLD AUTO: 34.5 % (ref 42.7–76)
NRBC BLD AUTO-RTO: 0 /100 WBC (ref 0–0.2)
PLATELET # BLD AUTO: 272 10*3/MM3 (ref 140–450)
PMV BLD AUTO: 10.2 FL (ref 6–12)
POTASSIUM SERPL-SCNC: 4.2 MMOL/L (ref 3.5–5.2)
PROT SERPL-MCNC: 7.4 G/DL (ref 6–8.5)
RBC # BLD AUTO: 5.21 10*6/MM3 (ref 4.14–5.8)
SODIUM SERPL-SCNC: 141 MMOL/L (ref 136–145)
TRIGL SERPL-MCNC: 46 MG/DL (ref 0–150)
VLDLC SERPL-MCNC: 11 MG/DL (ref 5–40)
WBC NRBC COR # BLD AUTO: 6.36 10*3/MM3 (ref 3.4–10.8)

## 2025-05-22 PROCEDURE — 80053 COMPREHEN METABOLIC PANEL: CPT

## 2025-05-22 PROCEDURE — 36415 COLL VENOUS BLD VENIPUNCTURE: CPT

## 2025-05-22 PROCEDURE — 80061 LIPID PANEL: CPT

## 2025-05-22 PROCEDURE — 85025 COMPLETE CBC W/AUTO DIFF WBC: CPT

## 2025-05-22 RX ORDER — BACITRACIN ZINC 500 [USP'U]/G
1 OINTMENT TOPICAL 2 TIMES DAILY
Qty: 28.4 G | Refills: 2 | Status: SHIPPED | OUTPATIENT
Start: 2025-05-22

## 2025-05-22 RX ORDER — NYSTATIN AND TRIAMCINOLONE ACETONIDE 100000; 1 [USP'U]/G; MG/G
1 OINTMENT TOPICAL 2 TIMES DAILY
Qty: 15 G | Refills: 0 | Status: SHIPPED | OUTPATIENT
Start: 2025-05-22

## 2025-05-22 NOTE — PROGRESS NOTES
Chief Complaint  Allergies (Would like referral to an allergist - interested in neubulizer) and Arthritis (Hand pain - OTC cream helps some but doesn't help all the time )     Subjective:      History of Present Illness {CC  Problem List  Visit  Diagnosis   Encounters  Notes  Medications  Labs  Result Review Imaging  Media :23}     Barrett Arriaza presents to [facility] for:    Patient or patient representative verbalized consent for the use of Ambient Listening during the visit with  REJI Manuel for chart documentation. 6/5/2025  07:42 EDT     History of Present Illness      The patient presents for evaluation of arthritis, COPD, allergies, rash, and hepatitis C.    He reports experiencing pain in his hands, which he attributes to arthritis. He has been using Aspercreme for relief but finds it ineffective. He has previously undergone testing for carpal tunnel syndrome and is scheduled for surgical intervention. However, he does not believe this condition is causing his current discomfort.    He has been adhering to his prescribed inhaler regimen, which he finds beneficial. Despite this, he continues to experience respiratory distress. He has been advised to consult with his primary care physician regarding the need for a breathing machine, a recommendation made during his hospital stay. He has previously consulted with a pulmonologist but has not returned for follow-up visits.    He is seeking a referral to an allergist due to severe ocular pruritus. He has been using Flonase and has attempted to manage his symptoms with Benadryl, but this medication induces fatigue and drowsiness, impairing his daily functioning. He has reduced his smoking habit to approximately 3 cigarettes per day and uses nicotine patches at night.    He reports developing a rash 3 to 4 days after bowel movements.He does not report any constipation. He is uncertain if these symptoms  are related to his diet or fluid intake. He has been using diaper rash cream for relief.    He has tested positive for hepatitis C and has consulted with an infectious disease specialist. He has a history of incarceration but reports no drug use or needle sharing. He has tattoos that he received while he was incarcerated which he does believe was the cause for his Hepatitis C.     He was hospitalized on 04/13/2025 due to chest pain and respiratory distress, which he believes were stress and anxiety-related. He has been experiencing significant stress and anxiety recently.    SOCIAL HISTORY  The patient admits to smoking approximately 3 cigarettes a day. He has a history of incarceration but reports no drug use or needle sharing.     Past Medical History:   Diagnosis Date    AAA (abdominal aortic aneurysm)     Alcohol use disorder, severe, in early remission 03/09/2023    Stable Length (duration) of remission: 30 DAYS In the event of cravings or relapse, resource reviewed: Alcoholics Anonymous [email or web address].aa.org/ Follow up Provider: PCP, Medication Assisted Treatment (MAT) Program and Other Follow up in: One month    Alcohol withdrawal syndrome without complication 06/16/2018    Asbestos exposure     Carpal tunnel syndrome of right wrist 03/26/2022    SEEN AT [facility]    Cervical radiculopathy 08/2021    Cervical spondylosis     Chest pain 11/11/2020    CARDIAC CATH DONE [facility]  - NORMAL    Chronic pain 08/28/2020    Chronic post-traumatic stress disorder 06/08/2021    Closed head injury without loss of consciousness 06/11/2023    SEEN AT [facility]    Cocaine abuse 01/01/1995    Colitis 09/12/2016    SEEN AT [facility]    Colon polyps     FOLLOWED BY DR. LAILA JEAN-BAPTISTE    Coronary artery vasospasm 11/12/2020    DDD (degenerative disc disease), lumbar     Deep vein thrombosis (DVT) of left lower extremity 02/05/2019    Elevated liver enzymes 10/2021    Excessive daytime sleepiness 12/21/2023    Glaucoma     Hallucinations  10/26/2021    SEEN AT [facility]    Hemorrhoids     Herniated lumbar intervertebral disc 08/2020    History of testicular cancer     Irritable bowel syndrome with diarrhea 12/19/2023    Left varicocele 02/04/2019    SEEN AT [facility]    Lumbar paraspinal muscle spasm 07/2020    Malnutrition 10/2023    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 GARCIA    Rectal bleeding 02/15/2022    SEEN AT [facility]    Schizoaffective disorder, bipolar type 02/02/2022    Condition: stable Last mental health visit 3/8/23 [facility] Medications: Taking medications as prescribed If taking medications, do not stop treatment without consulting healthcare provider. If symptoms worsen or do not improve/stabilize, notify health care provider right away. If thoughts of harming s    Snoring 12/18/2023    Sprain of cervical neck 06/2023    Substance abuse     Syncope and collapse 02/05/2019     Family History   Problem Relation Age of Onset    Hypertension Mother     Asthma Mother     Diabetes Mother     Sleep apnea Mother     Cancer Father     Colon cancer Father     Diabetes Sister     Sleep apnea Sister     Hypertension Brother     Asthma Brother     Sleep apnea Brother     Malig Hyperthermia Neg Hx      Social History     Tobacco Use   Smoking Status Every Day    Current packs/day: 1.00    Average packs/day: 0.8 packs/day for 98.5 years (81.0 ttl pk-yrs)    Types: Cigarettes    Start date: 1/1/1979    Passive exposure: Current   Smokeless Tobacco Never     Social History     Substance and Sexual Activity   Alcohol Use Yes    Alcohol/week: 1.0 standard drink of alcohol    Types: 1 Shots of liquor per week         I have reviewed patient's medical history, any new submitted information provided by patient or medical assistant and updated medical record.      Objective:       Physical Exam  Vitals reviewed.   Constitutional:       General: He is awake. He is not in acute distress.     Appearance: Normal appearance. He is well-groomed. He is not ill-appearing, toxic-appearing or diaphoretic.   HENT:      Head: Normocephalic.      Right Ear: Hearing normal.      Left Ear: Hearing normal.      Nose: Rhinorrhea present. Rhinorrhea is clear.      Right Sinus: No maxillary sinus tenderness or frontal sinus tenderness.      Left Sinus: No maxillary sinus tenderness or frontal sinus tenderness.      Mouth/Throat:      Lips: Pink.      Mouth: Mucous membranes are moist.   Eyes:      General: Lids are normal. Vision grossly intact.      Conjunctiva/sclera: Conjunctivae normal.      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      Heart sounds: Normal heart sounds.   Pulmonary:      Effort: Pulmonary effort is normal. No respiratory distress.      Breath sounds: Examination of the right-lower field reveals decreased breath sounds. Examination of the left-lower field reveals decreased breath sounds. Decreased breath sounds present. No wheezing, rhonchi or rales.   Musculoskeletal:      Right hand: Bony tenderness present. Decreased range of motion. Decreased strength. Normal capillary refill. Normal pulse.      Left hand: Bony tenderness present. Decreased range of motion. Decreased strength. Normal capillary refill. Normal pulse.   Lymphadenopathy:      Cervical: No cervical adenopathy.      Right cervical: No superficial, deep or posterior cervical adenopathy.     Left cervical: No superficial, deep or posterior cervical adenopathy.      Upper Body:      Right upper body: No supraclavicular adenopathy.      Left upper body: No supraclavicular adenopathy.   Skin:     General: Skin is warm and dry.      Capillary Refill: Capillary refill takes less than 2 seconds.      Findings: Rash present.             Comments:  Circular, raised pruritic rash   Neurological:      General: No focal deficit present.      Mental Status: He is alert and oriented to person, place, and time. Mental status is at baseline.      Motor: Motor function is intact.      Coordination: Coordination is intact.   Psychiatric:         Attention and Perception: Attention and perception normal.         Mood and Affect: Mood and affect normal.         Speech: Speech normal.         Behavior: Behavior normal. Behavior is cooperative.         Thought Content: Thought content normal.         Cognition and Memory: Cognition and memory normal.         Judgment: Judgment normal.        Result Review  Data Reviewed:{ Labs  Result Review  Imaging  Med Tab  Media :23}     Results  Labs   - Liver Enzymes: 04/13/2025, Elevated   - Urinalysis: 04/13/2025, Presence of ketones       The following data was reviewed by: REJI Manuel on 05/22/2025  CMP          1/4/2025    01:20 4/13/2025    12:06 5/22/2025    08:40   CMP   Glucose 108  94  80    BUN 10  9  10    Creatinine 0.86  0.89  1.05    EGFR 101.0  100.0  82.8    Sodium 137  141  141    Potassium 3.5  3.8  4.2    Chloride 103  102  102    Calcium 9.2  10.3  9.8    Total Protein 7.4  8.4  7.4    Albumin 4.2  4.8  4.2    Globulin 3.2  3.6  3.2    Total Bilirubin 0.6  1.0  0.6    Alkaline Phosphatase 112  114  116    AST (SGOT) 26  49  30    ALT (SGPT) 28  43  26    Albumin/Globulin Ratio 1.3  1.3  1.3    BUN/Creatinine Ratio 11.6  10.1  9.5    Anion Gap 8.1  12.0  9.5      CBC          1/4/2025    01:20 4/13/2025    12:06 5/22/2025    08:40   CBC   WBC 5.61  6.91  6.36    RBC 5.11  5.34  5.21    Hemoglobin 16.4  17.6  17.2    Hematocrit 47.8  49.9  49.4    MCV 93.5  93.4  94.8    MCH 32.1  33.0  33.0    MCHC 34.3  35.3  34.8    RDW 13.0  12.8  12.8    Platelets 256  282  272      CBC w/diff          9/27/2024    07:32 1/4/2025    01:20 4/13/2025    12:06   CBC w/Diff   WBC 4.85  5.61  6.91    RBC 5.19  5.11   "5.34    Hemoglobin 16.7  16.4  17.6    Hematocrit 48.4  47.8  49.9    MCV 93.3  93.5  93.4    MCH 32.2  32.1  33.0    MCHC 34.5  34.3  35.3    RDW 12.7  13.0  12.8    Platelets 251  256  282    Neutrophil Rel % 28.7  31.7  58.8    Immature Granulocyte Rel % 0.2  0.2  0.3    Lymphocyte Rel % 43.1  43.9  22.3    Monocyte Rel % 17.7  16.0  16.6    Eosinophil Rel % 9.5  8.0  1.4    Basophil Rel % 0.8  0.2  0.6      Lipid Panel          9/27/2024    07:32 5/22/2025    08:40   Lipid Panel   Total Cholesterol 145  142    Triglycerides 46  46    HDL Cholesterol 65  75    VLDL Cholesterol 10  11    LDL Cholesterol  70  56    LDL/HDL Ratio 1.09  0.77      TSH          9/27/2024    07:32   TSH   TSH 0.581      ED with Timothy Grajeda MD (04/13/2025)            Vital Signs:   /78 (BP Location: Left arm, Patient Position: Sitting, Cuff Size: Adult)   Pulse 74   Temp 96.9 °F (36.1 °C) (Temporal)   Ht 172.7 cm (67.99\")   Wt 70.3 kg (155 lb)   SpO2 97%   BMI 23.57 kg/m²             Requested Prescriptions     Signed Prescriptions Disp Refills    Diclofenac Sodium (VOLTAREN) 1 % gel gel 150 g 3     Sig: Apply 4 g topically to the appropriate area as directed 4 (Four) Times a Day As Needed (arthritis).    nystatin-triamcinolone (MYCOLOG) 239912-9.1 UNIT/GM-% ointment 15 g 0     Sig: Apply 1 Application topically to the appropriate area as directed 2 (Two) Times a Day.    bacitracin (RA Bacitracin) 500 UNIT/GM ointment 28.4 g 2     Sig: Apply 1 Application topically to the appropriate area as directed 2 (Two) Times a Day.       Routine medications provided by this office will also be refilled via pharmacy request.       Current Outpatient Medications:     albuterol sulfate  (90 Base) MCG/ACT inhaler, Inhale 2 puffs Every 4 (Four) Hours As Needed for Wheezing., Disp: 18 g, Rfl: 4    aspirin 81 MG EC tablet, Take 1 tablet by mouth Daily. HELD FOR OR, Disp: , Rfl:     benzonatate (TESSALON) 100 MG capsule, Take 1 " capsule by mouth 3 (Three) Times a Day As Needed for Cough., Disp: 30 capsule, Rfl: 0    budesonide-formoterol (Symbicort) 80-4.5 MCG/ACT inhaler, Inhale 2 puffs 2 (Two) Times a Day., Disp: 10.2 g, Rfl: 12    citalopram (CeleXA) 10 MG tablet, Take 1 tablet by mouth Daily., Disp: , Rfl:     fluticasone (FLONASE) 50 MCG/ACT nasal spray, Administer 2 sprays into the nostril(s) as directed by provider As Needed for Allergies or Rhinitis., Disp: 18.9 g, Rfl: 5    gabapentin (NEURONTIN) 100 MG capsule, Take  by mouth Daily., Disp: , Rfl:     nicotine (NICODERM CQ) 14 MG/24HR patch, Place 1 patch on the skin as directed by provider Daily., Disp: 30 patch, Rfl: 1    Nutritional Supplements (Boost High Protein) liquid, Take 240 mL by mouth., Disp: , Rfl:     QUEtiapine XR (SEROquel XR) 400 MG 24 hr tablet, Take 1 tablet by mouth Every Night., Disp: , Rfl:     terbinafine (LamISIL AT) 1 % cream, Apply 1 Application topically to the appropriate area as directed 2 (Two) Times a Day., Disp: 42 g, Rfl: 0    bacitracin (RA Bacitracin) 500 UNIT/GM ointment, Apply 1 Application topically to the appropriate area as directed 2 (Two) Times a Day., Disp: 28.4 g, Rfl: 2    Diclofenac Sodium (VOLTAREN) 1 % gel gel, Apply 4 g topically to the appropriate area as directed 4 (Four) Times a Day As Needed (arthritis)., Disp: 150 g, Rfl: 3    nystatin-triamcinolone (MYCOLOG) 905731-3.1 UNIT/GM-% ointment, Apply 1 Application topically to the appropriate area as directed 2 (Two) Times a Day., Disp: 15 g, Rfl: 0     Assessment and Plan:      Assessment and Plan {CC Problem List  Visit Diagnosis  ROS  Review (Popup)  Health Maintenance  Quality  BestPractice  Medications  SmartSets  SnapShot Encounters  Media :23}     Problem List Items Addressed This Visit       Tobacco abuse    Relevant Orders    Ambulatory Referral to Allergy    Ambulatory Referral to Pulmonology    COPD with exacerbation    Current Assessment & Plan   - Continues  to smoke approximately 3 cigarettes a day and uses nicotine patches at night.  - Currently using inhalers as prescribed, which are working well.  - Referral to pulmonologist made for further evaluation and management of COPD.  - Advised to quit smoking to improve respiratory health.         Chronic hepatitis C without hepatic coma    Current Assessment & Plan   - Tested positive for Hepatitis C and has not followed up with infectious disease for treatment.  - Advised to schedule an appointment with infectious disease for further management of Hepatitis C.  - Discussed potential sources of infection and the importance of treatment.  - Encouraged to follow up with infectious disease to prevent liver damage.         Arthritis (Chronic)    Current Assessment & Plan   - Reports joint pain in hands, attributed to arthritis. Over-the-counter creams have not been effective.  - Prescription for Voltaren gel issued to manage symptoms. Advised to apply gel to hands and allow it to absorb.  - Discussed the ineffectiveness of over-the-counter creams and the need for surgery for carpal tunnel.  - Voltaren gel sent to pharmacy.         Relevant Medications    Diclofenac Sodium (VOLTAREN) 1 % gel gel    Environmental and seasonal allergies    Overview   - Experiences severe itching in eyes and has been using Flonase. Benadryl makes him exhausted and sleepy, affecting daily functioning.  - Referral to allergist made for further evaluation and management of allergies.  - Discussed the ineffectiveness of Visine and the need for proper allergy management.  - Advised to avoid Visine and continue using Flonase.         Relevant Orders    Ambulatory Referral to Allergy    Ambulatory Referral to Pulmonology    Rectal irritation (Chronic)    Current Assessment & Plan   - Reports recurring rash appearing three to four days after using the bathroom.  - Tried various over-the-counter treatments without success.  - Prescription for zinc oxide  40% issued to manage rash.         Relevant Medications    bacitracin (RA Bacitracin) 500 UNIT/GM ointment     Other Visit Diagnoses         Routine health maintenance    -  Primary      Chronic cough  (Chronic)       Referral placed to Pulm and Allergist    Relevant Orders    Ambulatory Referral to Allergy    Ambulatory Referral to Pulmonology      Shortness of breath        Relevant Orders    Ambulatory Referral to Allergy    Ambulatory Referral to Pulmonology      Hepatitis C virus infection without hepatic coma, unspecified chronicity  (Chronic)       Relevant Orders    Comprehensive metabolic panel (Completed)    CBC w AUTO Differential (Completed)      Screening for cardiovascular condition        Relevant Orders    Lipid panel (Completed)      Rash        Use cream for the next 7 days, follow up with our office if it continues    Relevant Medications    Diclofenac Sodium (VOLTAREN) 1 % gel gel    nystatin-triamcinolone (MYCOLOG) 193595-8.1 UNIT/GM-% ointment    bacitracin (RA Bacitracin) 500 UNIT/GM ointment                 1. Arthritis.  - Reports joint pain in hands, attributed to arthritis. Over-the-counter creams have not been effective.  - Prescription for Voltaren gel issued to manage symptoms. Advised to apply gel to hands and allow it to absorb.  - Discussed the ineffectiveness of over-the-counter creams and the need for surgery for carpal tunnel.  - Voltaren gel sent to pharmacy.    2. Chronic Obstructive Pulmonary Disease (COPD).  - Continues to smoke approximately 3 cigarettes a day and uses nicotine patches at night.  - Currently using inhalers as prescribed, which are working well.  - Referral to pulmonologist made for further evaluation and management of COPD.  - Advised to quit smoking to improve respiratory health.    3. Allergies.  - Experiences severe itching in eyes and has been using Flonase. Benadryl makes him exhausted and sleepy, affecting daily functioning.  - Referral to allergist  made for further evaluation and management of allergies.  - Discussed the ineffectiveness of Visine and the need for proper allergy management.  - Advised to avoid Visine and continue using Flonase.    4. Rash.  - Reports recurring rash appearing three to four days after using the bathroom, accompanied by sweating and weakness.  - Tried various over-the-counter treatments without success.  - Prescription for zinc oxide 40% issued to manage rash.    5. Hepatitis C.  - Tested positive for Hepatitis C and has not followed up with infectious disease for treatment.  - Advised to schedule an appointment with infectious disease for further management of Hepatitis C.  - Discussed potential sources of infection and the importance of treatment.  - Encouraged to follow up with infectious disease to prevent liver damage.    6. Health Maintenance.  - Due for pneumonia, tetanus, and shingles vaccines.  - Liver enzymes were elevated during last hospital visit on 04/13/2025, and urine showed ketones.  - Blood pressure is normal today.  - Labs will be repeated today to monitor liver enzymes and ketones.  - Comprehensive set of labs, including cholesterol, CBC, and CMP, will be conducted today.    Follow-up  - Follow up in 3 months.     Today we have placed a referral or ordered further testing:     A team member from [facility] will contact you within the next 3-5 business days to schedule your tests or referral.    If you have not heard them within this time frame, they can be contacted at [phone]    If it was an outside referral: contact our office if you have not been contacted for appointment after 7-10 business days.      Please complete your lab work today. Following review of results our office will be in contact with you for follow up care, medication changes or further instructions if needed. At that time if we need to schedule a follow up appointment one will be made at the time of the call.    Thank  you for allowing us to care for you,  REJI Manuel    Follow Up {Instructions Charge Capture  Follow-up Communications :23}     Return in about 4 months (around 9/21/2025) for Annual physical.      Patient was given instructions and counseling regarding his condition or for health maintenance advice. Please see specific information pulled into the AVS if appropriate.    I spent 72 minutes caring for Barrett on this date of service. This time includes time spent by me in the following activities:preparing for the visit, reviewing tests, obtaining and/or reviewing a separately obtained history, performing a medically appropriate examination and/or evaluation , counseling and educating the patient/family/caregiver, ordering medications, tests, or procedures, documenting information in the medical record, and reviewing previous referral and importance of receiving treatment.    Dragon disclaimer:   Much of this encounter note is an electronic transcription/translation of spoken language to printed text. The electronic translation of spoken language may permit erroneous, or at times, nonsensical words or phrases to be inadvertently transcribed; Although I have reviewed the note for such errors, some may still exist.     Additional Patient Counseling:       There are no Patient Instructions on file for this visit.

## 2025-05-23 ENCOUNTER — RESULTS FOLLOW-UP (OUTPATIENT)
Dept: INTERNAL MEDICINE | Facility: CLINIC | Age: 60
End: 2025-05-23
Payer: COMMERCIAL

## 2025-05-23 NOTE — PROGRESS NOTES
Mr. Arriaza,   I have reviewed your labs. Your CMP is stable with a slight elevation in your CO2 which is from smoking. Please try to reduce your smoking habit.  Your cholesterol is stable and looks good.   Please let us know if you have any further questions or concerns.  Thank you for allowing us to care for you,  REJI Manuel     03-Aug-2019 04:44

## 2025-05-27 NOTE — TELEPHONE ENCOUNTER
Called pt and phone rang -     Hub to Relay     I have reviewed your labs. Your CMP is stable with a slight elevation in your CO2 which is from smoking. Please try to reduce your smoking habit.  Your cholesterol is stable and looks good.   Please let us know if you have any further questions or concerns.  Thank you for allowing us to care for you,

## 2025-05-28 NOTE — TELEPHONE ENCOUNTER
Name: Barrett Arriaza      Relationship: Self      Best Callback Number: [phone]      HUB PROVIDED THE RELAY MESSAGE FROM THE OFFICE      PATIENT: VOICED UNDERSTANDING AND HAS NO FURTHER QUESTIONS AT THIS TIME    ADDITIONAL INFORMATION:

## 2025-06-05 PROBLEM — J30.89 ENVIRONMENTAL AND SEASONAL ALLERGIES: Status: ACTIVE | Noted: 2025-06-05

## 2025-06-05 PROBLEM — T78.40XA ALLERGIES: Status: ACTIVE | Noted: 2025-06-05

## 2025-06-05 PROBLEM — M19.90 ARTHRITIS: Chronic | Status: ACTIVE | Noted: 2025-06-05

## 2025-06-05 PROBLEM — K62.89 RECTAL IRRITATION: Chronic | Status: ACTIVE | Noted: 2025-06-05

## 2025-06-05 NOTE — ASSESSMENT & PLAN NOTE
- Reports recurring rash appearing three to four days after using the bathroom.  - Tried various over-the-counter treatments without success.  - Prescription for zinc oxide 40% issued to manage rash.

## 2025-06-05 NOTE — ASSESSMENT & PLAN NOTE
- Reports joint pain in hands, attributed to arthritis. Over-the-counter creams have not been effective.  - Prescription for Voltaren gel issued to manage symptoms. Advised to apply gel to hands and allow it to absorb.  - Discussed the ineffectiveness of over-the-counter creams and the need for surgery for carpal tunnel.  - Voltaren gel sent to pharmacy.

## 2025-06-05 NOTE — ASSESSMENT & PLAN NOTE
- Continues to smoke approximately 3 cigarettes a day and uses nicotine patches at night.  - Currently using inhalers as prescribed, which are working well.  - Referral to pulmonologist made for further evaluation and management of COPD.  - Advised to quit smoking to improve respiratory health.

## 2025-06-13 ENCOUNTER — TRANSCRIBE ORDERS (OUTPATIENT)
Dept: ADMINISTRATIVE | Facility: HOSPITAL | Age: 60
End: 2025-06-13
Payer: COMMERCIAL

## 2025-06-13 DIAGNOSIS — C61 PROSTATE CANCER: Primary | ICD-10-CM

## 2025-06-23 ENCOUNTER — TELEPHONE (OUTPATIENT)
Dept: INFECTIOUS DISEASES | Facility: CLINIC | Age: 60
End: 2025-06-23
Payer: COMMERCIAL

## 2025-06-23 NOTE — TELEPHONE ENCOUNTER
I called patient to remind him about the lab orders Dr. Maldonado had placed w/ his office visit in November. He said he had the labs done when he saw his PCP in May. I reviewed his chart and told him I only saw that 2 of the 7 labs that Dr. Maldonado had ordered had been ordered by his PCP. Patient asked when his next office visit w/ Dr. Maldonado was. I told him that I did not see that a f/u appt had been indicated yet and that Dr. Maldonado would probably need to have all the labs done in order go ahead and schedule an office visit to discuss the results and a treatment plan. Patient stated that he would try to have the labs done tomorrow.

## 2025-07-03 ENCOUNTER — HOSPITAL ENCOUNTER (OUTPATIENT)
Dept: PET IMAGING | Facility: HOSPITAL | Age: 60
Discharge: HOME | End: 2025-07-03
Payer: COMMERCIAL

## 2025-07-03 ENCOUNTER — OFFICE VISIT (OUTPATIENT)
Dept: INTERNAL MEDICINE | Facility: CLINIC | Age: 60
End: 2025-07-03
Payer: COMMERCIAL

## 2025-07-03 VITALS
BODY MASS INDEX: 23.19 KG/M2 | WEIGHT: 153 LBS | SYSTOLIC BLOOD PRESSURE: 118 MMHG | DIASTOLIC BLOOD PRESSURE: 78 MMHG | HEIGHT: 68 IN | HEART RATE: 85 BPM | TEMPERATURE: 97.1 F | OXYGEN SATURATION: 98 %

## 2025-07-03 DIAGNOSIS — F25.0 SCHIZOAFFECTIVE DISORDER, BIPOLAR TYPE: ICD-10-CM

## 2025-07-03 DIAGNOSIS — M79.604 BILATERAL LEG PAIN: ICD-10-CM

## 2025-07-03 DIAGNOSIS — M79.605 BILATERAL LEG PAIN: ICD-10-CM

## 2025-07-03 DIAGNOSIS — C61 PROSTATE CANCER: ICD-10-CM

## 2025-07-03 DIAGNOSIS — F33.1 RECURRENT MAJOR DEPRESSIVE EPISODES, MODERATE: Primary | ICD-10-CM

## 2025-07-03 PROCEDURE — 34310000005 FLOTUFOLASTAT F-18 296-5846 MBQ/ML SOLUTION: Performed by: UROLOGY

## 2025-07-03 PROCEDURE — 1125F AMNT PAIN NOTED PAIN PRSNT: CPT

## 2025-07-03 PROCEDURE — A9608 FLOTUFOLASTAT F-18 296-5846 MBQ/ML SOLUTION: HCPCS | Performed by: UROLOGY

## 2025-07-03 PROCEDURE — 1160F RVW MEDS BY RX/DR IN RCRD: CPT

## 2025-07-03 PROCEDURE — 1159F MED LIST DOCD IN RCRD: CPT

## 2025-07-03 PROCEDURE — 78815 PET IMAGE W/CT SKULL-THIGH: CPT

## 2025-07-03 PROCEDURE — 99214 OFFICE O/P EST MOD 30 MIN: CPT

## 2025-07-03 RX ORDER — EPINEPHRINE 0.3 MG/.3ML
INJECTION SUBCUTANEOUS
COMMUNITY
Start: 2025-06-03

## 2025-07-03 RX ORDER — ALBUTEROL SULFATE AND BUDESONIDE 90; 80 UG/1; UG/1
AEROSOL, METERED RESPIRATORY (INHALATION)
COMMUNITY
Start: 2025-06-09

## 2025-07-03 RX ORDER — CETIRIZINE HYDROCHLORIDE 10 MG/1
1 TABLET ORAL DAILY
COMMUNITY
Start: 2025-06-03

## 2025-07-03 RX ORDER — AZELASTINE 1 MG/ML
SPRAY, METERED NASAL
COMMUNITY
Start: 2025-06-03

## 2025-07-03 RX ADMIN — FLOTUFOLASTAT F-18 1 DOSE: 158 INJECTION INTRAVENOUS at 11:18

## 2025-07-03 NOTE — PROGRESS NOTES
"        Chief Complaint  Leg Pain (X4 days following allergy shots - legs feel heavy non stop, achy, feeling \"muscles in leg\" no tingling. Nothing OTC )     Subjective:      History of Present Illness {CC  Problem List  Visit  Diagnosis   Encounters  Notes  Medications  Labs  Result Review Imaging  Media :23}     Barrett Arriaza presents to [facility] for:    Patient or patient representative verbalized consent for the use of Ambient Listening during the visit with  REJI Manuel for chart documentation. 7/3/2025  08:10 EDT     History of Present Illness          The patient presents for evaluation of leg pain, depression, and asthma.    He reports severe bilateral leg pain, which he attributes to discontinuing gabapentin approximately 4 to 5 months ago, believing it was unnecessary. He has not engaged in any strenuous activities such as exercise, lifting, or pulling that could potentially exacerbate his symptoms.    He expresses feelings of depression, citing an inability to continue outdoor work due to his health conditions. He has sought help from [facility] but feels overwhelmed by their process and the constant communication regarding his mental state. He reports no suicidal ideation or intent to harm others. He has a history of bipolar disorder and schizophrenia. He is currently under significant stress due to financial obligations, including child support payments, and recent bereavement following his mother's death two weeks ago. He is seeking a referral to [facility] or the [facility] for further evaluation and management.    He has been diagnosed with severe asthma by an allergist and is currently on medication for this condition. He experiences difficulty breathing when exposed to outdoor environments for extended periods, particularly during physical exertion. He also reports a sensation of overheating. Attempts to mitigate these symptoms with a mask " have been unsuccessful due to associated breathing difficulties.     Past Medical History:   Diagnosis Date    AAA (abdominal aortic aneurysm)     Alcohol use disorder, severe, in early remission 03/09/2023    Stable Length (duration) of remission: 30 DAYS In the event of cravings or relapse, resource reviewed: Alcoholics Anonymous https://www.aa.org/ Follow up Provider: PCP, Medication Assisted Treatment (MAT) Program and Other Follow up in: One month    Alcohol withdrawal syndrome without complication 06/16/2018    Asbestos exposure     Carpal tunnel syndrome of right wrist 03/26/2022    SEEN AT [facility]    Cervical radiculopathy 08/2021    Cervical spondylosis     Chest pain 11/11/2020    CARDIAC CATH DONE [facility]  - NORMAL    Chronic pain 08/28/2020    Chronic post-traumatic stress disorder 06/08/2021    Closed head injury without loss of consciousness 06/11/2023    SEEN AT [facility]    Cocaine abuse 01/01/1995    Colitis 09/12/2016    SEEN AT [facility]    Colon polyps     FOLLOWED BY DR. LAILA JEAN-BAPTISTE    Coronary artery vasospasm 11/12/2020    DDD (degenerative disc disease), lumbar     Deep vein thrombosis (DVT) of left lower extremity 02/05/2019    Elevated liver enzymes 10/2021    Excessive daytime sleepiness 12/21/2023    Glaucoma     Hallucinations 10/26/2021    SEEN AT [facility]    Hemorrhoids     Herniated lumbar intervertebral disc 08/2020    History of testicular cancer     Irritable bowel syndrome with diarrhea 12/19/2023    Left varicocele 02/04/2019    SEEN AT 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 GARCIA    Rectal bleeding 02/15/2022    SEEN AT [facility]    Schizoaffective disorder, bipolar type 02/02/2022    Condition: stable Last mental health visit 3/8/23 [place]  [facility] Medications: Taking medications as prescribed If taking medications, do not stop treatment without consulting healthcare provider. If symptoms worsen or do not improve/stabilize, notify health care provider right away. If thoughts of harming s    Snoring 12/18/2023    Sprain of cervical neck 06/2023    Substance abuse     Syncope and collapse 02/05/2019     Family History   Problem Relation Age of Onset    Hypertension Mother     Asthma Mother     Diabetes Mother     Sleep apnea Mother     Cancer Father     Colon cancer Father     Diabetes Sister     Sleep apnea Sister     Hypertension Brother     Asthma Brother     Sleep apnea Brother     Malig Hyperthermia Neg Hx      Social History     Tobacco Use   Smoking Status Every Day    Current packs/day: 1.00    Average packs/day: 0.8 packs/day for 98.6 years (81.1 ttl pk-yrs)    Types: Cigarettes    Start date: 1/1/1979    Passive exposure: Current   Smokeless Tobacco Never     Social History     Substance and Sexual Activity   Alcohol Use Yes    Alcohol/week: 1.0 standard drink of alcohol    Types: 1 Shots of liquor per week       I have reviewed patient's medical history, any new submitted information provided by patient or medical assistant and updated medical record.      Objective:      Physical Exam  Vitals reviewed.   Constitutional:       General: He is awake. He is in acute distress.      Appearance: Normal appearance. He is not ill-appearing, toxic-appearing or diaphoretic.   HENT:      Head: Normocephalic.      Right Ear: Hearing normal.      Left Ear: Hearing normal.      Nose: Nose normal.      Mouth/Throat:      Lips: Pink.      Mouth: Mucous membranes are moist.   Eyes:      General: Lids are normal. Vision grossly intact.      Conjunctiva/sclera: Conjunctivae normal.      Comments: Glass eye present in right eye   Cardiovascular:      Rate and Rhythm: Normal rate.   Pulmonary:      Effort: Pulmonary effort is normal.   Neurological:       Mental Status: He is alert and oriented to person, place, and time.   Psychiatric:         Attention and Perception: He is inattentive.         Mood and Affect: Mood is anxious and depressed. Affect is tearful.         Speech: Speech normal.         Behavior: Behavior normal. Behavior is cooperative.         Thought Content: Thought content is not paranoid or delusional. Thought content does not include homicidal or suicidal ideation. Thought content does not include homicidal or suicidal plan.         Judgment: Judgment normal.      Comments: Patient refused KALI-7 and PHQ-9.  Patient denies SI HI today in office        Result Review  Data Reviewed:{ Labs  Result Review  Imaging  Med Tab  Media :23}     Results         The following data was reviewed by: REJI Manuel on 07/03/2025  CMP          1/4/2025    01:20 4/13/2025    12:06 5/22/2025    08:40   CMP   Glucose 108  94  80    BUN 10  9  10    Creatinine 0.86  0.89  1.05    EGFR 101.0  100.0  82.8    Sodium 137  141  141    Potassium 3.5  3.8  4.2    Chloride 103  102  102    Calcium 9.2  10.3  9.8    Total Protein 7.4  8.4  7.4    Albumin 4.2  4.8  4.2    Globulin 3.2  3.6  3.2    Total Bilirubin 0.6  1.0  0.6    Alkaline Phosphatase 112  114  116    AST (SGOT) 26  49  30    ALT (SGPT) 28  43  26    Albumin/Globulin Ratio 1.3  1.3  1.3    BUN/Creatinine Ratio 11.6  10.1  9.5    Anion Gap 8.1  12.0  9.5      CBC          1/4/2025    01:20 4/13/2025    12:06 5/22/2025    08:40   CBC   WBC 5.61  6.91  6.36    RBC 5.11  5.34  5.21    Hemoglobin 16.4  17.6  17.2    Hematocrit 47.8  49.9  49.4    MCV 93.5  93.4  94.8    MCH 32.1  33.0  33.0    MCHC 34.3  35.3  34.8    RDW 13.0  12.8  12.8    Platelets 256  282  272      CBC w/diff          1/4/2025    01:20 4/13/2025    12:06 5/22/2025    08:40   CBC w/Diff   WBC 5.61  6.91  6.36    RBC 5.11  5.34  5.21    Hemoglobin 16.4  17.6  17.2    Hematocrit 47.8  49.9  49.4    MCV 93.5  93.4  94.8    MCH 32.1  33.0  " 33.0    MCHC 34.3  35.3  34.8    RDW 13.0  12.8  12.8    Platelets 256  282  272    Neutrophil Rel % 31.7  58.8  34.5    Immature Granulocyte Rel % 0.2  0.3  0.3    Lymphocyte Rel % 43.9  22.3  36.3    Monocyte Rel % 16.0  16.6  15.1    Eosinophil Rel % 8.0  1.4  12.9    Basophil Rel % 0.2  0.6  0.9      Lipid Panel          9/27/2024    07:32 5/22/2025    08:40   Lipid Panel   Total Cholesterol 145  142    Triglycerides 46  46    HDL Cholesterol 65  75    VLDL Cholesterol 10  11    LDL Cholesterol  70  56    LDL/HDL Ratio 1.09  0.77      TSH          9/27/2024    07:32   TSH   TSH 0.581                 Vital Signs:   /78 (BP Location: Left arm, Patient Position: Sitting, Cuff Size: Adult)   Pulse 85   Temp 97.1 °F (36.2 °C) (Temporal)   Ht 172.7 cm (67.99\")   Wt 69.4 kg (153 lb)   SpO2 98%   BMI 23.27 kg/m²     BMI is within normal parameters. No other follow-up for BMI required.             Requested Prescriptions      No prescriptions requested or ordered in this encounter       Routine medications provided by this office will also be refilled via pharmacy request.       Current Outpatient Medications:     Airsupra 90-80 MCG/ACT aerosol, INHALE 2 PUFFS EVERY 4 HRS AS NEEDED COUGH OR WHEEZING RINSE MOUTH AFTER EACH USE, Disp: , Rfl:     albuterol sulfate  (90 Base) MCG/ACT inhaler, Inhale 2 puffs Every 4 (Four) Hours As Needed for Wheezing., Disp: 18 g, Rfl: 4    aspirin 81 MG EC tablet, Take 1 tablet by mouth Daily. HELD FOR OR, Disp: , Rfl:     azelastine (ASTELIN) 0.1 % nasal spray, USE 1 TO 2 SPRAYS IN EACH NOSTRIL TWICE DAILY AS NEEDED, Disp: , Rfl:     bacitracin (RA Bacitracin) 500 UNIT/GM ointment, Apply 1 Application topically to the appropriate area as directed 2 (Two) Times a Day., Disp: 28.4 g, Rfl: 2    budesonide-formoterol (Symbicort) 80-4.5 MCG/ACT inhaler, Inhale 2 puffs 2 (Two) Times a Day., Disp: 10.2 g, Rfl: 12    cetirizine (zyrTEC) 10 MG tablet, Take 1 tablet by mouth " Daily., Disp: , Rfl:     citalopram (CeleXA) 10 MG tablet, Take 1 tablet by mouth Daily., Disp: , Rfl:     Diclofenac Sodium (VOLTAREN) 1 % gel gel, Apply 4 g topically to the appropriate area as directed 4 (Four) Times a Day As Needed (arthritis)., Disp: 150 g, Rfl: 3    EPINEPHrine (EPIPEN) 0.3 MG/0.3ML solution auto-injector injection, USE AS DIRECTED FOR ACUTE ALLERGIC REACTION., Disp: , Rfl:     gabapentin (NEURONTIN) 100 MG capsule, Take  by mouth Daily., Disp: , Rfl:     nicotine (NICODERM CQ) 14 MG/24HR patch, Place 1 patch on the skin as directed by provider Daily., Disp: 30 patch, Rfl: 1    Nutritional Supplements (Boost High Protein) liquid, Take 240 mL by mouth., Disp: , Rfl:     nystatin-triamcinolone (MYCOLOG) 285002-3.1 UNIT/GM-% ointment, Apply 1 Application topically to the appropriate area as directed 2 (Two) Times a Day., Disp: 15 g, Rfl: 0    QUEtiapine XR (SEROquel XR) 400 MG 24 hr tablet, Take 1 tablet by mouth Every Night., Disp: , Rfl:     terbinafine (LamISIL AT) 1 % cream, Apply 1 Application topically to the appropriate area as directed 2 (Two) Times a Day., Disp: 42 g, Rfl: 0     Assessment and Plan:      Assessment and Plan {CC Problem List  Visit Diagnosis  ROS  Review (Popup)  Health Maintenance  Quality  BestPractice  Medications  SmartSets  SnapShot Encounters  Media :23}     Problem List Items Addressed This Visit       Schizoaffective disorder, bipolar type    Overview   Last Assessment & Plan: Formatting of this note might be different from the original. Condition: stable Last mental health visit 3/8/23 [facility] Medications: Taking medications as prescribed If taking medications, do not stop treatment without consulting healthcare provider. If symptoms worsen or do not improve/stabilize, notify health care provider right away. If thoughts of harming self or others notify health care provider immediately &/or seek urgent/emergent care including calling  Suicide Hotline (988 or 1-299.247.2408) or 911. Follow up in one month with Psychologist/Counselor/SupportGroup/Psychiatrist         Relevant Orders    Ambulatory Referral to Psychiatry (Completed)    Recurrent major depressive episodes, moderate - Primary    Relevant Orders    Ambulatory Referral to Psychiatry (Completed)     Other Visit Diagnoses         Bilateral leg pain                     1. Bilateral leg pain.  - Reports severe pain in both legs, attributed to discontinuing gabapentin approximately 4-5 months ago.  - Advised to resume taking gabapentin as prescribed by his neurologist to manage the leg pain.  - Physical exam findings indicate significant pain in the legs, likely due to neuropathic pain.  - Gabapentin prescribed to be resumed as per previous neurologist's instructions.    2. Depression.  - Experiencing significant depression, exacerbated by inability to work due to severe asthma and recent passing of his mother.  - History of bipolar disorder and schizophrenia, currently experiencing an episode triggered by depression.  - Advised to seek immediate evaluation at [facility] or [facility] for potential self-admission. Referral to [facility] initiated, and they have been informed of his impending arrival. Office notes and face sheet will be faxed to [facility].[phone]  - Encouraged to find a provider for ongoing mental health support and counseling.    3. Severe asthma.  - Diagnosed with severe asthma by an allergist, currently on medication for this condition.  - Experiences difficulty breathing when exposed to outdoor environments for extended periods, particularly during physical exertion.  - Reports sensation of overheating and unsuccessful attempts to mitigate symptoms with a mask due to associated breathing difficulties.  - Advised to avoid outdoor activities during extreme heat and to continue asthma management as per allergist's recommendations.     Patient  denies SI/ HI today in office.  Patient's wife is present during visit and is aware of need and necessity for patient to Self admit to the [facility] today. I have called and spoken to the [facility] triage RN, she is aware patient is to arrive today.  Information regarding today's visit and facesheet will be faxed to the [facility] per the request and with patient's permission.    Thank you for allowing us to care for you,  REJI Manuel    Follow Up {Instructions Charge Capture  Follow-up Communications :23}     Return if symptoms worsen or fail to improve.      Patient was given instructions and counseling regarding his condition or for health maintenance advice. Please see specific information pulled into the AVS if appropriate.        Dragon disclaimer:   Much of this encounter note is an electronic transcription/translation of spoken language to printed text. The electronic translation of spoken language may permit erroneous, or at times, nonsensical words or phrases to be inadvertently transcribed; Although I have reviewed the note for such errors, some may still exist.     Additional Patient Counseling:       Patient Instructions   Voiceit    Phone number  [phone] [id number]

## 2025-07-09 ENCOUNTER — TELEPHONE (OUTPATIENT)
Dept: INTERNAL MEDICINE | Facility: CLINIC | Age: 60
End: 2025-07-09

## 2025-07-14 ENCOUNTER — OFFICE VISIT (OUTPATIENT)
Dept: INTERNAL MEDICINE | Facility: CLINIC | Age: 60
End: 2025-07-14
Payer: COMMERCIAL

## 2025-07-14 VITALS
WEIGHT: 163 LBS | TEMPERATURE: 97.3 F | DIASTOLIC BLOOD PRESSURE: 90 MMHG | BODY MASS INDEX: 24.71 KG/M2 | SYSTOLIC BLOOD PRESSURE: 130 MMHG | HEIGHT: 68 IN | HEART RATE: 86 BPM | OXYGEN SATURATION: 98 %

## 2025-07-14 DIAGNOSIS — F25.0 SCHIZOAFFECTIVE DISORDER, BIPOLAR TYPE: Chronic | ICD-10-CM

## 2025-07-14 DIAGNOSIS — Z76.89 RETURN TO WORK EVALUATION: ICD-10-CM

## 2025-07-14 DIAGNOSIS — F31.70 BIPOLAR AFFECTIVE DISORDER IN REMISSION: Chronic | ICD-10-CM

## 2025-07-14 DIAGNOSIS — F33.1 RECURRENT MAJOR DEPRESSIVE EPISODES, MODERATE: Primary | ICD-10-CM

## 2025-07-14 PROBLEM — F25.9 SCHIZO-AFFECTIVE SCHIZOPHRENIA, CHRONIC CONDITION: Status: ACTIVE | Noted: 2025-07-14

## 2025-07-14 RX ORDER — SENNOSIDES AND DOCUSATE SODIUM 8.6; 5 MG/1; MG/1
TABLET ORAL
COMMUNITY
Start: 2025-07-09

## 2025-07-14 RX ORDER — PRAZOSIN HYDROCHLORIDE 1 MG/1
CAPSULE ORAL
COMMUNITY
Start: 2025-07-09

## 2025-07-14 RX ORDER — BUDESONIDE AND FORMOTEROL FUMARATE DIHYDRATE 160; 4.5 UG/1; UG/1
AEROSOL RESPIRATORY (INHALATION)
COMMUNITY
Start: 2025-07-09

## 2025-07-14 RX ORDER — QUETIAPINE FUMARATE 400 MG/1
TABLET, FILM COATED ORAL
COMMUNITY
Start: 2025-07-09

## 2025-07-14 RX ORDER — FLUTICASONE PROPIONATE 50 MCG
SPRAY, SUSPENSION (ML) NASAL
COMMUNITY
Start: 2025-07-09 | End: 2025-07-14

## 2025-07-14 RX ORDER — PRAZOSIN HYDROCHLORIDE 2 MG/1
CAPSULE ORAL
COMMUNITY
Start: 2025-07-09

## 2025-07-14 RX ORDER — QUETIAPINE FUMARATE 100 MG/1
100 TABLET, FILM COATED ORAL NIGHTLY
COMMUNITY

## 2025-07-14 RX ORDER — LEVOFLOXACIN 500 MG/1
TABLET, FILM COATED ORAL
COMMUNITY
Start: 2025-06-11
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
## 2025-07-27 PROBLEM — F31.70 BIPOLAR AFFECTIVE DISORDER IN REMISSION: Status: ACTIVE | Noted: 2025-07-27

## 2025-08-18 ENCOUNTER — APPOINTMENT (OUTPATIENT)
Dept: CT IMAGING | Facility: HOSPITAL | Age: 60
End: 2025-08-18
Payer: COMMERCIAL

## 2025-08-18 ENCOUNTER — HOSPITAL ENCOUNTER (EMERGENCY)
Facility: HOSPITAL | Age: 60
Discharge: HOME | End: 2025-08-18
Attending: EMERGENCY MEDICINE | Admitting: EMERGENCY MEDICINE
Payer: COMMERCIAL

## 2025-08-18 VITALS
SYSTOLIC BLOOD PRESSURE: 132 MMHG | WEIGHT: 160 LBS | RESPIRATION RATE: 14 BRPM | BODY MASS INDEX: 24.25 KG/M2 | OXYGEN SATURATION: 96 % | HEART RATE: 69 BPM | TEMPERATURE: 98 F | DIASTOLIC BLOOD PRESSURE: 77 MMHG | HEIGHT: 68 IN

## 2025-08-18 DIAGNOSIS — R10.2 SUPRAPUBIC PAIN: Primary | ICD-10-CM

## 2025-08-18 LAB
ALBUMIN SERPL-MCNC: 4.1 G/DL (ref 3.5–5.2)
ALBUMIN/GLOB SERPL: 1.3 G/DL
ALP SERPL-CCNC: 110 U/L (ref 39–117)
ALT SERPL W P-5'-P-CCNC: 34 U/L (ref 1–41)
ANION GAP SERPL CALCULATED.3IONS-SCNC: 10 MMOL/L (ref 5–15)
AST SERPL-CCNC: 34 U/L (ref 1–40)
BACTERIA UR QL AUTO: NORMAL /HPF
BASOPHILS # BLD AUTO: 0.04 10*3/MM3 (ref 0–0.2)
BASOPHILS NFR BLD AUTO: 0.7 % (ref 0–1.5)
BILIRUB SERPL-MCNC: 0.6 MG/DL (ref 0–1.2)
BILIRUB UR QL STRIP: NEGATIVE
BUN SERPL-MCNC: 11 MG/DL (ref 6–20)
BUN/CREAT SERPL: 12.2 (ref 7–25)
CALCIUM SPEC-SCNC: 9.2 MG/DL (ref 8.6–10.5)
CHLORIDE SERPL-SCNC: 104 MMOL/L (ref 98–107)
CLARITY UR: CLEAR
CO2 SERPL-SCNC: 24 MMOL/L (ref 22–29)
COLOR UR: YELLOW
CREAT SERPL-MCNC: 0.9 MG/DL (ref 0.76–1.27)
D-LACTATE SERPL-SCNC: 1 MMOL/L (ref 0.5–2)
DEPRECATED RDW RBC AUTO: 44.5 FL (ref 37–54)
EGFRCR SERPLBLD CKD-EPI 2021: 99.6 ML/MIN/1.73
EOSINOPHIL # BLD AUTO: 0.53 10*3/MM3 (ref 0–0.4)
EOSINOPHIL NFR BLD AUTO: 9.8 % (ref 0.3–6.2)
ERYTHROCYTE [DISTWIDTH] IN BLOOD BY AUTOMATED COUNT: 13 % (ref 12.3–15.4)
GLOBULIN UR ELPH-MCNC: 3.2 GM/DL
GLUCOSE SERPL-MCNC: 94 MG/DL (ref 65–99)
GLUCOSE UR STRIP-MCNC: NEGATIVE MG/DL
HCT VFR BLD AUTO: 47.3 % (ref 37.5–51)
HGB BLD-MCNC: 16.4 G/DL (ref 13–17.7)
HGB UR QL STRIP.AUTO: NEGATIVE
HOLD SPECIMEN: NORMAL
HOLD SPECIMEN: NORMAL
HYALINE CASTS UR QL AUTO: NORMAL /LPF
IMM GRANULOCYTES # BLD AUTO: 0.02 10*3/MM3 (ref 0–0.05)
IMM GRANULOCYTES NFR BLD AUTO: 0.4 % (ref 0–0.5)
KETONES UR QL STRIP: NEGATIVE
LEUKOCYTE ESTERASE UR QL STRIP.AUTO: ABNORMAL
LIPASE SERPL-CCNC: 23 U/L (ref 13–60)
LYMPHOCYTES # BLD AUTO: 2.16 10*3/MM3 (ref 0.7–3.1)
LYMPHOCYTES NFR BLD AUTO: 40 % (ref 19.6–45.3)
MCH RBC QN AUTO: 32.5 PG (ref 26.6–33)
MCHC RBC AUTO-ENTMCNC: 34.7 G/DL (ref 31.5–35.7)
MCV RBC AUTO: 93.8 FL (ref 79–97)
MONOCYTES # BLD AUTO: 0.94 10*3/MM3 (ref 0.1–0.9)
MONOCYTES NFR BLD AUTO: 17.4 % (ref 5–12)
NEUTROPHILS NFR BLD AUTO: 1.71 10*3/MM3 (ref 1.7–7)
NEUTROPHILS NFR BLD AUTO: 31.7 % (ref 42.7–76)
NITRITE UR QL STRIP: NEGATIVE
NRBC BLD AUTO-RTO: 0 /100 WBC (ref 0–0.2)
PH UR STRIP.AUTO: 5.5 [PH] (ref 5–8)
PLATELET # BLD AUTO: 217 10*3/MM3 (ref 140–450)
PMV BLD AUTO: 9.3 FL (ref 6–12)
POTASSIUM SERPL-SCNC: 4 MMOL/L (ref 3.5–5.2)
PROT SERPL-MCNC: 7.3 G/DL (ref 6–8.5)
PROT UR QL STRIP: NEGATIVE
RBC # BLD AUTO: 5.04 10*6/MM3 (ref 4.14–5.8)
RBC # UR STRIP: NORMAL /HPF
REF LAB TEST METHOD: NORMAL
SODIUM SERPL-SCNC: 138 MMOL/L (ref 136–145)
SP GR UR STRIP: 1.02 (ref 1–1.03)
SQUAMOUS #/AREA URNS HPF: NORMAL /HPF
UROBILINOGEN UR QL STRIP: ABNORMAL
WBC # UR STRIP: NORMAL /HPF
WBC NRBC COR # BLD AUTO: 5.4 10*3/MM3 (ref 3.4–10.8)
WHOLE BLOOD HOLD SPECIMEN: NORMAL

## 2025-08-18 PROCEDURE — 25010000002 HYDROMORPHONE PER 4 MG: Performed by: EMERGENCY MEDICINE

## 2025-08-18 PROCEDURE — 83690 ASSAY OF LIPASE: CPT | Performed by: EMERGENCY MEDICINE

## 2025-08-18 PROCEDURE — 80053 COMPREHEN METABOLIC PANEL: CPT | Performed by: EMERGENCY MEDICINE

## 2025-08-18 PROCEDURE — 74176 CT ABD & PELVIS W/O CONTRAST: CPT

## 2025-08-18 PROCEDURE — 81001 URINALYSIS AUTO W/SCOPE: CPT | Performed by: EMERGENCY MEDICINE

## 2025-08-18 PROCEDURE — 83605 ASSAY OF LACTIC ACID: CPT | Performed by: EMERGENCY MEDICINE

## 2025-08-18 PROCEDURE — 96375 TX/PRO/DX INJ NEW DRUG ADDON: CPT

## 2025-08-18 PROCEDURE — 96374 THER/PROPH/DIAG INJ IV PUSH: CPT

## 2025-08-18 PROCEDURE — 25010000002 ONDANSETRON PER 1 MG: Performed by: EMERGENCY MEDICINE

## 2025-08-18 PROCEDURE — 85025 COMPLETE CBC W/AUTO DIFF WBC: CPT | Performed by: EMERGENCY MEDICINE

## 2025-08-18 PROCEDURE — 99284 EMERGENCY DEPT VISIT MOD MDM: CPT

## 2025-08-18 RX ORDER — ONDANSETRON 2 MG/ML
4 INJECTION INTRAMUSCULAR; INTRAVENOUS ONCE
Status: COMPLETED | OUTPATIENT
Start: 2025-08-18 | End: 2025-08-18

## 2025-08-18 RX ORDER — SODIUM CHLORIDE 0.9 % (FLUSH) 0.9 %
10 SYRINGE (ML) INJECTION AS NEEDED
Status: DISCONTINUED | OUTPATIENT
Start: 2025-08-18 | End: 2025-08-18 | Stop reason: HOSPADM

## 2025-08-18 RX ORDER — ALUMINA, MAGNESIA, AND SIMETHICONE 2400; 2400; 240 MG/30ML; MG/30ML; MG/30ML
30 SUSPENSION ORAL ONCE
Status: COMPLETED | OUTPATIENT
Start: 2025-08-18 | End: 2025-08-18

## 2025-08-18 RX ORDER — ALUMINA, MAGNESIA, AND SIMETHICONE 2400; 2400; 240 MG/30ML; MG/30ML; MG/30ML
15 SUSPENSION ORAL ONCE
Status: COMPLETED | OUTPATIENT
Start: 2025-08-18 | End: 2025-08-18

## 2025-08-18 RX ORDER — LIDOCAINE HYDROCHLORIDE 20 MG/ML
5 SOLUTION OROPHARYNGEAL ONCE
Status: COMPLETED | OUTPATIENT
Start: 2025-08-18 | End: 2025-08-18

## 2025-08-18 RX ORDER — HYDROMORPHONE HYDROCHLORIDE 1 MG/ML
0.25 INJECTION, SOLUTION INTRAMUSCULAR; INTRAVENOUS; SUBCUTANEOUS ONCE
Status: COMPLETED | OUTPATIENT
Start: 2025-08-18 | End: 2025-08-18

## 2025-08-18 RX ADMIN — LIDOCAINE HYDROCHLORIDE 5 ML: 20 SOLUTION ORAL at 06:30

## 2025-08-18 RX ADMIN — HYDROMORPHONE HYDROCHLORIDE 0.25 MG: 1 INJECTION, SOLUTION INTRAMUSCULAR; INTRAVENOUS; SUBCUTANEOUS at 04:31

## 2025-08-18 RX ADMIN — ALUMINUM HYDROXIDE, MAGNESIUM HYDROXIDE, DIMETHICONE 30 ML: 400; 400; 40 SUSPENSION ORAL at 06:29

## 2025-08-18 RX ADMIN — ONDANSETRON 4 MG: 2 INJECTION INTRAMUSCULAR; INTRAVENOUS at 05:21

## 2025-08-18 RX ADMIN — ALUMINUM HYDROXIDE, MAGNESIUM HYDROXIDE, AND DIMETHICONE 15 ML: 400; 400; 40 SUSPENSION ORAL at 05:21

## (undated) DEVICE — SENSR O2 OXIMAX FNGR A/ 18IN NONSTR

## (undated) DEVICE — GOWN SURG AERO CHROME XL

## (undated) DEVICE — KT ORCA ORCAPOD DISP STRL

## (undated) DEVICE — Device

## (undated) DEVICE — PENCL ES MEGADINE EZ/CLEAN BUTN W/HOLSTR 10FT

## (undated) DEVICE — LN SMPL CO2 SHTRM SD STREAM W/M LUER

## (undated) DEVICE — TUBING, SUCTION, 1/4" X 10', STRAIGHT: Brand: MEDLINE

## (undated) DEVICE — PAD GRND E/S MEGADYNE MONOPLR 2/PLT W/CORD A/ DISP

## (undated) DEVICE — BITEBLOCK OMNI BLOC

## (undated) DEVICE — PANTY KNIT MATERN L/XL

## (undated) DEVICE — ADAPT CLN BIOGUARD AIR/H2O DISP

## (undated) DEVICE — PREP SOL POVIDONE/IODINE BT 4OZ

## (undated) DEVICE — THE SINGLE USE ETRAP – POLYP TRAP IS USED FOR SUCTION RETRIEVAL OF ENDOSCOPICALLY REMOVED POLYPS.: Brand: ETRAP

## (undated) DEVICE — SINGLE-USE BIOPSY FORCEPS: Brand: RADIAL JAW 4

## (undated) DEVICE — GLV SURG SENSICARE PI LF PF 7.5 GRN STRL

## (undated) DEVICE — SNAR POLYP CAPTIVATOR RND STFF 2.4 240CM 10MM 1P/U

## (undated) DEVICE — GOWN SURG SIRUS NONREINF W/SLV LG STRL

## (undated) DEVICE — PK PROC MINOR 40 CA/3

## (undated) DEVICE — DRP SURG UTIL W/TP 15X26IN STRL

## (undated) DEVICE — SUT VIC 2/0 SH 27IN UD VCP417H

## (undated) DEVICE — CANN O2 ETCO2 FITS ALL CONN CO2 SMPL A/ 7IN DISP LF

## (undated) DEVICE — NDL HYPO ECLPS SFTY 22G 1 1/2IN

## (undated) DEVICE — SPNG LAP 18X18IN LF STRL PK/5

## (undated) DEVICE — SPNG GZ WOVN 4X4IN 12PLY 10/BX STRL

## (undated) DEVICE — MSK PROC CURAPLEX O2 2/ADAPT 7FT

## (undated) DEVICE — GLV SURG PROTEXIS LTX W/NTRL PF 7.5